# Patient Record
Sex: FEMALE | Race: WHITE | NOT HISPANIC OR LATINO | Employment: OTHER | URBAN - METROPOLITAN AREA
[De-identification: names, ages, dates, MRNs, and addresses within clinical notes are randomized per-mention and may not be internally consistent; named-entity substitution may affect disease eponyms.]

---

## 2020-01-01 ENCOUNTER — HOSPITAL ENCOUNTER (INPATIENT)
Facility: HOSPITAL | Age: 85
LOS: 7 days | Discharge: NURSING, REHAB OR LONG-TERM CARE | DRG: 956 | End: 2020-01-08
Attending: EMERGENCY MEDICINE | Admitting: HOSPITALIST
Payer: MEDICARE

## 2020-01-01 ENCOUNTER — ANESTHESIA EVENT (OUTPATIENT)
Dept: SURGERY | Facility: HOSPITAL | Age: 85
DRG: 956 | End: 2020-01-01
Payer: MEDICARE

## 2020-01-01 DIAGNOSIS — S42.212A CLOSED DISPLACED FRACTURE OF SURGICAL NECK OF LEFT HUMERUS, UNSPECIFIED FRACTURE MORPHOLOGY, INITIAL ENCOUNTER: ICD-10-CM

## 2020-01-01 DIAGNOSIS — T14.8XXA CONTUSION: ICD-10-CM

## 2020-01-01 DIAGNOSIS — T79.6XXA TRAUMATIC RHABDOMYOLYSIS, INITIAL ENCOUNTER: ICD-10-CM

## 2020-01-01 DIAGNOSIS — S72.142A CLOSED DISPLACED INTERTROCHANTERIC FRACTURE OF LEFT FEMUR, INITIAL ENCOUNTER: Primary | ICD-10-CM
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
## 2020-01-01 DIAGNOSIS — R07.9 CHEST PAIN: ICD-10-CM

## 2020-01-01 PROBLEM — S42.202A FRACTURE OF PROXIMAL END OF LEFT HUMERUS: Status: ACTIVE | Noted: 2020-01-01

## 2020-01-01 PROBLEM — D72.829 LEUKOCYTOSIS: Status: ACTIVE | Noted: 2020-01-01

## 2020-01-01 LAB
ABO + RH BLD: NORMAL
ALBUMIN SERPL BCP-MCNC: 4.1 G/DL (ref 3.5–5.2)
ALP SERPL-CCNC: 65 U/L (ref 55–135)
ALT SERPL W/O P-5'-P-CCNC: 26 U/L (ref 10–44)
ANION GAP SERPL CALC-SCNC: 11 MMOL/L (ref 8–16)
AST SERPL-CCNC: 48 U/L (ref 10–40)
BACTERIA #/AREA URNS AUTO: NORMAL /HPF
BASOPHILS # BLD AUTO: 0.03 K/UL (ref 0–0.2)
BASOPHILS NFR BLD: 0.1 % (ref 0–1.9)
BILIRUB SERPL-MCNC: 1.6 MG/DL (ref 0.1–1)
BILIRUB UR QL STRIP: NEGATIVE
BLD GP AB SCN CELLS X3 SERPL QL: NORMAL
BUN SERPL-MCNC: 26 MG/DL (ref 10–30)
CALCIUM SERPL-MCNC: 9.9 MG/DL (ref 8.7–10.5)
CHLORIDE SERPL-SCNC: 105 MMOL/L (ref 95–110)
CK SERPL-CCNC: 1721 U/L (ref 20–180)
CLARITY UR REFRACT.AUTO: CLEAR
CO2 SERPL-SCNC: 24 MMOL/L (ref 23–29)
COLOR UR AUTO: YELLOW
CREAT SERPL-MCNC: 0.9 MG/DL (ref 0.5–1.4)
DIFFERENTIAL METHOD: ABNORMAL
EOSINOPHIL # BLD AUTO: 0 K/UL (ref 0–0.5)
EOSINOPHIL NFR BLD: 0 % (ref 0–8)
ERYTHROCYTE [DISTWIDTH] IN BLOOD BY AUTOMATED COUNT: 15.1 % (ref 11.5–14.5)
EST. GFR  (AFRICAN AMERICAN): >60 ML/MIN/1.73 M^2
EST. GFR  (NON AFRICAN AMERICAN): 54.5 ML/MIN/1.73 M^2
GLUCOSE SERPL-MCNC: 138 MG/DL (ref 70–110)
GLUCOSE UR QL STRIP: NEGATIVE
HCT VFR BLD AUTO: 46 % (ref 37–48.5)
HGB BLD-MCNC: 14.5 G/DL (ref 12–16)
HGB UR QL STRIP: NEGATIVE
IMM GRANULOCYTES # BLD AUTO: 0.18 K/UL (ref 0–0.04)
IMM GRANULOCYTES NFR BLD AUTO: 0.7 % (ref 0–0.5)
INR PPP: 1.1 (ref 0.8–1.2)
KETONES UR QL STRIP: ABNORMAL
LACTATE SERPL-SCNC: 2 MMOL/L (ref 0.5–2.2)
LEUKOCYTE ESTERASE UR QL STRIP: NEGATIVE
LYMPHOCYTES # BLD AUTO: 0.9 K/UL (ref 1–4.8)
LYMPHOCYTES NFR BLD: 3.8 % (ref 18–48)
MCH RBC QN AUTO: 27.6 PG (ref 27–31)
MCHC RBC AUTO-ENTMCNC: 31.5 G/DL (ref 32–36)
MCV RBC AUTO: 88 FL (ref 82–98)
MICROSCOPIC COMMENT: NORMAL
MONOCYTES # BLD AUTO: 1.8 K/UL (ref 0.3–1)
MONOCYTES NFR BLD: 7.5 % (ref 4–15)
NEUTROPHILS # BLD AUTO: 21.6 K/UL (ref 1.8–7.7)
NEUTROPHILS NFR BLD: 87.9 % (ref 38–73)
NITRITE UR QL STRIP: NEGATIVE
NRBC BLD-RTO: 0 /100 WBC
PH UR STRIP: 5 [PH] (ref 5–8)
PLATELET # BLD AUTO: 258 K/UL (ref 150–350)
PMV BLD AUTO: 9.6 FL (ref 9.2–12.9)
POTASSIUM SERPL-SCNC: 4.5 MMOL/L (ref 3.5–5.1)
PROT SERPL-MCNC: 7.4 G/DL (ref 6–8.4)
PROT UR QL STRIP: NEGATIVE
PROTHROMBIN TIME: 10.9 SEC (ref 9–12.5)
RBC # BLD AUTO: 5.26 M/UL (ref 4–5.4)
RBC #/AREA URNS AUTO: 1 /HPF (ref 0–4)
SODIUM SERPL-SCNC: 140 MMOL/L (ref 136–145)
SP GR UR STRIP: 1.02 (ref 1–1.03)
TROPONIN I SERPL DL<=0.01 NG/ML-MCNC: 0.02 NG/ML (ref 0–0.03)
URN SPEC COLLECT METH UR: ABNORMAL
WBC # BLD AUTO: 24.52 K/UL (ref 3.9–12.7)
WBC #/AREA URNS AUTO: 1 /HPF (ref 0–5)

## 2020-01-01 PROCEDURE — 93010 EKG 12-LEAD: ICD-10-PCS | Mod: ,,, | Performed by: INTERNAL MEDICINE

## 2020-01-01 PROCEDURE — 84484 ASSAY OF TROPONIN QUANT: CPT

## 2020-01-01 PROCEDURE — 99285 PR EMERGENCY DEPT VISIT,LEVEL V: ICD-10-PCS | Mod: ,,, | Performed by: EMERGENCY MEDICINE

## 2020-01-01 PROCEDURE — 86901 BLOOD TYPING SEROLOGIC RH(D): CPT

## 2020-01-01 PROCEDURE — 25000003 PHARM REV CODE 250: Performed by: HOSPITALIST

## 2020-01-01 PROCEDURE — 96361 HYDRATE IV INFUSION ADD-ON: CPT

## 2020-01-01 PROCEDURE — 11000001 HC ACUTE MED/SURG PRIVATE ROOM

## 2020-01-01 PROCEDURE — 82962 GLUCOSE BLOOD TEST: CPT

## 2020-01-01 PROCEDURE — 63600175 PHARM REV CODE 636 W HCPCS: Performed by: HOSPITALIST

## 2020-01-01 PROCEDURE — 96360 HYDRATION IV INFUSION INIT: CPT

## 2020-01-01 PROCEDURE — 63600175 PHARM REV CODE 636 W HCPCS: Performed by: EMERGENCY MEDICINE

## 2020-01-01 PROCEDURE — 83605 ASSAY OF LACTIC ACID: CPT

## 2020-01-01 PROCEDURE — 99285 EMERGENCY DEPT VISIT HI MDM: CPT | Mod: ,,, | Performed by: EMERGENCY MEDICINE

## 2020-01-01 PROCEDURE — 80053 COMPREHEN METABOLIC PANEL: CPT

## 2020-01-01 PROCEDURE — 93010 ELECTROCARDIOGRAM REPORT: CPT | Mod: ,,, | Performed by: INTERNAL MEDICINE

## 2020-01-01 PROCEDURE — 81001 URINALYSIS AUTO W/SCOPE: CPT

## 2020-01-01 PROCEDURE — 99223 PR INITIAL HOSPITAL CARE,LEVL III: ICD-10-PCS | Mod: ,,, | Performed by: HOSPITALIST

## 2020-01-01 PROCEDURE — 82550 ASSAY OF CK (CPK): CPT

## 2020-01-01 PROCEDURE — 85610 PROTHROMBIN TIME: CPT

## 2020-01-01 PROCEDURE — 99223 1ST HOSP IP/OBS HIGH 75: CPT | Mod: ,,, | Performed by: HOSPITALIST

## 2020-01-01 PROCEDURE — 99285 EMERGENCY DEPT VISIT HI MDM: CPT | Mod: 25

## 2020-01-01 PROCEDURE — 85025 COMPLETE CBC W/AUTO DIFF WBC: CPT

## 2020-01-01 PROCEDURE — 93005 ELECTROCARDIOGRAM TRACING: CPT

## 2020-01-01 RX ORDER — ACETAMINOPHEN 500 MG
1000 TABLET ORAL EVERY 8 HOURS
Status: DISCONTINUED | OUTPATIENT
Start: 2020-01-01 | End: 2020-01-02

## 2020-01-01 RX ORDER — TALC
6 POWDER (GRAM) TOPICAL NIGHTLY PRN
Status: DISCONTINUED | OUTPATIENT
Start: 2020-01-01 | End: 2020-01-08 | Stop reason: HOSPADM

## 2020-01-01 RX ORDER — OXYCODONE HYDROCHLORIDE 5 MG/1
10 TABLET ORAL
Status: DISCONTINUED | OUTPATIENT
Start: 2020-01-01 | End: 2020-01-02

## 2020-01-01 RX ORDER — SODIUM CHLORIDE 0.9 % (FLUSH) 0.9 %
10 SYRINGE (ML) INJECTION
Status: DISCONTINUED | OUTPATIENT
Start: 2020-01-01 | End: 2020-01-08 | Stop reason: HOSPADM

## 2020-01-01 RX ORDER — MORPHINE SULFATE 2 MG/ML
2 INJECTION, SOLUTION INTRAMUSCULAR; INTRAVENOUS
Status: DISCONTINUED | OUTPATIENT
Start: 2020-01-01 | End: 2020-01-02

## 2020-01-01 RX ORDER — POLYETHYLENE GLYCOL 3350 17 G/17G
17 POWDER, FOR SOLUTION ORAL DAILY
Status: DISCONTINUED | OUTPATIENT
Start: 2020-01-02 | End: 2020-01-08 | Stop reason: HOSPADM

## 2020-01-01 RX ORDER — SODIUM CHLORIDE 9 MG/ML
INJECTION, SOLUTION INTRAVENOUS CONTINUOUS
Status: ACTIVE | OUTPATIENT
Start: 2020-01-01 | End: 2020-01-02

## 2020-01-01 RX ORDER — BISACODYL 10 MG
10 SUPPOSITORY, RECTAL RECTAL DAILY PRN
Status: DISCONTINUED | OUTPATIENT
Start: 2020-01-01 | End: 2020-01-08 | Stop reason: HOSPADM

## 2020-01-01 RX ORDER — ONDANSETRON 2 MG/ML
4 INJECTION INTRAMUSCULAR; INTRAVENOUS EVERY 12 HOURS PRN
Status: DISCONTINUED | OUTPATIENT
Start: 2020-01-01 | End: 2020-01-02

## 2020-01-01 RX ORDER — OXYCODONE HYDROCHLORIDE 5 MG/1
5 TABLET ORAL
Status: DISCONTINUED | OUTPATIENT
Start: 2020-01-01 | End: 2020-01-02

## 2020-01-01 RX ORDER — SODIUM CHLORIDE 9 MG/ML
1000 INJECTION, SOLUTION INTRAVENOUS
Status: COMPLETED | OUTPATIENT
Start: 2020-01-01 | End: 2020-01-01

## 2020-01-01 RX ORDER — METHOCARBAMOL 500 MG/1
1000 TABLET, FILM COATED ORAL EVERY 6 HOURS PRN
Status: DISCONTINUED | OUTPATIENT
Start: 2020-01-01 | End: 2020-01-02

## 2020-01-01 RX ADMIN — PREGABALIN 75 MG: 75 CAPSULE ORAL at 09:01

## 2020-01-01 RX ADMIN — MORPHINE SULFATE 2 MG: 2 INJECTION, SOLUTION INTRAMUSCULAR; INTRAVENOUS at 09:01

## 2020-01-01 RX ADMIN — SODIUM CHLORIDE 500 ML: 0.9 INJECTION, SOLUTION INTRAVENOUS at 04:01

## 2020-01-01 RX ADMIN — SODIUM CHLORIDE: 0.9 INJECTION, SOLUTION INTRAVENOUS at 09:01

## 2020-01-01 RX ADMIN — SODIUM CHLORIDE 1000 ML: 0.9 INJECTION, SOLUTION INTRAVENOUS at 06:01

## 2020-01-01 RX ADMIN — ACETAMINOPHEN 1000 MG: 500 TABLET ORAL at 09:01

## 2020-01-01 RX ADMIN — SODIUM CHLORIDE 500 ML: 0.9 INJECTION, SOLUTION INTRAVENOUS at 05:01

## 2020-01-01 NOTE — ED NOTES
Patient identifiers for Franny Arnold 89 y.o. female checked and correct.  Chief Complaint   Patient presents with    Fall     pt was last seen yesterday, lives at home alone, unwitnessed fall, left hip pain and deformity.      History reviewed. No pertinent past medical history.  Allergies reported: Review of patient's allergies indicates:  No Known Allergies      LOC: Patient is awake, alert, and aware of environment with an appropriate affect. Patient is oriented x 2,confused to date, and speaking appropriately.  APPEARANCE: Patient resting comfortably and in no acute distress. Patient is clean and well groomed, patient's clothing is properly fastened.  SKIN: The skin is warm and dry. Patient has normal skin turgor and dry mucus membranes. Bruising noted to left upper arm  MUSKULOSKELETAL: Patient has limited ROM to left arm, left upper arm swelling, left lower extremity with shortening and rotation noted, Pulses intact.   RESPIRATORY: Airway is open and patent. Respirations are spontaneous and non-labored with normal effort and rate, BBS=clear  CARDIAC: Patient has a normal rate and rhythm. SR on cardiac monitor,No peripheral edema noted.   ABDOMEN: No distention noted. Soft and non-tender upon palpation.  NEUROLOGICAL: Facial expression is symmetrical. Hand grasps are equal bilaterally. Normal sensation in all extremities when touched with finger.

## 2020-01-01 NOTE — ED PROVIDER NOTES
Encounter Date: 1/1/2020       History     Chief Complaint   Patient presents with    Fall     pt was last seen yesterday, lives at home alone, unwitnessed fall, left hip pain and deformity.      HPI     The patient is a 89-year-old female with a history of early dementia who currently lives at home alone presenting with an unwitnessed fall with left hip pain and left arm pain.  Patient arrives via EMS, with left hip pain and concern for deformity.  This was an unwitnessed fall however the patient's family member arrived stating that she was found on the floor with unknown period of down time.  Patient denies any loss of consciousness but does endorse left hip and left shoulder/arm pain. She denies any fevers, chills, nausea, vomiting, diarrhea, chest pain, back pain, neck pain, loss of consciousness, headaches or visual changes.  Patient does not have any significant medical history, and is not on current anticoagulation.  Current pain scale 3/10, increased to 7/10 with movement of her left hip and left shoulder.  No other aggravating or alleviating factors.  Patient is right-handed, and was previously ambulatory prior to fall.    Review of patient's allergies indicates:  No Known Allergies  Past Medical History:   Diagnosis Date    Dementia      Past Surgical History:   Procedure Laterality Date    APPENDECTOMY      INSERTION OF INTRAMEDULLARY ZAYNAB Left 1/2/2020    Procedure: INSERTION, INTRAMEDULLARY ZAYNAB;  Surgeon: Adithya Addison MD;  [facility] [facility] [address] [place] [address];  Service: Orthopedics;  Laterality: Left;    TONSILLECTOMY       History reviewed. No pertinent family history.  Social History     Tobacco Use    Smoking status: Never Smoker    Smokeless tobacco: Never Used   Substance Use Topics    Alcohol use: Not Currently    Drug use: Never     Review of Systems   Constitutional: Negative for chills, fatigue and fever.   HENT: Negative for congestion and sore throat.    Eyes: Negative for  photophobia and visual disturbance.   Respiratory: Negative for choking, chest tightness and shortness of breath.    Cardiovascular: Negative for chest pain and palpitations.   Gastrointestinal: Negative for abdominal distention, abdominal pain, nausea and vomiting.   Endocrine: Negative for polyphagia and polyuria.   Genitourinary: Negative for difficulty urinating, dysuria and pelvic pain.   Musculoskeletal: Positive for arthralgias, gait problem, joint swelling and myalgias. Negative for neck pain and neck stiffness.   Neurological: Negative for seizures, speech difficulty, weakness, numbness and headaches.   Psychiatric/Behavioral: Positive for confusion.       Physical Exam     Initial Vitals   BP Pulse Resp Temp SpO2   01/01/20 1430 01/01/20 1430 01/01/20 1526 01/01/20 1430 01/01/20 1430   134/82 102 20 98.3 °F (36.8 °C) 96 %      MAP       --                Physical Exam    Nursing note and vitals reviewed.    Gen/Constitutional: Interactive. No acute distress  Head: Normocephalic, Atraumatic  Neck: supple, no masses or LAD, no JVD  Eyes: PERRLA, conjunctiva clear  Ears, Nose and Throat: No rhinorrhea or stridor.  Cardiac: Reg Rhythm, No murmur  Pulmonary: CTA Bilat, no wheezes, rhonchi, rales.  GI: Abdomen soft, non-tender, non-distended; no rebound or guarding  : No CVA tenderness.  Musculoskeletal: Extremities warm, well perfused, no erythema, no edema  Spine:  No midline spinal tenderness along the lumbar, thoracic or cervical spine, no bony step-offs.  Left hip:  Shortened, externally rotated, 2+ distal pulses, sensory intact to light touch, pain with elevation, extension or flexion, neurovascular intact.  Left shoulder/upper arm:  Ecchymosis and bruising overlying the left upper arm in the region of the humerus, tender to palpation, tender at humeral head, 2+ distal pulses, sensory intact to light touch, pain with range of motion, extension and elevation.  Skin: No rashes, ecchymosis and bruising as  above.  Neuro: Alert and Oriented x 2 with evidence of dementia and confusion.; No focal motor or sensory deficits.  Gait not assessed, no dysmetria, no ataxia, cranial nerves 2-12 intact.  Psych: Normal affect, pleasantly confused      ED Course   Procedures  Labs Reviewed   CBC W/ AUTO DIFFERENTIAL - Abnormal; Notable for the following components:       Result Value    WBC 24.52 (*)     Mean Corpuscular Hemoglobin Conc 31.5 (*)     RDW 15.1 (*)     Immature Granulocytes 0.7 (*)     Gran # (ANC) 21.6 (*)     Immature Grans (Abs) 0.18 (*)     Lymph # 0.9 (*)     Mono # 1.8 (*)     Gran% 87.9 (*)     Lymph% 3.8 (*)     All other components within normal limits   COMPREHENSIVE METABOLIC PANEL - Abnormal; Notable for the following components:    Glucose 138 (*)     Total Bilirubin 1.6 (*)     AST 48 (*)     eGFR if non  54.5 (*)     All other components within normal limits   URINALYSIS, REFLEX TO URINE CULTURE - Abnormal; Notable for the following components:    Ketones, UA 1+ (*)     All other components within normal limits    Narrative:     Preferred Collection Type->Urine, Clean Catch   CK - Abnormal; Notable for the following components:    CPK 1721 (*)     All other components within normal limits   TROPONIN I   PROTIME-INR   LACTIC ACID, PLASMA   URINALYSIS MICROSCOPIC    Narrative:     Preferred Collection Type->Urine, Clean Catch   POCT GLUCOSE MONITORING CONTINUOUS     EKG Readings: (Independently Interpreted)   Initial Reading: No STEMI. Previous EKG: Compared with most recent EKG Rhythm: Normal Sinus Rhythm. Heart Rate: 98. Ectopy: No Ectopy. ST Segments: Non-Specific ST Segment Depression.     ECG Results          EKG 12-lead (Final result)  Result time 01/02/20 11:31:04    Final result by Interface, [facility] (01/02/20 11:31:04)                 Narrative:    Test Reason : W19.XXXA,    Vent. Rate : 098 BPM     Atrial Rate : 098 BPM     P-R Int : 144 ms          QRS Dur : 076 ms       QT Int : 380 ms       P-R-T Axes : 085 070 073 degrees     QTc Int : 485 ms    Normal sinus rhythm  Possible Left atrial enlargement  Otherwise normal ECG  No previous ECGs available  Confirmed by fellow Tamara CARTER (Fellow's Unofficial Report), Carlitos ([id number]) on  1/2/2020 9:56:20 AM  Confirmed by Maciej Powell MD ([id number]) on 1/2/2020 11:30:53 AM    Referred By: [email or web address]   SELF           Confirmed By:Maciej Powell MD                            Imaging Results          CT Head Without Contrast (Final result)  Result time 01/01/20 18:05:47    Final result by Eduardo Dailey MD (01/01/20 18:05:47)                 Impression:      1. No acute intracranial abnormality identified.  2. Generalized cerebral volume loss and sequela of chronic microvascular ischemic change.    Electronically signed by resident: Maciej Vora  Date:    01/01/2020  Time:    17:56    Electronically signed by: Eduardo Dailey MD  Date:    01/01/2020  Time:    18:05             Narrative:    EXAMINATION:  CT HEAD WITHOUT CONTRAST    CLINICAL HISTORY:  Confusion/delirium, altered LOC, unexplained;    TECHNIQUE:  Low dose axial CT images obtained throughout the head without intravenous contrast. Sagittal and coronal reconstructions were performed.    COMPARISON:  None.    FINDINGS:  Intracranial compartment:    There is generalized cerebral volume loss.  There is hypoattenuation in a periventricular fashion, likely sequela of chronic microvascular ischemic change.No hydrocephalus.  No extra-axial blood or fluid collections.    No parenchymal mass, hemorrhage, edema or major vascular distribution infarct.  No midline shift.    Skull/extracranial contents (limited evaluation): No fracture. Mastoid air cells and paranasal sinuses are essentially clear.                               X-Ray Humerus 2 View Left (Final result)  Result time 01/01/20 16:48:00    Final result by Eduardo Dailey MD (01/01/20 16:48:00)                 Impression:      1.  Acute proximal left humeral head/neck fracture as above.      Electronically signed by: Eduardo Dailey MD  Date:    01/01/2020  Time:    16:48             Narrative:    EXAMINATION:  XR HUMERUS 2 VIEW LEFT    CLINICAL HISTORY:  Other injury of unspecified body region, initial encounter    COMPARISON:  None    FINDINGS:  Two views left humerus.    There is an acute fracture of the surgical neck of the proximal humerus, fracture planes extend to involve the humeral head, better demonstrated on current exam than on concurrent radiograph of the shoulder.  The remaining aspects of the humerus appear intact.  The elbow appears grossly intact.                               X-Ray Shoulder Trauma Left (Final result)  Result time 01/01/20 16:47:00    Final result by Eduardo Dailey MD (01/01/20 16:47:00)                 Impression:      1. Findings concerning for impacted fracture of the left humeral surgical neck as above.      Electronically signed by: Eduardo Dailey MD  Date:    01/01/2020  Time:    16:47             Narrative:    EXAMINATION:  XR SHOULDER TRAUMA 3 VIEW LEFT    CLINICAL HISTORY:  Unspecified fall, initial encounter    TECHNIQUE:  Three views of the left shoulder were performed.    COMPARISON  None    FINDINGS:  Three views.    There is cortical irregularity involving the surgical neck of the humerus.  There may be impaction at the site.  No glenohumeral dislocation.  The left acromioclavicular joint is intact.  No acute displaced left rib fracture.                               X-Ray Pelvis Routine AP (Final result)  Result time 01/01/20 16:47:20   Procedure changed from X-Ray Hip 2 View Left     Final result by Jama Piper MD (01/01/20 16:47:20)                 Impression:      Acute intertrochanteric fracture of the proximal left femur.      Electronically signed by: Jama Piper MD  Date:    01/01/2020  Time:    16:47             Narrative:    EXAMINATION:  XR PELVIS ROUTINE  AP    CLINICAL HISTORY:  fall ;  Unspecified fall, initial encounter    TECHNIQUE:  AP view of the pelvis was performed.    COMPARISON:  None.    FINDINGS:  There is an acute left intertrochanteric fracture demonstrated.  There is resulting varus angulation.  The left femoral head remains appropriately seated within the acetabulum.  The remaining osseous structures appear intact.  Sacrum is obscured by overlying bowel gas.                               X-Ray Femur AP/LAT Left (Final result)  Result time 01/01/20 16:45:54    Final result by Eduardo Dailey MD (01/01/20 16:45:54)                 Impression:      1. Acute intertrochanteric fracture of the proximal left femur as above.      Electronically signed by: Eduardo Dailey MD  Date:    01/01/2020  Time:    16:45             Narrative:    EXAMINATION:  XR FEMUR 2 VIEW LEFT    TECHNIQUE:  AP and lateral views of the left femur were performed.    COMPARISON:  None}    FINDINGS:  Four views left femur.    There is an acute intertrochanteric fracture of the proximal left femur.  The femoroacetabular joint is intact.  There is osteopenia.  No additional fracture of the femur.  The knee appears intact.  No radiopaque foreign body.                               X-Ray Chest AP Portable (Final result)  Result time 01/01/20 16:45:37    Final result by Venancio Obrien MD (01/01/20 16:45:37)                 Impression:      No acute abnormality.      Electronically signed by: Venancio Obrien MD  Date:    01/01/2020  Time:    16:45             Narrative:    EXAMINATION:  XR CHEST AP PORTABLE    CLINICAL HISTORY:  fall;    TECHNIQUE:  Single frontal view of the chest was performed.    COMPARISON:  None    FINDINGS:  Lungs are clear.  No effusion or pneumothorax.    No acute bone abnormality.                              X-Rays:   Independently Interpreted Readings:   Chest X-Ray: Normal heart size.  No infiltrates.  No acute abnormalities.   Head CT: No hemorrhage.  No  skull fracture.  No acute stroke.   Other Readings:  X-ray humerus:  Acute proximal left humeral head/neck fracture    X-ray femur/hip:  Acute intratrochanteric fracture of the left femur    Medical Decision Making:   History:   I obtained history from: EMS provider.  Old Medical Records: I decided to obtain old medical records.  Old Records Summarized: records from clinic visits.  Initial Assessment:   89-year-old previously healthy female with a history of early dementia presenting with unwitnessed fall, left hip deformity, and left upper arm ecchymosis.  Differential Diagnosis:   Differential diagnosis includes but is not limited to:  Fracture, dislocation, contusion, sprain, ACS, PE, dehydration, electrolyte abnormality, UTI, pneumonia, uremia, rhabdomyolysis  Independently Interpreted Test(s):   I have ordered and independently interpreted X-rays - see prior notes.  I have ordered and independently interpreted EKG Reading(s) - see prior notes  Clinical Tests:   Lab Tests: Ordered and Reviewed  Radiological Study: Reviewed and Ordered  Medical Tests: Ordered and Reviewed  Other:   I have discussed this case with another health care provider.       <> Summary of the Discussion: Orthopedic surgery and hospital medicine    Emergent evaluation of patient presenting with unwitnessed mechanical fall while getting out of bed.  She is afebrile, vital signs are stable. Physical exam findings remarkable for left hip deformity with external rotation and shortening including ecchymosis along the left upper arm with pain with range of motion and direct palpation. Suspect likely hip fracture an arm fracture.  Imaging of the chest, left upper arm and shoulder, left hip and pelvis were obtained. Positive for fracture of the left femur and positive for fracture of the left humerus.  Discussed case with Orthopedic surgery, who will evaluate for operative intervention.  Regarding the left arm, plan is for sling and swath.   Remainder of workup included ECG, chest x-ray, labs, IV fluids.  Given unwitnessed fall broad workup conducted.  Doubt syncope as patient states this was a mechanical fall as she tripped and lost balance.  Although she was found down for possibly 1-2 hours, unlikely ACS, PE based on exam, history and findings.  Patient is alert and oriented however she has early dementia and appears to have some confusion at times.  UA obtained, with no evidence of UTI.  No infectious etiology at this time however patient does have significant leukocytosis to 24,000.  Suspect this is likely stress response from fractures and fall.  CPK elevated, likely rhabdomyolysis and will continue to monitor.  Patient was given fluid L bolus, and then placed on IV fluids at 100 mL/hour given new rhabdomyolysis.  No evidence of renal failure at this time.  ECG with no signs of ischemia or STEMI on my read.  Chest x-ray with no acute findings to suggest pneumonia, consolidation or pneumothorax.  Given orthopedic injuries, discussed case with hospital medicine team, will admit for hip fracture management, and left humerus fracture.  I had a long discussion with the family, they are agreeable to admission plan, patient will likely need outpatient rehab/skilled nursing care after discharge and operative fixation of her left hip.  CT head was obtained with no evidence of intracranial process, intracranial hemorrhage or acute findings.  Spine exam unremarkable with no tenderness along the midline spine or bony step-offs.    Complexity:  High-level 5                               Clinical Impression:       ICD-10-CM ICD-9-CM   1. Closed displaced intertrochanteric fracture of left femur, initial encounter S72.142A 820.21   2. Fall W19.XXXA E888.9   3. Contusion T14.8XXA 924.9   4. Closed displaced fracture of surgical neck of left humerus, unspecified fracture morphology, initial encounter S42.212A 812.01   5. Traumatic rhabdomyolysis, initial encounter  T79.6XXA 958.6         Disposition:   Disposition: Admitted  Condition: Stable      Alejandro Maloney DO  Dept of Emergency Medicine   [facility]  Spectralink: [id number]                 Alejandro Maloney DO  01/03/20 1130

## 2020-01-01 NOTE — ED TRIAGE NOTES
Fall today found on floor by family, pt denies LOC, obvious shortening and external rotation to left leg.    no

## 2020-01-02 ENCOUNTER — ANESTHESIA (OUTPATIENT)
Dept: SURGERY | Facility: HOSPITAL | Age: 85
DRG: 956 | End: 2020-01-02
Payer: MEDICARE

## 2020-01-02 LAB
ANION GAP SERPL CALC-SCNC: 8 MMOL/L (ref 8–16)
BASOPHILS # BLD AUTO: 0.02 K/UL (ref 0–0.2)
BASOPHILS # BLD AUTO: 0.05 K/UL (ref 0–0.2)
BASOPHILS NFR BLD: 0.1 % (ref 0–1.9)
BASOPHILS NFR BLD: 0.2 % (ref 0–1.9)
BUN SERPL-MCNC: 19 MG/DL (ref 10–30)
CALCIUM SERPL-MCNC: 8.5 MG/DL (ref 8.7–10.5)
CHLORIDE SERPL-SCNC: 112 MMOL/L (ref 95–110)
CK SERPL-CCNC: 853 U/L (ref 20–180)
CO2 SERPL-SCNC: 22 MMOL/L (ref 23–29)
CREAT SERPL-MCNC: 0.8 MG/DL (ref 0.5–1.4)
DIFFERENTIAL METHOD: ABNORMAL
DIFFERENTIAL METHOD: ABNORMAL
EOSINOPHIL # BLD AUTO: 0 K/UL (ref 0–0.5)
EOSINOPHIL # BLD AUTO: 0 K/UL (ref 0–0.5)
EOSINOPHIL NFR BLD: 0 % (ref 0–8)
EOSINOPHIL NFR BLD: 0.1 % (ref 0–8)
ERYTHROCYTE [DISTWIDTH] IN BLOOD BY AUTOMATED COUNT: 15.7 % (ref 11.5–14.5)
ERYTHROCYTE [DISTWIDTH] IN BLOOD BY AUTOMATED COUNT: 15.9 % (ref 11.5–14.5)
EST. GFR  (AFRICAN AMERICAN): >60 ML/MIN/1.73 M^2
EST. GFR  (NON AFRICAN AMERICAN): >60 ML/MIN/1.73 M^2
GLUCOSE SERPL-MCNC: 97 MG/DL (ref 70–110)
HCT VFR BLD AUTO: 37.7 % (ref 37–48.5)
HCT VFR BLD AUTO: 40.2 % (ref 37–48.5)
HGB BLD-MCNC: 12 G/DL (ref 12–16)
HGB BLD-MCNC: 12.1 G/DL (ref 12–16)
IMM GRANULOCYTES # BLD AUTO: 0.09 K/UL (ref 0–0.04)
IMM GRANULOCYTES # BLD AUTO: 0.25 K/UL (ref 0–0.04)
IMM GRANULOCYTES NFR BLD AUTO: 0.5 % (ref 0–0.5)
IMM GRANULOCYTES NFR BLD AUTO: 1.1 % (ref 0–0.5)
LYMPHOCYTES # BLD AUTO: 1.7 K/UL (ref 1–4.8)
LYMPHOCYTES # BLD AUTO: 2.3 K/UL (ref 1–4.8)
LYMPHOCYTES NFR BLD: 13.1 % (ref 18–48)
LYMPHOCYTES NFR BLD: 7.3 % (ref 18–48)
MAGNESIUM SERPL-MCNC: 2 MG/DL (ref 1.6–2.6)
MCH RBC QN AUTO: 27.7 PG (ref 27–31)
MCH RBC QN AUTO: 28.1 PG (ref 27–31)
MCHC RBC AUTO-ENTMCNC: 29.9 G/DL (ref 32–36)
MCHC RBC AUTO-ENTMCNC: 32.1 G/DL (ref 32–36)
MCV RBC AUTO: 88 FL (ref 82–98)
MCV RBC AUTO: 93 FL (ref 82–98)
MONOCYTES # BLD AUTO: 1.6 K/UL (ref 0.3–1)
MONOCYTES # BLD AUTO: 1.9 K/UL (ref 0.3–1)
MONOCYTES NFR BLD: 7.8 % (ref 4–15)
MONOCYTES NFR BLD: 9.2 % (ref 4–15)
NEUTROPHILS # BLD AUTO: 13.6 K/UL (ref 1.8–7.7)
NEUTROPHILS # BLD AUTO: 19.8 K/UL (ref 1.8–7.7)
NEUTROPHILS NFR BLD: 77 % (ref 38–73)
NEUTROPHILS NFR BLD: 83.6 % (ref 38–73)
NRBC BLD-RTO: 0 /100 WBC
NRBC BLD-RTO: 0 /100 WBC
PHOSPHATE SERPL-MCNC: 2.9 MG/DL (ref 2.7–4.5)
PLATELET # BLD AUTO: 217 K/UL (ref 150–350)
PLATELET # BLD AUTO: 218 K/UL (ref 150–350)
PMV BLD AUTO: 9.8 FL (ref 9.2–12.9)
PMV BLD AUTO: 9.8 FL (ref 9.2–12.9)
POCT GLUCOSE: 127 MG/DL (ref 70–110)
POTASSIUM SERPL-SCNC: 3.9 MMOL/L (ref 3.5–5.1)
RBC # BLD AUTO: 4.3 M/UL (ref 4–5.4)
RBC # BLD AUTO: 4.33 M/UL (ref 4–5.4)
SODIUM SERPL-SCNC: 142 MMOL/L (ref 136–145)
WBC # BLD AUTO: 17.59 K/UL (ref 3.9–12.7)
WBC # BLD AUTO: 23.75 K/UL (ref 3.9–12.7)

## 2020-01-02 PROCEDURE — 85025 COMPLETE CBC W/AUTO DIFF WBC: CPT

## 2020-01-02 PROCEDURE — 63600175 PHARM REV CODE 636 W HCPCS: Performed by: HOSPITALIST

## 2020-01-02 PROCEDURE — 63600175 PHARM REV CODE 636 W HCPCS: Performed by: ANESTHESIOLOGY

## 2020-01-02 PROCEDURE — 23600 PR CLOSED RX PROX HUMERUS FRACTURE: ICD-10-PCS | Mod: 51,LT,, | Performed by: ORTHOPAEDIC SURGERY

## 2020-01-02 PROCEDURE — 63600175 PHARM REV CODE 636 W HCPCS: Performed by: STUDENT IN AN ORGANIZED HEALTH CARE EDUCATION/TRAINING PROGRAM

## 2020-01-02 PROCEDURE — 36000711: Performed by: ORTHOPAEDIC SURGERY

## 2020-01-02 PROCEDURE — 25000003 PHARM REV CODE 250: Performed by: STUDENT IN AN ORGANIZED HEALTH CARE EDUCATION/TRAINING PROGRAM

## 2020-01-02 PROCEDURE — D9220A PRA ANESTHESIA: Mod: ANES,,, | Performed by: ANESTHESIOLOGY

## 2020-01-02 PROCEDURE — 37000008 HC ANESTHESIA 1ST 15 MINUTES: Performed by: ORTHOPAEDIC SURGERY

## 2020-01-02 PROCEDURE — D9220A PRA ANESTHESIA: ICD-10-PCS | Mod: ANES,,, | Performed by: ANESTHESIOLOGY

## 2020-01-02 PROCEDURE — D9220A PRA ANESTHESIA: Mod: CRNA,,, | Performed by: NURSE ANESTHETIST, CERTIFIED REGISTERED

## 2020-01-02 PROCEDURE — 76942 ECHO GUIDE FOR BIOPSY: CPT | Performed by: STUDENT IN AN ORGANIZED HEALTH CARE EDUCATION/TRAINING PROGRAM

## 2020-01-02 PROCEDURE — 99222 PR INITIAL HOSPITAL CARE,LEVL II: ICD-10-PCS | Mod: 57,,, | Performed by: ORTHOPAEDIC SURGERY

## 2020-01-02 PROCEDURE — 37000009 HC ANESTHESIA EA ADD 15 MINS: Performed by: ORTHOPAEDIC SURGERY

## 2020-01-02 PROCEDURE — 25000003 PHARM REV CODE 250: Performed by: NURSE ANESTHETIST, CERTIFIED REGISTERED

## 2020-01-02 PROCEDURE — 99900035 HC TECH TIME PER 15 MIN (STAT)

## 2020-01-02 PROCEDURE — 25000003 PHARM REV CODE 250: Performed by: HOSPITALIST

## 2020-01-02 PROCEDURE — 27245 PR OPEN FIX INTER/SUBTROCH FX,IMPLNT: ICD-10-PCS | Mod: LT,,, | Performed by: ORTHOPAEDIC SURGERY

## 2020-01-02 PROCEDURE — 80048 BASIC METABOLIC PNL TOTAL CA: CPT

## 2020-01-02 PROCEDURE — D9220A PRA ANESTHESIA: ICD-10-PCS | Mod: CRNA,,, | Performed by: NURSE ANESTHETIST, CERTIFIED REGISTERED

## 2020-01-02 PROCEDURE — 23600 CLTX PROX HUMRL FX W/O MNPJ: CPT | Mod: 51,LT,, | Performed by: ORTHOPAEDIC SURGERY

## 2020-01-02 PROCEDURE — 83735 ASSAY OF MAGNESIUM: CPT

## 2020-01-02 PROCEDURE — 82550 ASSAY OF CK (CPK): CPT

## 2020-01-02 PROCEDURE — 76942 ECHO GUIDE FOR BIOPSY: CPT | Mod: 26,,, | Performed by: ANESTHESIOLOGY

## 2020-01-02 PROCEDURE — 64999 LEFT SIFI CATHETER: ICD-10-PCS | Mod: ,,, | Performed by: ANESTHESIOLOGY

## 2020-01-02 PROCEDURE — 64999 UNLISTED PX NERVOUS SYSTEM: CPT | Mod: ,,, | Performed by: ANESTHESIOLOGY

## 2020-01-02 PROCEDURE — 64448 NJX AA&/STRD FEM NRV NFS IMG: CPT | Performed by: STUDENT IN AN ORGANIZED HEALTH CARE EDUCATION/TRAINING PROGRAM

## 2020-01-02 PROCEDURE — 76942 LEFT SIFI CATHETER: ICD-10-PCS | Mod: 26,,, | Performed by: ANESTHESIOLOGY

## 2020-01-02 PROCEDURE — 11000001 HC ACUTE MED/SURG PRIVATE ROOM

## 2020-01-02 PROCEDURE — 84100 ASSAY OF PHOSPHORUS: CPT

## 2020-01-02 PROCEDURE — 36000710: Performed by: ORTHOPAEDIC SURGERY

## 2020-01-02 PROCEDURE — 27000221 HC OXYGEN, UP TO 24 HOURS

## 2020-01-02 PROCEDURE — C1769 GUIDE WIRE: HCPCS | Performed by: ORTHOPAEDIC SURGERY

## 2020-01-02 PROCEDURE — 63600175 PHARM REV CODE 636 W HCPCS: Performed by: NURSE ANESTHETIST, CERTIFIED REGISTERED

## 2020-01-02 PROCEDURE — 71000033 HC RECOVERY, INTIAL HOUR: Performed by: ORTHOPAEDIC SURGERY

## 2020-01-02 PROCEDURE — 27201423 OPTIME MED/SURG SUP & DEVICES STERILE SUPPLY: Performed by: ORTHOPAEDIC SURGERY

## 2020-01-02 PROCEDURE — 36415 COLL VENOUS BLD VENIPUNCTURE: CPT

## 2020-01-02 PROCEDURE — C1713 ANCHOR/SCREW BN/BN,TIS/BN: HCPCS | Performed by: ORTHOPAEDIC SURGERY

## 2020-01-02 PROCEDURE — 94761 N-INVAS EAR/PLS OXIMETRY MLT: CPT

## 2020-01-02 PROCEDURE — 27245 TREAT THIGH FRACTURE: CPT | Mod: LT,,, | Performed by: ORTHOPAEDIC SURGERY

## 2020-01-02 PROCEDURE — 99222 1ST HOSP IP/OBS MODERATE 55: CPT | Mod: 57,,, | Performed by: ORTHOPAEDIC SURGERY

## 2020-01-02 DEVICE — NAIL IM CANN 130 DEG 11X400 L: Type: IMPLANTABLE DEVICE | Site: FEMUR | Status: FUNCTIONAL

## 2020-01-02 DEVICE — SCREW STRDRV REC T25 5X50 TTNM: Type: IMPLANTABLE DEVICE | Site: FEMUR | Status: FUNCTIONAL

## 2020-01-02 RX ORDER — ONDANSETRON 2 MG/ML
INJECTION INTRAMUSCULAR; INTRAVENOUS
Status: DISCONTINUED | OUTPATIENT
Start: 2020-01-02 | End: 2020-01-02

## 2020-01-02 RX ORDER — PHENYLEPHRINE HYDROCHLORIDE 10 MG/ML
INJECTION INTRAVENOUS
Status: DISCONTINUED | OUTPATIENT
Start: 2020-01-02 | End: 2020-01-02

## 2020-01-02 RX ORDER — ROPIVACAINE HYDROCHLORIDE 2 MG/ML
2 INJECTION, SOLUTION EPIDURAL; INFILTRATION; PERINEURAL CONTINUOUS
Status: DISCONTINUED | OUTPATIENT
Start: 2020-01-02 | End: 2020-01-06

## 2020-01-02 RX ORDER — SODIUM CHLORIDE 0.9 % (FLUSH) 0.9 %
10 SYRINGE (ML) INJECTION
Status: DISCONTINUED | OUTPATIENT
Start: 2020-01-02 | End: 2020-01-08 | Stop reason: HOSPADM

## 2020-01-02 RX ORDER — CEFAZOLIN SODIUM 1 G/3ML
2 INJECTION, POWDER, FOR SOLUTION INTRAMUSCULAR; INTRAVENOUS
Status: COMPLETED | OUTPATIENT
Start: 2020-01-02 | End: 2020-01-03

## 2020-01-02 RX ORDER — MUPIROCIN 20 MG/G
1 OINTMENT TOPICAL 2 TIMES DAILY
Status: COMPLETED | OUTPATIENT
Start: 2020-01-02 | End: 2020-01-06

## 2020-01-02 RX ORDER — LIDOCAINE HYDROCHLORIDE 10 MG/ML
1 INJECTION, SOLUTION EPIDURAL; INFILTRATION; INTRACAUDAL; PERINEURAL
Status: DISCONTINUED | OUTPATIENT
Start: 2020-01-02 | End: 2020-01-02

## 2020-01-02 RX ORDER — PROPOFOL 10 MG/ML
VIAL (ML) INTRAVENOUS
Status: DISCONTINUED | OUTPATIENT
Start: 2020-01-02 | End: 2020-01-02

## 2020-01-02 RX ORDER — LIDOCAINE HCL/PF 100 MG/5ML
SYRINGE (ML) INTRAVENOUS
Status: DISCONTINUED | OUTPATIENT
Start: 2020-01-02 | End: 2020-01-02

## 2020-01-02 RX ORDER — TRANEXAMIC ACID 100 MG/ML
1000 INJECTION, SOLUTION INTRAVENOUS
Status: DISCONTINUED | OUTPATIENT
Start: 2020-01-02 | End: 2020-01-02

## 2020-01-02 RX ORDER — ROPIVACAINE HYDROCHLORIDE 5 MG/ML
INJECTION, SOLUTION EPIDURAL; INFILTRATION; PERINEURAL
Status: COMPLETED | OUTPATIENT
Start: 2020-01-02 | End: 2020-01-02

## 2020-01-02 RX ORDER — SODIUM CHLORIDE 9 MG/ML
INJECTION, SOLUTION INTRAVENOUS CONTINUOUS PRN
Status: DISCONTINUED | OUTPATIENT
Start: 2020-01-02 | End: 2020-01-02

## 2020-01-02 RX ORDER — ROPIVACAINE HYDROCHLORIDE 5 MG/ML
INJECTION, SOLUTION EPIDURAL; INFILTRATION; PERINEURAL
Status: COMPLETED
Start: 2020-01-02 | End: 2020-01-02

## 2020-01-02 RX ORDER — METHOCARBAMOL 500 MG/1
500 TABLET, FILM COATED ORAL 4 TIMES DAILY
Status: DISCONTINUED | OUTPATIENT
Start: 2020-01-02 | End: 2020-01-07

## 2020-01-02 RX ORDER — FENTANYL CITRATE 50 UG/ML
INJECTION, SOLUTION INTRAMUSCULAR; INTRAVENOUS
Status: DISCONTINUED | OUTPATIENT
Start: 2020-01-02 | End: 2020-01-02

## 2020-01-02 RX ORDER — ONDANSETRON 2 MG/ML
4 INJECTION INTRAMUSCULAR; INTRAVENOUS EVERY 12 HOURS PRN
Status: DISCONTINUED | OUTPATIENT
Start: 2020-01-02 | End: 2020-01-08 | Stop reason: HOSPADM

## 2020-01-02 RX ORDER — ENOXAPARIN SODIUM 100 MG/ML
40 INJECTION SUBCUTANEOUS EVERY 24 HOURS
Status: DISCONTINUED | OUTPATIENT
Start: 2020-01-02 | End: 2020-01-08 | Stop reason: HOSPADM

## 2020-01-02 RX ORDER — MUPIROCIN 20 MG/G
1 OINTMENT TOPICAL
Status: DISCONTINUED | OUTPATIENT
Start: 2020-01-02 | End: 2020-01-02

## 2020-01-02 RX ORDER — FENTANYL CITRATE 50 UG/ML
25 INJECTION, SOLUTION INTRAMUSCULAR; INTRAVENOUS EVERY 5 MIN PRN
Status: DISCONTINUED | OUTPATIENT
Start: 2020-01-02 | End: 2020-01-02

## 2020-01-02 RX ORDER — ROCURONIUM BROMIDE 10 MG/ML
INJECTION, SOLUTION INTRAVENOUS
Status: DISCONTINUED | OUTPATIENT
Start: 2020-01-02 | End: 2020-01-02

## 2020-01-02 RX ORDER — ACETAMINOPHEN 325 MG/1
650 TABLET ORAL EVERY 6 HOURS
Status: DISPENSED | OUTPATIENT
Start: 2020-01-02 | End: 2020-01-04

## 2020-01-02 RX ORDER — CEFAZOLIN SODIUM 1 G/3ML
2 INJECTION, POWDER, FOR SOLUTION INTRAMUSCULAR; INTRAVENOUS
Status: COMPLETED | OUTPATIENT
Start: 2020-01-02 | End: 2020-01-02

## 2020-01-02 RX ORDER — MUPIROCIN 20 MG/G
OINTMENT TOPICAL
Status: DISCONTINUED | OUTPATIENT
Start: 2020-01-02 | End: 2020-01-02 | Stop reason: HOSPADM

## 2020-01-02 RX ORDER — CHOLECALCIFEROL (VITAMIN D3) 25 MCG
2000 TABLET ORAL DAILY
Status: DISCONTINUED | OUTPATIENT
Start: 2020-01-02 | End: 2020-01-08 | Stop reason: HOSPADM

## 2020-01-02 RX ORDER — MIDAZOLAM HYDROCHLORIDE 1 MG/ML
1 INJECTION INTRAMUSCULAR; INTRAVENOUS EVERY 5 MIN PRN
Status: DISCONTINUED | OUTPATIENT
Start: 2020-01-02 | End: 2020-01-02

## 2020-01-02 RX ADMIN — ACETAMINOPHEN 650 MG: 325 TABLET ORAL at 06:01

## 2020-01-02 RX ADMIN — METHOCARBAMOL TABLETS 500 MG: 500 TABLET, COATED ORAL at 02:01

## 2020-01-02 RX ADMIN — FENTANYL CITRATE 50 MCG: 50 INJECTION INTRAMUSCULAR; INTRAVENOUS at 09:01

## 2020-01-02 RX ADMIN — FENTANYL CITRATE 25 MCG: 50 INJECTION, SOLUTION INTRAMUSCULAR; INTRAVENOUS at 11:01

## 2020-01-02 RX ADMIN — MUPIROCIN 1 G: 20 OINTMENT TOPICAL at 09:01

## 2020-01-02 RX ADMIN — ROPIVACAINE HYDROCHLORIDE 2 ML/HR: 2 INJECTION, SOLUTION EPIDURAL; INFILTRATION at 01:01

## 2020-01-02 RX ADMIN — CEFAZOLIN 2 G: 330 INJECTION, POWDER, FOR SOLUTION INTRAMUSCULAR; INTRAVENOUS at 10:01

## 2020-01-02 RX ADMIN — SODIUM CHLORIDE: 0.9 INJECTION, SOLUTION INTRAVENOUS at 09:01

## 2020-01-02 RX ADMIN — ROCURONIUM BROMIDE 30 MG: 10 INJECTION, SOLUTION INTRAVENOUS at 10:01

## 2020-01-02 RX ADMIN — ROPIVACAINE HYDROCHLORIDE 20 ML: 5 INJECTION, SOLUTION EPIDURAL; INFILTRATION; PERINEURAL at 10:01

## 2020-01-02 RX ADMIN — LIDOCAINE HYDROCHLORIDE 60 MG: 20 INJECTION, SOLUTION INTRAVENOUS at 10:01

## 2020-01-02 RX ADMIN — POLYETHYLENE GLYCOL 3350 17 G: 17 POWDER, FOR SOLUTION ORAL at 03:01

## 2020-01-02 RX ADMIN — ONDANSETRON 4 MG: 2 INJECTION INTRAMUSCULAR; INTRAVENOUS at 12:01

## 2020-01-02 RX ADMIN — PROPOFOL 100 MG: 10 INJECTION, EMULSION INTRAVENOUS at 10:01

## 2020-01-02 RX ADMIN — PROPOFOL 30 MG: 10 INJECTION, EMULSION INTRAVENOUS at 10:01

## 2020-01-02 RX ADMIN — ROPIVACAINE HYDROCHLORIDE 2 ML/HR: 2 INJECTION, SOLUTION EPIDURAL; INFILTRATION at 09:01

## 2020-01-02 RX ADMIN — SUGAMMADEX 200 MG: 100 INJECTION, SOLUTION INTRAVENOUS at 12:01

## 2020-01-02 RX ADMIN — FENTANYL CITRATE 25 MCG: 50 INJECTION, SOLUTION INTRAMUSCULAR; INTRAVENOUS at 12:01

## 2020-01-02 RX ADMIN — MELATONIN 2000 UNITS: at 03:01

## 2020-01-02 RX ADMIN — ROCURONIUM BROMIDE 10 MG: 10 INJECTION, SOLUTION INTRAVENOUS at 12:01

## 2020-01-02 RX ADMIN — CEFAZOLIN 2 G: 1 INJECTION, POWDER, FOR SOLUTION INTRAMUSCULAR; INTRAVENOUS at 05:01

## 2020-01-02 RX ADMIN — ROCURONIUM BROMIDE 10 MG: 10 INJECTION, SOLUTION INTRAVENOUS at 10:01

## 2020-01-02 RX ADMIN — METHOCARBAMOL TABLETS 500 MG: 500 TABLET, COATED ORAL at 09:01

## 2020-01-02 RX ADMIN — SODIUM CHLORIDE, SODIUM GLUCONATE, SODIUM ACETATE, POTASSIUM CHLORIDE, MAGNESIUM CHLORIDE, SODIUM PHOSPHATE, DIBASIC, AND POTASSIUM PHOSPHATE: .53; .5; .37; .037; .03; .012; .00082 INJECTION, SOLUTION INTRAVENOUS at 11:01

## 2020-01-02 RX ADMIN — Medication 6 MG: at 09:01

## 2020-01-02 RX ADMIN — SODIUM CHLORIDE: 0.9 INJECTION, SOLUTION INTRAVENOUS at 08:01

## 2020-01-02 RX ADMIN — METHOCARBAMOL TABLETS 500 MG: 500 TABLET, COATED ORAL at 06:01

## 2020-01-02 RX ADMIN — ENOXAPARIN SODIUM 40 MG: 100 INJECTION SUBCUTANEOUS at 06:01

## 2020-01-02 RX ADMIN — ACETAMINOPHEN 1000 MG: 500 TABLET ORAL at 06:01

## 2020-01-02 RX ADMIN — PHENYLEPHRINE HYDROCHLORIDE 100 MCG: 10 INJECTION INTRAVENOUS at 12:01

## 2020-01-02 RX ADMIN — MUPIROCIN 1 G: 20 OINTMENT TOPICAL at 02:01

## 2020-01-02 NOTE — ED NOTES
Left dorsalis pedis pulse palpable, left toes moveable with good capillary refill noted upon blanching nailbeds

## 2020-01-02 NOTE — ASSESSMENT & PLAN NOTE
X-ray showed: Acute proximal left humeral head/neck fracture  Sustained after a fall   Management per ortho

## 2020-01-02 NOTE — CONSULTS
[facility]  Orthopedics  Consult Note    Patient Name: Franny Arnold  MRN: [id number]  Admission Date: 1/1/2020  Hospital Length of Stay: 0 days  Attending Provider: Kamini Collado MD  Primary Care Provider: Dinesh Wylie MD      Inpatient consult to Orthopedic Surgery  Consult performed by: Elias Rolon MD  Consult ordered by: Alejandro Maloney DO        Subjective:     Principal Problem:Closed displaced intertrochanteric fracture of left femur    Chief Complaint:   Chief Complaint   Patient presents with    Fall     pt was last seen yesterday, lives at home alone, unwitnessed fall, left hip pain and deformity.         HPI: Franny Arnold is a 89 y.o. female  with history of no signifanct medical comorbidites presents with  left intertrochanteric femur fx after fall from standing, found on ground at noon this morning. She was last talked with on the phone last night. She reports having fallen while getting out of bed. She reported immediate pain and inability to bear weight. the patient denies head trauma. Patient denies numbness or tingling in extremitiy.  She denies history of prior fractures and denies pain anywhere else. Patient lives at home alone, makes her own medical decisions with assitance from her neice , and ambulates without assitance. Does  not take anticoagulation medication.         History reviewed. No pertinent past medical history.    Past Surgical History:   Procedure Laterality Date    APPENDECTOMY      TONSILLECTOMY         Review of patient's allergies indicates:  No Known Allergies    Current Facility-Administered Medications   Medication    0.9%  NaCl infusion    acetaminophen tablet 1,000 mg    bisacodyl suppository 10 mg    melatonin tablet 6 mg    methocarbamol tablet 1,000 mg    morphine injection 2 mg    ondansetron injection 4 mg    oxyCODONE immediate release tablet 10 mg    oxyCODONE immediate release tablet 5 mg    [START ON 1/2/2020]  polyethylene glycol packet 17 g    pregabalin capsule 75 mg    promethazine (PHENERGAN) 6.25 mg in dextrose 5 % 50 mL IVPB    sodium chloride 0.9% flush 10 mL     No current outpatient medications on file.     Family History     None        Tobacco Use    Smoking status: Never Smoker    Smokeless tobacco: Never Used   Substance and Sexual Activity    Alcohol use: Not Currently    Drug use: Never    Sexual activity: Not Currently     ROS   Per ED ROS 1/1/20  Objective:     Vital Signs (Most Recent):  Temp: 98.5 °F (36.9 °C) (01/01/20 1809)  Pulse: 94 (01/01/20 1809)  Resp: 16 (01/01/20 1809)  BP: (!) 151/78 (01/01/20 1809)  SpO2: 99 % (01/01/20 1809) Vital Signs (24h Range):  Temp:  [98.3 °F (36.8 °C)-98.5 °F (36.9 °C)] 98.5 °F (36.9 °C)  Pulse:  [] 94  Resp:  [16-20] 16  SpO2:  [96 %-99 %] 99 %  BP: (133-151)/(78-83) 151/78           There is no height or weight on file to calculate BMI.      Intake/Output Summary (Last 24 hours) at 1/1/2020 1900  Last data filed at 1/1/2020 1746  Gross per 24 hour   Intake 1000 ml   Output --   Net 1000 ml       Ortho/SPM Exam  LLE  Shortened, skin intact with mod swelling over lateral hip  Pain with Log roll of leg  No bony TTP throughout  Compartments soft  Painless ROM ankle   SILT Sa/Kiser/DP/SP/T  Motor intact TA/SP/DP  2+ DP and PT     RLE:  Skin intact, no deformity  No TTP  Compartments soft  Full painless ROM throughout lower extremity  SILT Sa/Kiser/DP/SP/T  Motor intact TA/SP/DP  2+ DP/PT     LUE:  Skin intact, signifcant echymosis left shoulder  No open wounds/abrasions/crepitus  No bony TTP  FROM shoulder, elbow and wrist  SILT M/U/R  Motor intact AIN/PIN/M/U/R   Cap refill < 2s  2+ RP    RUE  Skin intact, no deformity noted  No open wounds/abrasions/crepitus  No bony TTP  FROM shoulder, elbow and wrist  SILT M/U/R  Motor intact AIN/PIN/M/U/R   Cap refill < 2s  2+ RP      Spine: No TTP along spine, no step offs palpated. no sacral decubitus  ulcers      Significant Labs:   CBC:   Recent Labs   Lab 01/01/20  1527   WBC 24.52*   HGB 14.5   HCT 46.0        CMP:   Recent Labs   Lab 01/01/20  1527      K 4.5      CO2 24   *   BUN 26   CREATININE 0.9   CALCIUM 9.9   PROT 7.4   ALBUMIN 4.1   BILITOT 1.6*   ALKPHOS 65   AST 48*   ALT 26   ANIONGAP 11   EGFRNONAA 54.5*     All pertinent labs within the past 24 hours have been reviewed.    Significant Imaging: I have reviewed all pertinent imaging results/findings.   Xray left hip: intertroch femur fx  Xray left shoulder: surgical neck fx  Xray humerus, left femur, chest WNL except as above    Assessment/Plan:     * Closed displaced intertrochanteric fracture of left femur  Franny Arnold is a 89 y.o. female with Left intertrochanteric femur fracture, closed, NVI.  Patient was explained in detail the severity of the injury that was suffered. Patient was explained the risks/benefits/and alternatives to operative management in detail including infection, bleeding, pain, nerve and vascular damage, heterotopic ossification, leg length discrepancies, rotational deformities and they express full understanding.  Also, the patient was explained the high mortality, over 30 percent,  associated with these fractures. She and niece expresses full understanding of the condition and expresses that they want to proceed with surgery. The patient is admitted to the medicine hip fracture service for optimization of medical comorbidities. Will plan for OR tomorrow. No Guarantees were made, informed consent was obtained. All questions were answered to patient's and family's satisfaction.     -Admitted to medicine hip fracture service  -NPO midnight  -Pain control per primary  -Marked, booked, and consented for surgery  -PT/OT: Bed rest  -DVT PPx: Hold anticoagulation  -Abx: Preop abx ordered  -Labs: ordered, WBC 24, CPK 1700, Creatinine .8, inr  1.1.   -Pérez: In place, pérez care  -Iv: ordered for  contralateral arm            Fracture of proximal end of left humerus  Sling and swath, NWB KATELYN Rolon MD  Orthopedics  [facility]

## 2020-01-02 NOTE — ANESTHESIA PROCEDURE NOTES
Intubation  Performed by: Yamini Marie CRNA  Authorized by: Mo Olmedo MD     Intubation:     Induction:  Intravenous    Intubated:  Postinduction    Mask Ventilation:  Easy mask    Attempts:  1    Attempted By:  CRNA    Method of Intubation:  Direct    Blade:  Jennings 2    Laryngeal View Grade: Grade I - full view of chords      Difficult Airway Encountered?: No      Complications:  None    Airway Device:  Oral endotracheal tube    Airway Device Size:  7.0    Style/Cuff Inflation:  Cuffed (inflated to minimal occlusive pressure)    Inflation Amount (mL):  6    Tube secured:  22    Secured at:  The lips    Placement Verified By:  Capnometry    Complicating Factors:  None    Findings Post-Intubation:  BS equal bilateral

## 2020-01-02 NOTE — PROGRESS NOTES
Anesthesia consent being completed again.  Pt disoriented to time and situation, but signed previous consent.  Pt's niece, and POA, at bedside to sign consent.

## 2020-01-02 NOTE — ASSESSMENT & PLAN NOTE
Franny Arnold is a 89 y.o. female with Left intertrochanteric femur fracture, closed, NVI.  Patient was explained in detail the severity of the injury that was suffered. Patient was explained the risks/benefits/and alternatives to operative management in detail including infection, bleeding, pain, nerve and vascular damage, heterotopic ossification, leg length discrepancies, rotational deformities and they express full understanding.  Also, the patient was explained the high mortality, over 30 percent,  associated with these fractures. She and niece expresses full understanding of the condition and expresses that they want to proceed with surgery. The patient is admitted to the medicine hip fracture service for optimization of medical comorbidities. Will plan for OR tomorrow. No Guarantees were made, informed consent was obtained. All questions were answered to patient's and family's satisfaction.     -Admitted to medicine hip fracture service  -NPO midnight  -Pain control per primary  -Marked, booked, and consented for surgery  -PT/OT: Bed rest  -DVT PPx: Hold anticoagulation  -Abx: Preop abx ordered  -Labs: ordered,   -Pérez: In place, pérez care  -Iv: ordered for contralateral arm

## 2020-01-02 NOTE — NURSING TRANSFER
Nursing Transfer Note      1/2/2020     Transfer To: 509A from PACU    Transfer via bed    Transfer with  to O2, cardiac monitoring    Transported by Patient escort    Medicines sent: yes    Chart send with patient: Yes    Notified: daughter

## 2020-01-02 NOTE — SUBJECTIVE & OBJECTIVE
History reviewed. No pertinent past medical history.    Past Surgical History:   Procedure Laterality Date    APPENDECTOMY      TONSILLECTOMY         Review of patient's allergies indicates:  No Known Allergies    Current Facility-Administered Medications   Medication    0.9%  NaCl infusion    acetaminophen tablet 1,000 mg    bisacodyl suppository 10 mg    melatonin tablet 6 mg    methocarbamol tablet 1,000 mg    morphine injection 2 mg    ondansetron injection 4 mg    oxyCODONE immediate release tablet 10 mg    oxyCODONE immediate release tablet 5 mg    [START ON 1/2/2020] polyethylene glycol packet 17 g    pregabalin capsule 75 mg    promethazine (PHENERGAN) 6.25 mg in dextrose 5 % 50 mL IVPB    sodium chloride 0.9% flush 10 mL     No current outpatient medications on file.     Family History     None        Tobacco Use    Smoking status: Never Smoker    Smokeless tobacco: Never Used   Substance and Sexual Activity    Alcohol use: Not Currently    Drug use: Never    Sexual activity: Not Currently     ROS   Per ED ROS 1/1/20  Objective:     Vital Signs (Most Recent):  Temp: 98.5 °F (36.9 °C) (01/01/20 1809)  Pulse: 94 (01/01/20 1809)  Resp: 16 (01/01/20 1809)  BP: (!) 151/78 (01/01/20 1809)  SpO2: 99 % (01/01/20 1809) Vital Signs (24h Range):  Temp:  [98.3 °F (36.8 °C)-98.5 °F (36.9 °C)] 98.5 °F (36.9 °C)  Pulse:  [] 94  Resp:  [16-20] 16  SpO2:  [96 %-99 %] 99 %  BP: (133-151)/(78-83) 151/78           There is no height or weight on file to calculate BMI.      Intake/Output Summary (Last 24 hours) at 1/1/2020 1900  Last data filed at 1/1/2020 1746  Gross per 24 hour   Intake 1000 ml   Output --   Net 1000 ml       Ortho/SPM Exam  LLE  Shortened, skin intact with mod swelling over lateral hip  Pain with Log roll of leg  No bony TTP throughout  Compartments soft  Painless ROM ankle   SILT Sa/Kiser/DP/SP/T  Motor intact TA/SP/DP  2+ DP and PT     RLE:  Skin intact, no deformity  No  TTP  Compartments soft  Full painless ROM throughout lower extremity  SILT Sa/Kiser/DP/SP/T  Motor intact TA/SP/DP  2+ DP/PT     LUE:  Skin intact, signifcant echymosis left shoulder  No open wounds/abrasions/crepitus  No bony TTP  FROM shoulder, elbow and wrist  SILT M/U/R  Motor intact AIN/PIN/M/U/R   Cap refill < 2s  2+ RP    RUE  Skin intact, no deformity noted  No open wounds/abrasions/crepitus  No bony TTP  FROM shoulder, elbow and wrist  SILT M/U/R  Motor intact AIN/PIN/M/U/R   Cap refill < 2s  2+ RP      Spine: No TTP along spine, no step offs palpated. no sacral decubitus ulcers      Significant Labs:   CBC:   Recent Labs   Lab 01/01/20  1527   WBC 24.52*   HGB 14.5   HCT 46.0        CMP:   Recent Labs   Lab 01/01/20  1527      K 4.5      CO2 24   *   BUN 26   CREATININE 0.9   CALCIUM 9.9   PROT 7.4   ALBUMIN 4.1   BILITOT 1.6*   ALKPHOS 65   AST 48*   ALT 26   ANIONGAP 11   EGFRNONAA 54.5*     All pertinent labs within the past 24 hours have been reviewed.    Significant Imaging: I have reviewed all pertinent imaging results/findings.   Xray left hip: intertroch femur fx  Xray left shoulder: surgical neck fx  Xray humerus, left femur, chest WNL except as above

## 2020-01-02 NOTE — SUBJECTIVE & OBJECTIVE
Principal Problem:Closed displaced intertrochanteric fracture of left femur    Principal Orthopedic Problem: Same     Interval History: Patient seen and examined at bedside.  No acute events overnight.  DVT negative.  Hgb 12.1, WBC trending down with 17.59.  .      Review of patient's allergies indicates:  No Known Allergies    Current Facility-Administered Medications   Medication    0.9%  NaCl infusion    acetaminophen tablet 1,000 mg    bisacodyl suppository 10 mg    ceFAZolin injection 2 g    fentaNYL injection 25 mcg    lidocaine (PF) 10 mg/ml (1%) injection 10 mg    melatonin tablet 6 mg    methocarbamol tablet 1,000 mg    midazolam (VERSED) 1 mg/mL injection 1 mg    morphine injection 2 mg    mupirocin 2 % ointment 1 g    mupirocin 2 % ointment    ondansetron injection 4 mg    oxyCODONE immediate release tablet 10 mg    oxyCODONE immediate release tablet 5 mg    polyethylene glycol packet 17 g    pregabalin capsule 75 mg    promethazine (PHENERGAN) 6.25 mg in dextrose 5 % 50 mL IVPB    sodium chloride 0.9% flush 10 mL    tranexamic acid (CYKLOKAPRON) 1,000 mg in sodium chloride 0.9 % 100 mL (ready to mix system)    tranexamic acid (CYKLOKAPRON) 3,000 mg in sodium chloride 0.9% 100 mL     Objective:     Vital Signs (Most Recent):  Temp: 97.7 °F (36.5 °C) (01/02/20 0425)  Pulse: 89 (01/02/20 0425)  Resp: 14 (01/02/20 0425)  BP: 126/68 (01/02/20 0425)  SpO2: (!) 94 % (01/02/20 0425) Vital Signs (24h Range):  Temp:  [97.7 °F (36.5 °C)-98.5 °F (36.9 °C)] 97.7 °F (36.5 °C)  Pulse:  [] 89  Resp:  [14-20] 14  SpO2:  [94 %-99 %] 94 %  BP: (126-154)/(68-83) 126/68           There is no height or weight on file to calculate BMI.      Intake/Output Summary (Last 24 hours) at 1/2/2020 0635  Last data filed at 1/2/2020 0400  Gross per 24 hour   Intake 1000 ml   Output 1050 ml   Net -50 ml       Ortho/SPM Exam     Ortho/SPM Exam  LLE  Shortened, skin intact with mod swelling over lateral  hip  Pain with Log roll of leg  No bony TTP throughout  Compartments soft  Painless ROM ankle   SILT Sa/Kiser/DP/SP/T  Motor intact TA/SP/DP  2+ DP and PT     RLE:  Skin intact, no deformity  No TTP  Compartments soft  Full painless ROM throughout lower extremity  SILT Sa/Kiser/DP/SP/T  Motor intact TA/SP/DP  2+ DP/PT     LUE:  Skin intact, signifcant echymosis left shoulder  No open wounds/abrasions/crepitus  No bony TTP  FROM shoulder, elbow and wrist  SILT M/U/R  Motor intact AIN/PIN/M/U/R   Cap refill < 2s  2+ RP     RUE  Skin intact, no deformity noted  No open wounds/abrasions/crepitus  No bony TTP  FROM shoulder, elbow and wrist  SILT M/U/R  Motor intact AIN/PIN/M/U/R   Cap refill < 2s  2+ RP        Spine: No TTP along spine, no step offs palpated. no sacral decubitus ulcers       Significant Labs:   CBC:   Recent Labs   Lab 01/01/20  1527 01/02/20  0355   WBC 24.52* 17.59*   HGB 14.5 12.1   HCT 46.0 37.7    218     All pertinent labs within the past 24 hours have been reviewed.    Significant Imaging: I have reviewed and interpreted all pertinent imaging results/findings.

## 2020-01-02 NOTE — ED NOTES
Hilda in WAR room notified tele box [id number] was placed on pt  NSR with heart rate 94 bpm confirmed by Hilda

## 2020-01-02 NOTE — ANESTHESIA PROCEDURE NOTES
Left SIFI catheter    Patient location during procedure: pre-op   Block not for primary anesthetic.  Reason for block: at surgeon's request and post-op pain management   Post-op Pain Location: left leg pain  Start time: 1/2/2020 9:22 AM  Timeout: 1/2/2020 9:20 AM   End time: 1/2/2020 9:35 AM    Staffing  Authorizing Provider: Eduardo Gardner MD  Performing Provider: Elizabet Ramirez MD    Preanesthetic Checklist  Completed: patient identified, site marked, surgical consent, pre-op evaluation, timeout performed, IV checked, risks and benefits discussed and monitors and equipment checked  Peripheral Block  Patient position: supine  Prep: ChloraPrep and site prepped and draped  Patient monitoring: heart rate, cardiac monitor, continuous pulse ox, continuous capnometry and frequent blood pressure checks  Block type: fascia iliaca (Suprainguinal fascia iliaca)  Laterality: left  Injection technique: continuous  Needle  Needle type: Tuohy   Needle gauge: 17 G  Needle length: 3.5 in  Needle localization: anatomical landmarks and ultrasound guidance  Catheter type: spring wound  Catheter size: 19 G  Test dose: lidocaine 1.5% with Epi 1-to-200,000 and negative   -ultrasound image captured on disc.  Assessment  Injection assessment: negative aspiration, negative parasthesia and local visualized surrounding nerve  Paresthesia pain: none  Heart rate change: no  Slow fractionated injection: yes  Additional Notes  VSS.  [facility] RN monitoring vitals throughout procedure.  Patient tolerated procedure well.

## 2020-01-02 NOTE — ASSESSMENT & PLAN NOTE
Unclear etiology  UA not suggestive of UTI  CXR WNL   Afebrile   Will continue to monitor with daily CBCs

## 2020-01-02 NOTE — ASSESSMENT & PLAN NOTE
Sustained while trying to get out of bed  X-ray showed: Acute intertrochanteric fracture of the proximal left femur  Appreciate ortho co-management  To OR today

## 2020-01-02 NOTE — ANESTHESIA PREPROCEDURE EVALUATION
[facility] [address]  Anesthesia Pre-Operative Evaluation         Patient Name: Franny Arnold  YOB: 1924  MRN: [id number]    SUBJECTIVE:     Pre-operative evaluation for Procedure(s) (LRB):  INSERTION, INTRAMEDULLARY ZAYNAB, left, hana table, synthes, large C arm clock side (Left)     01/01/2020    Franny Arnold is a 89 y.o. female w/ no significant PMHx or co-morbidities. She presented with left intertrochanteric femur fx after fall from standing. She was found on ground around noon (on 1/1/19).     Patient now presents for the above procedure(s).      LDA:        Peripheral IV - Single Lumen 01/01/20 1525 20 G Right Antecubital (Active)   Site Assessment Clean;Dry;Intact 1/1/2020  3:25 PM   Line Status Blood return noted;Flushed 1/1/2020  3:25 PM   Number of days: 0            Urethral Catheter 01/01/20 1647 Latex 16 Fr. (Active)   Number of days: 0       Prev airway: None documented.    Drips:    sodium chloride 0.9% 100 mL/hr at 01/01/20 2111       Patient Active Problem List   Diagnosis    Closed displaced intertrochanteric fracture of left femur    Fracture of proximal end of left humerus    Leukocytosis       Review of patient's allergies indicates:  No Known Allergies    Current Inpatient Medications:   acetaminophen  1,000 mg Oral Q8H    [START ON 1/2/2020] polyethylene glycol  17 g Oral Daily    pregabalin  75 mg Oral QHS       No current facility-administered medications on file prior to encounter.      No current outpatient medications on file prior to encounter.       Past Surgical History:   Procedure Laterality Date    APPENDECTOMY      TONSILLECTOMY         Social History     Socioeconomic History    Marital status:      Spouse name: Not on file    Number of children: Not on file    Years of education: Not on file    Highest education level: Not on file   Occupational History    Not on file   Social Needs    Financial resource strain: Not on  file    Food insecurity:     Worry: Not on file     Inability: Not on file    Transportation needs:     Medical: Not on file     Non-medical: Not on file   Tobacco Use    Smoking status: Never Smoker    Smokeless tobacco: Never Used   Substance and Sexual Activity    Alcohol use: Not Currently    Drug use: Never    Sexual activity: Not Currently   Lifestyle    Physical activity:     Days per week: Not on file     Minutes per session: Not on file    Stress: Not on file   Relationships    Social connections:     Talks on phone: Not on file     Gets together: Not on file     Attends Zoroastrian service: Not on file     Active member of club or organization: Not on file     Attends meetings of clubs or organizations: Not on file     Relationship status: Not on file   Other Topics Concern    Not on file   Social History Narrative    Not on file       OBJECTIVE:     Vital Signs Range (Last 24H):  Temp:  [36.6 °C (97.8 °F)-36.9 °C (98.5 °F)]   Pulse:  []   Resp:  [16-20]   BP: (133-154)/(74-83)   SpO2:  [96 %-99 %]       Significant Labs:  Lab Results   Component Value Date    WBC 24.52 (H) 01/01/2020    HGB 14.5 01/01/2020    HCT 46.0 01/01/2020     01/01/2020    ALT 26 01/01/2020    AST 48 (H) 01/01/2020     01/01/2020    K 4.5 01/01/2020     01/01/2020    CREATININE 0.9 01/01/2020    BUN 26 01/01/2020    CO2 24 01/01/2020    INR 1.1 01/01/2020       Diagnostic Studies: No relevant studies.    EKG:     No results found for this or any previous visit.      2D ECHO:  No results found for this or any previous visit.      ASSESSMENT/PLAN:       Anesthesia Evaluation    I have reviewed the Patient Summary Reports.     I have reviewed the Medications.     Review of Systems  Anesthesia Hx:  Denies Hx of Anesthetic complications  Denies Family Hx of Anesthesia complications.   Denies Personal Hx of Anesthesia complications.   Hematology/Oncology:        Denies Current/Recent Cancer -- Denies  Cancer in past history:    Pulmonary:   Denies Shortness of breath.  Denies Recent URI.    Hepatic/GI:  Hepatic/GI Normal    Endocrine:  Endocrine Normal        Physical Exam  General:  Well nourished    Airway/Jaw/Neck:  Airway Findings: Mouth Opening: Normal General Airway Assessment: Adult  Mallampati: III  Improves to II with phonation.  TM Distance: Normal, at least 6 cm  Jaw/Neck Findings:     Neck ROM: Normal ROM      Dental:  Dental Findings: (Recent Cuts/bites on lips ) In tact    Chest/Lungs:  Chest/Lungs Findings: Clear to auscultation, Normal Respiratory Rate     Heart/Vascular:  Heart Findings: Rate: Normal        Mental Status:  Mental Status Findings:  Cooperative, Alert and Oriented         Anesthesia Plan  Type of Anesthesia, risks & benefits discussed:  Anesthesia Type:  general, MAC  Patient's Preference:   Intra-op Monitoring Plan: standard ASA monitors  Intra-op Monitoring Plan Comments:   Post Op Pain Control Plan: multimodal analgesia  Post Op Pain Control Plan Comments:   Induction:   IV  Beta Blocker:  Patient is not currently on a Beta-Blocker (No further documentation required).       Informed Consent: Patient understands risks and agrees with Anesthesia plan.  Questions answered. Anesthesia consent signed with patient.  ASA Score: 3     Day of Surgery Review of History & Physical:    H&P update referred to the surgeon.         Ready For Surgery From Anesthesia Perspective.

## 2020-01-02 NOTE — PROGRESS NOTES
[facility] Hospital Medicine  Progress Note    Patient Name: Franny Arnold  MRN: [id number]  Patient Class: IP- Inpatient   Admission Date: 1/1/2020  Length of Stay: 1 days  Attending Physician: Kamini Collado MD  Primary Care Provider: Dinesh Wylie MD    Hospital Medicine Team: [facility] Kamini Collado MD    Subjective:     Principal Problem:Closed displaced intertrochanteric fracture of left femur        HPI:  No notes on file    Overview/Hospital Course:  To OR today    Review of Systems   Unable to perform ROS: Other   In OR    Physical Exam  Not performed; pt in surgery    Assessment/Plan:      * Closed displaced intertrochanteric fracture of left femur  Sustained while trying to get out of bed  X-ray showed: Acute intertrochanteric fracture of the proximal left femur  Appreciate ortho co-management  To OR today      Leukocytosis  Unclear etiology  UA not suggestive of UTI  CXR WNL   Afebrile   Will continue to monitor with daily CBCs    Fracture of proximal end of left humerus  X-ray showed: Acute proximal left humeral head/neck fracture  Sustained after a fall   Management per ortho         VTE Risk Mitigation (From admission, onward)         Ordered     IP VTE HIGH RISK PATIENT  Once      01/02/20 0749     Place sequential compression device  Until discontinued      01/02/20 0749     Place ADAIR hose  Until discontinued      01/02/20 0749     Place sequential compression device  Until discontinued      01/01/20 1815                      Kamini Collado MD  Department of Hospital Medicine   [facility]

## 2020-01-02 NOTE — PLAN OF CARE
Patient easily arousable to verbal  stimulation. Sleeping between care Oxygen sat while sleeping 92%. O2 Sat while awake 95%.  Denies pain.  O2 2 lnc place on pt for  Transport. Telemetry signal verified going through per tele tech.

## 2020-01-02 NOTE — ED NOTES
Pt transported to room 508 a via stretcher with escort on tele box  Pt has NS 1 L infusing per right AC at 100 ml per hr per pump with 886 ml left TBA  Pt condition stable on leaving the ED, pt belongings are with her family and pt's family notified of room number

## 2020-01-02 NOTE — PROGRESS NOTES
[facility]  Orthopedics  Progress Note    Patient Name: Franny Arnold  MRN: [id number]  Admission Date: 1/1/2020  Hospital Length of Stay: 1 days  Attending Provider: Kamini Collado MD  Primary Care Provider: Dinesh Wylie MD  Follow-up For: Procedure(s) (LRB):  INSERTION, INTRAMEDULLARY ZAYNAB, left, hana table, synthes, large C arm clock side (Left)    Post-Operative Day:    Subjective:     Principal Problem:Closed displaced intertrochanteric fracture of left femur    Principal Orthopedic Problem: Same     Interval History: Patient seen and examined at bedside.  No acute events overnight.  DVT negative.  Hgb 12.1, WBC trending down with 17.59.  .      Review of patient's allergies indicates:  No Known Allergies    Current Facility-Administered Medications   Medication    0.9%  NaCl infusion    acetaminophen tablet 1,000 mg    bisacodyl suppository 10 mg    ceFAZolin injection 2 g    fentaNYL injection 25 mcg    lidocaine (PF) 10 mg/ml (1%) injection 10 mg    melatonin tablet 6 mg    methocarbamol tablet 1,000 mg    midazolam (VERSED) 1 mg/mL injection 1 mg    morphine injection 2 mg    mupirocin 2 % ointment 1 g    mupirocin 2 % ointment    ondansetron injection 4 mg    oxyCODONE immediate release tablet 10 mg    oxyCODONE immediate release tablet 5 mg    polyethylene glycol packet 17 g    pregabalin capsule 75 mg    promethazine (PHENERGAN) 6.25 mg in dextrose 5 % 50 mL IVPB    sodium chloride 0.9% flush 10 mL    tranexamic acid (CYKLOKAPRON) 1,000 mg in sodium chloride 0.9 % 100 mL (ready to mix system)    tranexamic acid (CYKLOKAPRON) 3,000 mg in sodium chloride 0.9% 100 mL     Objective:     Vital Signs (Most Recent):  Temp: 97.7 °F (36.5 °C) (01/02/20 0425)  Pulse: 89 (01/02/20 0425)  Resp: 14 (01/02/20 0425)  BP: 126/68 (01/02/20 0425)  SpO2: (!) 94 % (01/02/20 0425) Vital Signs (24h Range):  Temp:  [97.7 °F (36.5 °C)-98.5 °F (36.9 °C)] 97.7 °F (36.5 °C)  Pulse:   [] 89  Resp:  [14-20] 14  SpO2:  [94 %-99 %] 94 %  BP: (126-154)/(68-83) 126/68           There is no height or weight on file to calculate BMI.      Intake/Output Summary (Last 24 hours) at 1/2/2020 0635  Last data filed at 1/2/2020 0400  Gross per 24 hour   Intake 1000 ml   Output 1050 ml   Net -50 ml       Ortho/SPM Exam     Ortho/SPM Exam  LLE  Shortened, skin intact with mod swelling over lateral hip  Pain with Log roll of leg  No bony TTP throughout  Compartments soft  Painless ROM ankle   SILT Sa/Kiser/DP/SP/T  Motor intact TA/SP/DP  2+ DP and PT     RLE:  Skin intact, no deformity  No TTP  Compartments soft  Full painless ROM throughout lower extremity  SILT Sa/Kiser/DP/SP/T  Motor intact TA/SP/DP  2+ DP/PT     LUE:  Skin intact, signifcant echymosis left shoulder  No open wounds/abrasions/crepitus  No bony TTP  FROM shoulder, elbow and wrist  SILT M/U/R  Motor intact AIN/PIN/M/U/R   Cap refill < 2s  2+ RP     RUE  Skin intact, no deformity noted  No open wounds/abrasions/crepitus  No bony TTP  FROM shoulder, elbow and wrist  SILT M/U/R  Motor intact AIN/PIN/M/U/R   Cap refill < 2s  2+ RP        Spine: No TTP along spine, no step offs palpated. no sacral decubitus ulcers       Significant Labs:   CBC:   Recent Labs   Lab 01/01/20  1527 01/02/20  0355   WBC 24.52* 17.59*   HGB 14.5 12.1   HCT 46.0 37.7    218     All pertinent labs within the past 24 hours have been reviewed.    Significant Imaging: I have reviewed and interpreted all pertinent imaging results/findings.    Assessment/Plan:     * Closed displaced intertrochanteric fracture of left femur  Franny Arnold is a 89 y.o. female with Left intertrochanteric femur fracture, closed, NVI.  Patient was explained in detail the severity of the injury that was suffered. Patient was explained the risks/benefits/and alternatives to operative management in detail including infection, bleeding, pain, nerve and vascular damage, heterotopic ossification, leg  length discrepancies, rotational deformities and they express full understanding.  Also, the patient was explained the high mortality, over 30 percent,  associated with these fractures. She and niece expresses full understanding of the condition and expresses that they want to proceed with surgery. The patient is admitted to the medicine hip fracture service for optimization of medical comorbidities. Will plan for OR today. No Guarantees were made, informed consent was obtained. All questions were answered to patient's and family's satisfaction.     -Admitted to medicine hip fracture service  -NPO   -Pain control per primary  -Marked, booked, and consented for surgery  -PT/OT: Bed rest  -DVT PPx: Hold anticoagulation  -Abx: Preop abx ordered  -Labs:  DVT negative.  Hgb 12.1, WBC trending down with 17.59.  .  -Pérez: In place, pérez care  -Iv: ordered for contralateral arm  - To OR this morning.    - Plan for non operative management of proximal humerus         Fracture of proximal end of left humerus  Sling and swath, ASHLEY Varghese MD  Orthopedics  [facility]

## 2020-01-02 NOTE — TRANSFER OF CARE
"Anesthesia Transfer of Care Note    Patient: Franny Arnold    Procedure(s) Performed: Procedure(s) (LRB):  INSERTION, INTRAMEDULLARY ZAYNAB (Left)    Patient location: PACU    Anesthesia Type: general    Transport from OR: Transported from OR on 6-10 L/min O2 by face mask with adequate spontaneous ventilation    Post pain: adequate analgesia    Post assessment: no apparent anesthetic complications and tolerated procedure well    Post vital signs: stable    Level of consciousness: sedated and responds to stimulation    Nausea/Vomiting: no nausea/vomiting    Complications: none    Transfer of care protocol was followed      Last vitals:   Visit Vitals  BP (!) 143/84 (BP Location: Right arm, Patient Position: Lying)   Pulse 79   Temp 36.6 °C (97.9 °F) (Temporal)   Resp 16   Ht 5' 4" (1.626 m)   Wt 54.4 kg (120 lb)   LMP  (LMP Unknown)   SpO2 100%   Breastfeeding? No   BMI 20.60 kg/m²     "

## 2020-01-02 NOTE — ASSESSMENT & PLAN NOTE
Franny Arnold is a 89 y.o. female with Left intertrochanteric femur fracture, closed, NVI.  Patient was explained in detail the severity of the injury that was suffered. Patient was explained the risks/benefits/and alternatives to operative management in detail including infection, bleeding, pain, nerve and vascular damage, heterotopic ossification, leg length discrepancies, rotational deformities and they express full understanding.  Also, the patient was explained the high mortality, over 30 percent,  associated with these fractures. She and niece expresses full understanding of the condition and expresses that they want to proceed with surgery. The patient is admitted to the medicine hip fracture service for optimization of medical comorbidities. Will plan for OR today. No Guarantees were made, informed consent was obtained. All questions were answered to patient's and family's satisfaction.     -Admitted to medicine hip fracture service  -NPO   -Pain control per primary  -Marked, booked, and consented for surgery  -PT/OT: Bed rest  -DVT PPx: Hold anticoagulation  -Abx: Preop abx ordered  -Labs:  DVT negative.  Hgb 12.1, WBC trending down with 17.59.  .  -Pérez: In place, pérez care  -Iv: ordered for contralateral arm  - To OR this morning.    - Plan for non operative management of proximal humerus

## 2020-01-02 NOTE — HPI
Franny Arnold is a 89 y.o. female  with history of no signifanct medical comorbidites presents with left intertrochanteric femur fx after fall from standing, found on ground at noon this morning. She was last talked with on the phone last night. She reports having fallen while getting out of bed. She reported immediate pain and inability to bear weight. the patient denies head trauma. Patient denies numbness or tingling in extremitiy. She denies history of prior fractures and denies pain anywhere else. Patient lives at home alone, makes her own medical decisions with assitance from her neice , and ambulates without assitance. Does not take anticoagulation medication.

## 2020-01-02 NOTE — H&P
Hospital Medicine  History and Physical Exam    Team: [facility] MED H Seng Vera MD  Admit Date: 1/1/2020  Principal Problem:  Closed displaced intertrochanteric fracture of left femur   Patient information was obtained from patient, past medical records and ER records.   Primary care Physician: Dinesh Wylie MD  Code status: Full Code    HPI:   89 yo F with no significant PMHx was in her usual state of health until early this morning around 8am when she reports that she fell while attempting to get out of bed and landed on her L side. The patient reports that the fall was mechanical and denies any lightheadedness, palpitations, chest pains, or SOB leading up to the fall. The patient says that she was on the ground for maybe an hour before her nice's  found her. The patient's nice states that she believes the patient may be underestimating the amount of time she was on the ground before being found, however.     The patient reports that she is on no medications and has no prior history of heart or lung disease. She has never smoked. She lives on her own and completes all of her ADLs. She is able to ambulate without any assistance. She states that she can walk up a flight of stairs without any chest pain or SOB. The patient's niece does report that she has been a little more unsteady on her feet in recent years, but she has not had any significant falls prior to today.    No results found for: HGBA1C    Past Medical History: Patient has no past medical history on file.    Past Surgical History: Patient has a past surgical history that includes Appendectomy and Tonsillectomy.    Social History: Patient reports that she has never smoked. She has never used smokeless tobacco. She reports that she drank alcohol. She reports that she does not use drugs.    Family History: family history is not on file.    Medications: reviewed     Allergies: Patient has No Known Allergies.    ROS  Pain Scale: 0 /10    Constitutional: no fever or chills  Respiratory: no cough or shortness of breath  Cardiovascular: no chest pain or palpitations  Gastrointestinal: no nausea or vomiting, no abdominal pain or change in bowel habits  Genitourinary: no hematuria or dysuria  Integument/Breast: no rash or pruritis  Hematologic/Lymphatic: no easy bruising or lymphadenopathy  Musculoskeletal: Positive L arm bruise and L hip swelling  Neurological: no seizures or tremors  Behavioral/Psych: no depression or anxiety    PEx  Temp:  [98.3 °F (36.8 °C)-98.5 °F (36.9 °C)]   Pulse:  []   Resp:  [16-20]   BP: (133-151)/(78-83)   SpO2:  [96 %-99 %]   There is no height or weight on file to calculate BMI.     Intake/Output Summary (Last 24 hours) at 1/1/2020 1854  Last data filed at 1/1/2020 1746  Gross per 24 hour   Intake 1000 ml   Output --   Net 1000 ml       General appearance: no distress, pt. Resting comfortably  Mental status: Alert and oriented x 3  HEENT:  conjunctivae/corneas clear, PERRL  Neck: supple, thyroid not enlarged  Pulm:   normal respiratory effort, CTA B, no c/w/r  Card: RRR, S1, S2 normal, no murmur, click, rub or gallop  Abd: soft, NT, ND, BS present; no masses, no organomegaly  Ext: Significant bruising/swelling noted to L proximal upper extremity with ROM limited by pain. Significant swelling and tenderness of L proximal lower extremity, pt. Immobilized.  Pulses: 2+, symmetric  Skin: color, texture, turgor normal. No rashes or lesions  Neuro: CN II-XII grossly intact, no focal numbness or weakness, normal strength and tone     Recent Results (from the past 24 hour(s))   CBC auto differential    Collection Time: 01/01/20  3:27 PM   Result Value Ref Range    WBC 24.52 (H) 3.90 - 12.70 K/uL    RBC 5.26 4.00 - 5.40 M/uL    Hemoglobin 14.5 12.0 - 16.0 g/dL    Hematocrit 46.0 37.0 - 48.5 %    Mean Corpuscular Volume 88 82 - 98 fL    Mean Corpuscular Hemoglobin 27.6 27.0 - 31.0 pg    Mean Corpuscular Hemoglobin Conc 31.5  (L) 32.0 - 36.0 g/dL    RDW 15.1 (H) 11.5 - 14.5 %    Platelets 258 150 - 350 K/uL    MPV 9.6 9.2 - 12.9 fL    Immature Granulocytes 0.7 (H) 0.0 - 0.5 %    Gran # (ANC) 21.6 (H) 1.8 - 7.7 K/uL    Immature Grans (Abs) 0.18 (H) 0.00 - 0.04 K/uL    Lymph # 0.9 (L) 1.0 - 4.8 K/uL    Mono # 1.8 (H) 0.3 - 1.0 K/uL    Eos # 0.0 0.0 - 0.5 K/uL    Baso # 0.03 0.00 - 0.20 K/uL    nRBC 0 0 /100 WBC    Gran% 87.9 (H) 38.0 - 73.0 %    Lymph% 3.8 (L) 18.0 - 48.0 %    Mono% 7.5 4.0 - 15.0 %    Eosinophil% 0.0 0.0 - 8.0 %    Basophil% 0.1 0.0 - 1.9 %    Differential Method Automated    Comprehensive metabolic panel    Collection Time: 01/01/20  3:27 PM   Result Value Ref Range    Sodium 140 136 - 145 mmol/L    Potassium 4.5 3.5 - 5.1 mmol/L    Chloride 105 95 - 110 mmol/L    CO2 24 23 - 29 mmol/L    Glucose 138 (H) 70 - 110 mg/dL    BUN, Bld 26 10 - 30 mg/dL    Creatinine 0.9 0.5 - 1.4 mg/dL    Calcium 9.9 8.7 - 10.5 mg/dL    Total Protein 7.4 6.0 - 8.4 g/dL    Albumin 4.1 3.5 - 5.2 g/dL    Total Bilirubin 1.6 (H) 0.1 - 1.0 mg/dL    Alkaline Phosphatase 65 55 - 135 U/L    AST 48 (H) 10 - 40 U/L    ALT 26 10 - 44 U/L    Anion Gap 11 8 - 16 mmol/L    eGFR if African American >60.0 >60 mL/min/1.73 m^2    eGFR if non African American 54.5 (A) >60 mL/min/1.73 m^2   Troponin I    Collection Time: 01/01/20  3:27 PM   Result Value Ref Range    Troponin I 0.017 0.000 - 0.026 ng/mL   Protime-INR    Collection Time: 01/01/20  3:27 PM   Result Value Ref Range    Prothrombin Time 10.9 9.0 - 12.5 sec    INR 1.1 0.8 - 1.2   CPK    Collection Time: 01/01/20  3:27 PM   Result Value Ref Range    CPK 1721 (H) 20 - 180 U/L   Lactic acid, plasma    Collection Time: 01/01/20  4:27 PM   Result Value Ref Range    Lactate (Lactic Acid) 2.0 0.5 - 2.2 mmol/L   Urinalysis, Reflex to Urine Culture Urine, Clean Catch    Collection Time: 01/01/20  4:47 PM   Result Value Ref Range    Specimen UA Urine, Catheterized     Color, UA Yellow Yellow, Straw, Lianet     Appearance, UA Clear Clear    pH, UA 5.0 5.0 - 8.0    Specific Gravity, UA 1.020 1.005 - 1.030    Protein, UA Negative Negative    Glucose, UA Negative Negative    Ketones, UA 1+ (A) Negative    Bilirubin (UA) Negative Negative    Occult Blood UA Negative Negative    Nitrite, UA Negative Negative    Leukocytes, UA Negative Negative   Urinalysis Microscopic    Collection Time: 01/01/20  4:47 PM   Result Value Ref Range    RBC, UA 1 0 - 4 /hpf    WBC, UA 1 0 - 5 /hpf    Bacteria Rare None-Occ /hpf    Microscopic Comment SEE COMMENT        No results for input(s): POCTGLUCOSE in the last 168 hours.    Active Hospital Problems    Diagnosis  POA    Closed displaced intertrochanteric fracture of left femur [S72.142A]  Yes      Resolved Hospital Problems   No resolved problems to display.         Assessment and Plan:  Closed Displaced Intertrochanteric Fx of L Femur  -Ortho consulted in ED, plan for OR tomorrow  -Hip fracture pathway initiated  -For high risk of post-op delirium, minimize CNS-active meds (opiatess, benzos, anticholinergics) and sleep hygiene with windowshades open during day, no daytime naps, frequent re-orientation, private room if feasible  -For probable senile osteoporosis, check Vitamin D level.  If/for Vit D deficiency and probable senile osteopenia/osteoporosis, start Vit D and Ca, follow up in Ortho clinic per new protocol  -DVT prophylaxis for 4 weeks post-op  -PT/OT to start on POD 1  -Rodriguez to be removed on POD 2  -Pain control:  Pregabalin 75mg PO qHS, Oxy prn, and scheduled Tylenol 1g TID    Preoperative Cardiac evaluation  Cardiovascular Risk Assessment:  Non-emergent surgery.  No active cardiac problems (such as unstable angina, decompensated heart failure, significant uncontrolled arrhythmias or severe valvular disease).  Intermediate risk surgery.  Functional Status: She IS able to climb a flight of stairs (> 4 METS) with no CP or SOB  Her revised cardiac risk index is 0.     1 pt Each:  Ischemic Heart Disease, Cerebrovascular Disease,                     CHF, DM, Creatinine > 2           Recommend proceed to surgery as planned.    Fracture of Proximal End of L Humerus  -No surgical intervention planned at this time per ortho  -Sling ordered, f/u further ortho recommendations    Leukocytosis  -WBC significantly elevated at 24.52 with L shift  -No other clinical signs of infection, vitals normal and t. Asymptomatic  -Suspect 2/2 to stress from fracture, will hold off on antibiotics  -Trend while inpatient    Elevated CPK  -CPK elevated to 1721  -IV NS started at 100 cc/hr x24 hrs. Continue to trend and monitor volume status    DVT PPx: SCDs (until after surgery)    Seng Vera MD  Hospital Medicine Staff  [phone] pager

## 2020-01-03 DIAGNOSIS — S72.142D CLOSED DISPLACED INTERTROCHANTERIC FRACTURE OF LEFT FEMUR WITH ROUTINE HEALING, SUBSEQUENT ENCOUNTER: Primary | ICD-10-CM

## 2020-01-03 LAB
ANION GAP SERPL CALC-SCNC: 5 MMOL/L (ref 8–16)
BASOPHILS # BLD AUTO: 0.02 K/UL (ref 0–0.2)
BASOPHILS NFR BLD: 0.1 % (ref 0–1.9)
BUN SERPL-MCNC: 22 MG/DL (ref 10–30)
CALCIUM SERPL-MCNC: 8.1 MG/DL (ref 8.7–10.5)
CHLORIDE SERPL-SCNC: 113 MMOL/L (ref 95–110)
CO2 SERPL-SCNC: 22 MMOL/L (ref 23–29)
CREAT SERPL-MCNC: 0.9 MG/DL (ref 0.5–1.4)
DIFFERENTIAL METHOD: ABNORMAL
EOSINOPHIL # BLD AUTO: 0 K/UL (ref 0–0.5)
EOSINOPHIL NFR BLD: 0.1 % (ref 0–8)
ERYTHROCYTE [DISTWIDTH] IN BLOOD BY AUTOMATED COUNT: 16 % (ref 11.5–14.5)
EST. GFR  (AFRICAN AMERICAN): >60 ML/MIN/1.73 M^2
EST. GFR  (NON AFRICAN AMERICAN): 54.5 ML/MIN/1.73 M^2
GLUCOSE SERPL-MCNC: 90 MG/DL (ref 70–110)
HCT VFR BLD AUTO: 30.9 % (ref 37–48.5)
HGB BLD-MCNC: 9.4 G/DL (ref 12–16)
IMM GRANULOCYTES # BLD AUTO: 0.09 K/UL (ref 0–0.04)
IMM GRANULOCYTES NFR BLD AUTO: 0.6 % (ref 0–0.5)
LYMPHOCYTES # BLD AUTO: 2.7 K/UL (ref 1–4.8)
LYMPHOCYTES NFR BLD: 17.4 % (ref 18–48)
MAGNESIUM SERPL-MCNC: 2 MG/DL (ref 1.6–2.6)
MCH RBC QN AUTO: 27.6 PG (ref 27–31)
MCHC RBC AUTO-ENTMCNC: 30.4 G/DL (ref 32–36)
MCV RBC AUTO: 91 FL (ref 82–98)
MONOCYTES # BLD AUTO: 1.6 K/UL (ref 0.3–1)
MONOCYTES NFR BLD: 10.7 % (ref 4–15)
NEUTROPHILS # BLD AUTO: 10.8 K/UL (ref 1.8–7.7)
NEUTROPHILS NFR BLD: 71.1 % (ref 38–73)
NRBC BLD-RTO: 0 /100 WBC
PHOSPHATE SERPL-MCNC: 2.4 MG/DL (ref 2.7–4.5)
PLATELET # BLD AUTO: 190 K/UL (ref 150–350)
PMV BLD AUTO: 10.1 FL (ref 9.2–12.9)
POTASSIUM SERPL-SCNC: 4 MMOL/L (ref 3.5–5.1)
RBC # BLD AUTO: 3.4 M/UL (ref 4–5.4)
SODIUM SERPL-SCNC: 140 MMOL/L (ref 136–145)
WBC # BLD AUTO: 15.19 K/UL (ref 3.9–12.7)

## 2020-01-03 PROCEDURE — 63600175 PHARM REV CODE 636 W HCPCS: Performed by: STUDENT IN AN ORGANIZED HEALTH CARE EDUCATION/TRAINING PROGRAM

## 2020-01-03 PROCEDURE — 97165 OT EVAL LOW COMPLEX 30 MIN: CPT

## 2020-01-03 PROCEDURE — 80048 BASIC METABOLIC PNL TOTAL CA: CPT

## 2020-01-03 PROCEDURE — 63600175 PHARM REV CODE 636 W HCPCS: Performed by: HOSPITALIST

## 2020-01-03 PROCEDURE — 25000003 PHARM REV CODE 250: Performed by: HOSPITALIST

## 2020-01-03 PROCEDURE — 25000003 PHARM REV CODE 250: Performed by: STUDENT IN AN ORGANIZED HEALTH CARE EDUCATION/TRAINING PROGRAM

## 2020-01-03 PROCEDURE — 97530 THERAPEUTIC ACTIVITIES: CPT

## 2020-01-03 PROCEDURE — 11000001 HC ACUTE MED/SURG PRIVATE ROOM

## 2020-01-03 PROCEDURE — 36415 COLL VENOUS BLD VENIPUNCTURE: CPT

## 2020-01-03 PROCEDURE — 83735 ASSAY OF MAGNESIUM: CPT

## 2020-01-03 PROCEDURE — 86580 TB INTRADERMAL TEST: CPT | Performed by: HOSPITALIST

## 2020-01-03 PROCEDURE — 99231 SBSQ HOSP IP/OBS SF/LOW 25: CPT | Mod: ,,, | Performed by: ANESTHESIOLOGY

## 2020-01-03 PROCEDURE — 97110 THERAPEUTIC EXERCISES: CPT

## 2020-01-03 PROCEDURE — 97162 PT EVAL MOD COMPLEX 30 MIN: CPT

## 2020-01-03 PROCEDURE — 85025 COMPLETE CBC W/AUTO DIFF WBC: CPT

## 2020-01-03 PROCEDURE — 84100 ASSAY OF PHOSPHORUS: CPT

## 2020-01-03 PROCEDURE — 30200315 PPD INTRADERMAL TEST REV CODE 302: Performed by: HOSPITALIST

## 2020-01-03 PROCEDURE — 99232 SBSQ HOSP IP/OBS MODERATE 35: CPT | Mod: ,,, | Performed by: HOSPITALIST

## 2020-01-03 PROCEDURE — 99232 PR SUBSEQUENT HOSPITAL CARE,LEVL II: ICD-10-PCS | Mod: ,,, | Performed by: HOSPITALIST

## 2020-01-03 PROCEDURE — 99231 PR SUBSEQUENT HOSPITAL CARE,LEVL I: ICD-10-PCS | Mod: ,,, | Performed by: ANESTHESIOLOGY

## 2020-01-03 RX ORDER — SODIUM,POTASSIUM PHOSPHATES 280-250MG
1 POWDER IN PACKET (EA) ORAL
Status: DISPENSED | OUTPATIENT
Start: 2020-01-03 | End: 2020-01-04

## 2020-01-03 RX ADMIN — ROPIVACAINE HYDROCHLORIDE 2 ML/HR: 2 INJECTION, SOLUTION EPIDURAL; INFILTRATION at 10:01

## 2020-01-03 RX ADMIN — METHOCARBAMOL TABLETS 500 MG: 500 TABLET, COATED ORAL at 09:01

## 2020-01-03 RX ADMIN — MUPIROCIN 1 G: 20 OINTMENT TOPICAL at 10:01

## 2020-01-03 RX ADMIN — POTASSIUM & SODIUM PHOSPHATES POWDER PACK 280-160-250 MG 1 PACKET: 280-160-250 PACK at 05:01

## 2020-01-03 RX ADMIN — TUBERCULIN PURIFIED PROTEIN DERIVATIVE 5 UNITS: 5 INJECTION, SOLUTION INTRADERMAL at 03:01

## 2020-01-03 RX ADMIN — ROPIVACAINE HYDROCHLORIDE 2 ML/HR: 2 INJECTION, SOLUTION EPIDURAL; INFILTRATION at 02:01

## 2020-01-03 RX ADMIN — METHOCARBAMOL TABLETS 500 MG: 500 TABLET, COATED ORAL at 01:01

## 2020-01-03 RX ADMIN — ENOXAPARIN SODIUM 40 MG: 100 INJECTION SUBCUTANEOUS at 05:01

## 2020-01-03 RX ADMIN — CEFAZOLIN 2 G: 1 INJECTION, POWDER, FOR SOLUTION INTRAMUSCULAR; INTRAVENOUS at 03:01

## 2020-01-03 RX ADMIN — ACETAMINOPHEN 650 MG: 325 TABLET ORAL at 12:01

## 2020-01-03 RX ADMIN — ACETAMINOPHEN 650 MG: 325 TABLET ORAL at 05:01

## 2020-01-03 RX ADMIN — POLYETHYLENE GLYCOL 3350 17 G: 17 POWDER, FOR SOLUTION ORAL at 09:01

## 2020-01-03 RX ADMIN — ACETAMINOPHEN 650 MG: 325 TABLET ORAL at 06:01

## 2020-01-03 RX ADMIN — POTASSIUM & SODIUM PHOSPHATES POWDER PACK 280-160-250 MG 1 PACKET: 280-160-250 PACK at 12:01

## 2020-01-03 RX ADMIN — MUPIROCIN 1 G: 20 OINTMENT TOPICAL at 09:01

## 2020-01-03 RX ADMIN — MELATONIN 2000 UNITS: at 09:01

## 2020-01-03 RX ADMIN — ROPIVACAINE HYDROCHLORIDE 2 ML/HR: 2 INJECTION, SOLUTION EPIDURAL; INFILTRATION at 06:01

## 2020-01-03 NOTE — ASSESSMENT & PLAN NOTE
Franny Arnold is a 89 y.o. female s/p L femur CMN on 1/2/20 with L prox humerus fx.      - Weight bearing status: WBAT LLE and NWB LUE  - Pain control: Per APS  - Antibiotics: mateo op  - Hgb 9.4  - DVT Prophylaxis: lovenox , SCD's at all times when not ambulating.  - PT/OT  - Dispo: continue medical care with early mobilization

## 2020-01-03 NOTE — SUBJECTIVE & OBJECTIVE
"Principal Problem:Closed displaced intertrochanteric fracture of left femur    Principal Orthopedic Problem: Same    Interval History: Patient seen and examined at bedside.  No acute events overnight.  Pain controlled.  Ready for PT today.    Review of patient's allergies indicates:  No Known Allergies    Current Facility-Administered Medications   Medication    acetaminophen tablet 650 mg    bisacodyl suppository 10 mg    enoxaparin injection 40 mg    melatonin tablet 6 mg    methocarbamol tablet 500 mg    mupirocin 2 % ointment 1 g    ondansetron injection 4 mg    polyethylene glycol packet 17 g    promethazine (PHENERGAN) 6.25 mg in dextrose 5 % 50 mL IVPB    ropivacaine (PF) 2 mg/ml (0.2%) infusion    sodium chloride 0.9% flush 10 mL    sodium chloride 0.9% flush 10 mL    sodium chloride 0.9% flush 10 mL    vitamin D 1000 units tablet 2,000 Units     Objective:     Vital Signs (Most Recent):  Temp: 98 °F (36.7 °C) (01/03/20 0501)  Pulse: 79 (01/03/20 0501)  Resp: 16 (01/03/20 0501)  BP: (!) 107/58 (01/03/20 0501)  SpO2: 95 % (01/03/20 0501) Vital Signs (24h Range):  Temp:  [97.9 °F (36.6 °C)-98.7 °F (37.1 °C)] 98 °F (36.7 °C)  Pulse:  [] 79  Resp:  [8-20] 16  SpO2:  [90 %-100 %] 95 %  BP: ()/(55-89) 107/58     Weight: 54.4 kg (120 lb)  Height: 5' 4" (162.6 cm)  Body mass index is 20.6 kg/m².      Intake/Output Summary (Last 24 hours) at 1/3/2020 0632  Last data filed at 1/2/2020 1800  Gross per 24 hour   Intake 1800 ml   Output 920 ml   Net 880 ml       Ortho/SPM Exam    Physical Exam:  NAD, A/O x 3.  Wound c/d/i with clean dressing.  No focal motor or sensory deficits noted.  LUE:   In sling and swath with ecchymosis  NVI      Significant Labs:   CBC:   Recent Labs   Lab 01/02/20  0355 01/02/20  1321 01/03/20  0137   WBC 17.59* 23.75* 15.19*   HGB 12.1 12.0 9.4*   HCT 37.7 40.2 30.9*    217 190     All pertinent labs within the past 24 hours have been reviewed.    Significant " Imaging: I have reviewed and interpreted all pertinent imaging results/findings.

## 2020-01-03 NOTE — SUBJECTIVE & OBJECTIVE
Interval History: POD #1. Feels well. Pain controlled. Niece at bedside.     Review of Systems   Constitutional: Negative for chills and fever.   HENT: Negative.    Eyes: Negative for visual disturbance.   Respiratory: Negative for cough and shortness of breath.    Cardiovascular: Negative for chest pain and palpitations.   Gastrointestinal: Negative for abdominal pain, diarrhea, nausea and vomiting.   Genitourinary: Negative for difficulty urinating, frequency and hematuria.   Musculoskeletal:        As above   Neurological: Negative for light-headedness and headaches.   Psychiatric/Behavioral: The patient is not nervous/anxious.      Objective:     Vital Signs (Most Recent):  Temp: 98.3 °F (36.8 °C) (01/03/20 0801)  Pulse: 85 (01/03/20 0801)  Resp: 18 (01/03/20 0801)  BP: 108/68 (01/03/20 0801)  SpO2: 96 % (01/03/20 0801) Vital Signs (24h Range):  Temp:  [97.9 °F (36.6 °C)-98.5 °F (36.9 °C)] 98.3 °F (36.8 °C)  Pulse:  [] 85  Resp:  [12-20] 18  SpO2:  [90 %-100 %] 96 %  BP: ()/(56-89) 108/68     Weight: 54.4 kg (120 lb)  Body mass index is 20.6 kg/m².    Intake/Output Summary (Last 24 hours) at 1/3/2020 1127  Last data filed at 1/3/2020 0700  Gross per 24 hour   Intake 800 ml   Output 750 ml   Net 50 ml      Physical Exam   Constitutional: She is oriented to person, place, and time.   Thin elderly female lying comfortably in nad   HENT:   Head: Normocephalic and atraumatic.   Cardiovascular: Normal rate and regular rhythm.   Pulmonary/Chest: Effort normal. No respiratory distress.   Abdominal: Soft. Bowel sounds are normal. She exhibits no distension.   Musculoskeletal: She exhibits edema (dressing and mild edema of LLE).   LUE in sling   Neurological: She is alert and oriented to person, place, and time.   Vitals reviewed.      Significant Labs: All pertinent labs within the past 24 hours have been reviewed.    Significant Imaging: I have reviewed all pertinent imaging results/findings within the past  24 hours.

## 2020-01-03 NOTE — PT/OT/SLP EVAL
Occupational Therapy   Evaluation/ Treatment     Name: Franny Arnold  MRN: [id number]  Admitting Diagnosis:  Closed displaced intertrochanteric fracture of left femur 1 Day Post-Op    Recommendations:     Discharge Recommendations: nursing facility, skilled  Discharge Equipment Recommendations:  bedside commode, wheelchair, cane, quad  Barriers to discharge:  Decreased caregiver support    Assessment:     Franny Arnold is a 89 y.o. female with a medical diagnosis of Closed displaced intertrochanteric fracture of left femur.  She presents with the following performance deficits affecting function: weakness, impaired endurance, gait instability, impaired functional mobilty, impaired self care skills, impaired balance, decreased lower extremity function, pain, orthopedic precautions, impaired skin, decreased safety awareness.      OT eval complete. Pt tolerated session fairly well. Pt c/o pain w/ functional movement but remains cooperative throughout session. Pt requires max cues for (R) UE placement to facilitate scooting for bed mobility and stabilization when sitting EOB. Pt demonstrates a R lateral lean to off load weight from L hip due to pain. Pt able to tolerate one standing trial w/ Max x2 at EOB w/ no AD. While seated EOB, pt educated on ROM exercises for her L hand, wrist, and elbow w/ HEP provided; pt requires cues to redirect to task and assist for proper technique. HEP also contains pendulum exercises but these were not educated to pt due to her inability to tolerate standing and / or sitting upright. Pt continues to benefit from skilled OT to address the above listed impairments.     Rehab Prognosis: Good; patient would benefit from acute skilled OT services to address these deficits and reach maximum level of function.       Plan:     Patient to be seen daily to address the above listed problems via self-care/home management, therapeutic activities, therapeutic exercises  · Plan of Care Expires:  02/02/20  · Plan of Care Reviewed with: patient    Subjective     Chief Complaint: pain w/ movement  Patient/Family Comments/goals: to get better    Occupational Profile: No family present at time of OT eval  Living Environment: pt lives alone in [facility] w/ 1 DE; pt has a walk-in shower and uses a shower chair  Previous level of function: PTA, per pt, she was independent w/ ADLs and functional mobility   Roles and Routines: loves to play bridge   Equipment Used at Home:  shower chair  Assistance upon Discharge: pt will not have assistance upon d/c    Pain/Comfort:  · Pain Rating 1: (did not rate)  · Location - Side 1: Left  · Location - Orientation 1: generalized  · Location 1: hip  · Pain Addressed 1: Reposition, Cessation of Activity, Pre-medicate for activity, Distraction, Nurse notified  · Pain Rating Post-Intervention 1: (did not rate)    Patients cultural, spiritual, Roman Catholic conflicts given the current situation: no    Objective:     Communicated with: RN prior to session.  Patient found HOB elevated with perineural catheter, FCD, telemetry upon OT entry to room.    General Precautions: Standard, fall   Orthopedic Precautions:LLE weight bearing as tolerated, LUE non weight bearing   Braces: UE Sling     Occupational Performance:    Bed Mobility:    · Patient completed Scooting/Bridging with maximal assistance  · Patient completed Supine to Sit with maximal assistance and 2 persons  · Patient completed Sit to Supine with maximal assistance and 2 persons    Functional Mobility/Transfers:  · Patient completed Sit <> Stand Transfer with maximal assistance and of 2 persons  with  no assistive device   · Functional Mobility: Pt unable to safely attempt functional ambulation at this time. Pt tolerated static standing ~20seconds w/ Max Ax2    Activities of Daily Living:  · Therapist repositioned and adjusted pt's (L) UE sling       Physical Exam:  Upper Extremity Range of Motion:     -       Right Upper Extremity:  WFL  -       Left Upper Extremity: hand, wrist, and elbow WFL  Upper Extremity Strength:    -       Right Upper Extremity: WFL  -       Left Upper Extremity: NT   Strength:    -       Right Upper Extremity: WFL  -       Left Upper Extremity: WFL      AMPAC 6 Click ADL:  AMPAC Total Score: 9    Treatment & Education:  - OT role/POC  - Importance of OOB activity to maximize recovery   - Educated on (L) LE WBAT and (L) UE NWB  - Educated on safe hand placement for bed mobility and sit<>stand transfer   - Educated and performed (L) hand, wrist, and elbow ROM exercises - HEP provided.   Education:    Patient left HOB elevated with all lines intact, call button in reach and RN notified    GOALS:   Multidisciplinary Problems     Occupational Therapy Goals        Problem: Occupational Therapy Goal    Goal Priority Disciplines Outcome Interventions   Occupational Therapy Goal     OT, PT/OT Ongoing, Progressing    Description:  Goals to be met by: 2/2/2020     Patient will increase functional independence with ADLs by performing:    UE Dressing with Moderate Assistance.  LE Dressing with Maximum Assistance and Assistive Devices as needed.  Grooming while EOB with Minimal Assistance.  Toileting from bedside commode with Moderate Assistance for hygiene and clothing management.   Sitting at edge of bed x10 minutes with Contact Guard Assistance.  Supine to sit with Moderate Assistance.  Toilet transfer to bedside commode with Maximum Assistance.                      History:     Past Medical History:   Diagnosis Date    Dementia        Past Surgical History:   Procedure Laterality Date    APPENDECTOMY      INSERTION OF INTRAMEDULLARY ZAYNAB Left 1/2/2020    Procedure: INSERTION, INTRAMEDULLARY ZAYNAB;  Surgeon: Adithya Addison MD;  Location: [facility] OR [address] [place] [address];  Service: Orthopedics;  Laterality: Left;    TONSILLECTOMY         Time Tracking:     OT Date of Treatment: 01/03/20  OT Start Time: 0948  OT Stop Time: 1011  OT  Total Time (min): 23 min    Billable Minutes:Evaluation 13  Therapeutic Exercise 10    Neha Pace, OT  1/3/2020

## 2020-01-03 NOTE — PLAN OF CARE
Patient tolerated PT session fairly today. She was limited by L UE and LE pain. She was max A for bed mobility and standing trial.       Problem: Physical Therapy Goal  Goal: Physical Therapy Goal  Description  Goals to be met by: 20    Patient will increase functional independence with mobility by performin. Supine to sit with Stand-by Assistance  2. Sit to stand transfer with Minimal Assistance  3. Bed to chair transfer with Minimal Assistance using LRAD  4. Gait x10 feet with Minimal Assistance using LRAD  5. Lower extremity exercise program x10 reps per handout, with assistance as needed     Outcome: Ongoing, Progressing

## 2020-01-03 NOTE — ASSESSMENT & PLAN NOTE
X-ray showed: Acute proximal left humeral head/neck fracture  Sustained after a fall   In sling and swath  NWB to KATELYN

## 2020-01-03 NOTE — OP NOTE
OPERATIVE NOTE     DATE OF PROCEDURE:  01/02/2020     PREOPERATIVE DIAGNOSIS:           Left intertrochanteric hip fracture, displaced, closed, initial encounter  Left proximal humerus fracture, 2 part, minimally displaced, closed, initial encounter  Fall from standing     POSTOPERATIVE DIAGNOSIS:         Left intertrochanteric hip fracture, displaced, closed, initial encounter  Left proximal humerus fracture, 2 part, minimally displaced, closed, initial encounter  Fall from standing        PROCEDURE:              Open reduction internal fixation left intertrochanteric hip fracture with intramedullary nail  Closed management of left proximal humerus fracture without manipulation     SURGEON:       Adithya Addison MD     ASSISTANT:                 Hang Varghese MD     ANESTHESIA:              General     EBL:                  250 mL     COMPLICATIONS:  None     IMPLANTS:       Synthes TFNa  11 x 400 mm IMN  100 mm helical blade  Distal interlocking screw, 5 mm, x1     SPECIMENS:    None     INDICATIONS FOR PROCEDURE:  89-year-old female with history of dementia with fall from standing yesterday.  Fell getting out of bed. Presented to the emergency department.  Evaluated by the orthopedic team.  X-rays indicated a displaced left intertrochanteric hip fracture. She also had a minimally displaced left proximal humerus fracture.  Left proximal humerus fractures placed into a sling. We discussed operative versus non operative management of her left hip fracture.  Felt that non operative management would result in pain, immobility, and medical comorbidities associated with these.  We felt that we could improve her pain and improve her care by performing intramedullary nail for her left hip. Currently the plan for the left proximal humerus is to treat non operatively in a sling.     The risks, benefits, and alternatives to surgery were discussed with the patient and/or family.    Specific risks discussed  included, but were not limited to:  Death, decreased mobility, pain, prolonged hospitalization, damage to nearby structures, including neurovascular structures leading to loss of function or loss of limb, malrotation of limb, limb length discrepancy, hip pain, limp, bleeding, pain, numbness, tingling, weakness, compartment syndrome, malunion/nonunion, hardware failure, hardware prominence, infection, need for multiple staged procedures, prolonged antibiotics, iatrogenic fracture, heterotopic ossification, arthritis, a variety of medical complications including but not limited to heart attack, stroke, deep venous thrombosis, pulmonary embolism, prolonged hospitalization, prolonged intubation, and death.   Patient and/or family expressed an understanding and desires to proceed with surgery.   All questions were answered.  No guarantees were implied or stated.  Informed consent was obtained.     OPERATIVE PROCEDURE:  Patient was met in the preop hold area and the correct site and side of surgery being the left hip were marked and verified. Patient was brought back to operative suite.  Fracture boots were applied.  Patient was transferred over to the fracture table.  Peroneal post was applied.  All bony prominences were appropriately padded.  The ipsilateral upper extremity was well padded and draped across his chest.  We obtained preoperative imaging ensure we could obtain full AP and lateral views of the affected extremity.  Traction and internal rotation produced reduction of the fracture. Operative lower extremity was prepped and draped in normal sterile fashion. We performed time-out verifying the correct site and side of surgery being the left hip.  Preoperative antibiotics of 2 g Ancef were given.     We used fluoroscopic imaging to identify anatomic landmarks.  Incision was made proximal to greater trochanter in line with femur.  Skin was incised with scalpel.  Fascia was incised.  Hemostasis was achieved as  needed with electrocautery.  Guide pin was inserted in the center center position based on fluoroscopic AP and lateral views.  It was advanced to the lesser trochanter.       The medial calcar of the femoral neck was somewhat anteriorly and medial translated.  A percutaneous stab incision was made using fluoroscopic guidance on the anterior proximal aspect of femur overlying medial calcar.  We bluntly dissected down to the calcar.  A tonsil was utilized to reduce the fracture.  Opening Reamer with the soft tissue protector was utilized to open the intramedullary canal.  Ball-tipped guidewire was inserted into the femoral canal.  We confirmed the intramedullary location using fluoroscopy.  It was advanced to the superior pole of patella. Wire was measured.  We chose the appropriate size nail.  Preop templating indicated capacious canal. We reamed 1 time with a 12.5 mm Reamer.  There was some chatter in the isthmus.  We chose an 11 mm nail.  Nail was inserted on the insertion handle.  It was placed in the intramedullary canal.  It was placed to the appropriate depth and anteversion using fluoroscopy.       We made a separate incision on the lateral aspect of the thigh for placement the triple sleeve.  Skin was incised with a knife, fascia was incised.  Hemostasis was achieved as needed with electrocautery.  Guidewire was advanced to appropriate tip to apex and center center position on AP and lateral fluoroscopic views.  It was measured.  Lateral opening Reamer was utilized.  Helical blade was then inserted to appropriate depth confirmed on AP and lateral fluoroscopic imaging.  The set screws was tightened, then loosened 1/2 turn.  Fracture table traction was released bilaterally.  Some compression was applied through the nail jig.  Proximal insertion handle was removed. AP and lateral fluoroscopic imaging indicated satisfactory hardware placement with appropriate tip apex distance.       Next we turned our attention  to placement of a distal interlocking screw utilizing perfect [place] technique. Stab incision was made in the distal lateral aspect of the femur. A distal interlocking screw was inserted bicortically from lateral to medial.      Final fluoroscopic imaging was obtained of the entire hip and femur, AP and lateral views.  We were satisfied fracture reduction and hardware placement.     Wounds were thoroughly irrigated with saline.  The fascia closed with 0 Vicryl.  Subcutaneous tissue closed with 2 O Vicryl.  Skin closed with 3 Monocryl.  Dermabond and Aquacel dressing applied.     Prior to final closure all counts were confirmed to be correct. Patient tolerated the procedure well. He was extubated and transferred to PACU further covered     POSTOPERATIVE PLAN:  89-year-old female, dementia, fall from standing  Left intertrochanteric hip fracture  Left proximal humerus fracture     Weightbearing as tolerated, range of motion as tolerated left lower extremity  Nonweightbearing left upper extremity, sling  Range of motion as tolerated left hand wrist elbow.  Pendulums okay for shoulder.  PT consult for mobility and disposition recommendations     Antibiotics times 24 hr  Lovenox x4 weeks for DVT prophylactics     Follow-up 2 weeks postop for wound check and x-rays.     X-ray AP pelvis, left femur at subsequent follow-up visits     Follow-up 2 weeks postop, 6 weeks, 3 months, 6 months, 1 year        =====================  Adithya Addison MD  Orthopaedic Surgery

## 2020-01-03 NOTE — ASSESSMENT & PLAN NOTE
Sustained while trying to get out of bed  X-ray showed: Acute intertrochanteric fracture of the proximal left femur  S/P L femur CMN on 1/2/20  Pain management per anesthesia  WBAT to LLE  PTOT  DVT prophylaxis with lovenox   Drop in Hb to 9.4; will continue to monitor with daily labs

## 2020-01-03 NOTE — ANESTHESIA POSTPROCEDURE EVALUATION
Anesthesia Post Evaluation    Patient: Franny Arnold    Procedure(s) Performed: Procedure(s) (LRB):  INSERTION, INTRAMEDULLARY ZAYNAB (Left)    Final Anesthesia Type: general    Patient location during evaluation: PACU  Patient participation: Yes- Able to Participate  Level of consciousness: awake  Post-procedure vital signs: reviewed and stable  Pain management: adequate  Airway patency: patent    PONV status at discharge: No PONV  Anesthetic complications: no      Cardiovascular status: blood pressure returned to baseline  Respiratory status: unassisted  Hydration status: euvolemic  Follow-up not needed.          Vitals Value Taken Time   /68 1/3/2020  8:01 AM   Temp 36.8 °C (98.3 °F) 1/3/2020  8:01 AM   Pulse 85 1/3/2020  8:01 AM   Resp 18 1/3/2020  8:01 AM   SpO2 96 % 1/3/2020  8:01 AM         Event Time     Out of Recovery 01/02/2020 13:30:00          Pain/Karli Score: Pain Rating Prior to Med Admin: 4 (1/3/2020  6:04 AM)  Pain Rating Post Med Admin: 4 (1/3/2020  2:15 AM)  Karli Score: 10 (1/2/2020  2:00 PM)

## 2020-01-03 NOTE — PLAN OF CARE
OT krish complete. Pt tolerated session well. Pt's functional outcomes have been established today based on her presenting functional level.       Problem: Occupational Therapy Goal  Goal: Occupational Therapy Goal  Description  Goals to be met by: 2/2/2020     Patient will increase functional independence with ADLs by performing:    UE Dressing with Moderate Assistance.  LE Dressing with Maximum Assistance and Assistive Devices as needed.  Grooming while EOB with Minimal Assistance.  Toileting from bedside commode with Moderate Assistance for hygiene and clothing management.   Sitting at edge of bed x10 minutes with Contact Guard Assistance.  Supine to sit with Moderate Assistance.  Toilet transfer to bedside commode with Maximum Assistance.     Outcome: Ongoing, Progressing

## 2020-01-03 NOTE — PLAN OF CARE
01/03/20 1603   Post-Acute Status   Post-Acute Authorization Placement   Post-Acute Placement Status Referrals Sent     SW met with patient and patient family at the bedside to discuss SNF placement post discharge. Patient requested a referral be sent to [facility] via right [facility]. SW sent the referral via [facility] and will continue to follow up .SW will follow up for more choices.    Elham Merino LMSW  [facility]   [id number]

## 2020-01-03 NOTE — PT/OT/SLP EVAL
Physical Therapy Evaluation and Treatment     Patient Name:  Franny Arnold   MRN:  [id number]    Recommendations:     Discharge Recommendations:  nursing facility, skilled   Discharge Equipment Recommendations: bedside commode, cane, quad, wheelchair   Barriers to discharge: Decreased caregiver support    Assessment:     Franny Arnold is a 89 y.o. female admitted with a medical diagnosis of Closed displaced intertrochanteric fracture of left femur.  She presents with the following impairments/functional limitations:  weakness, impaired endurance, impaired functional mobilty, gait instability, impaired balance, decreased lower extremity function, decreased upper extremity function, impaired skin, edema, orthopedic precautions, decreased ROM, pain, impaired joint extensibility.    Patient tolerated PT session fairly today. She was limited by L UE and LE pain. She was max A for bed mobility and standing trial.     Rehab Prognosis: Good; patient would benefit from acute skilled PT services to address these deficits and reach maximum level of function.    Recent Surgery: Procedure(s) (LRB):  INSERTION, INTRAMEDULLARY ZAYNAB (Left) 1 Day Post-Op    Plan:     During this hospitalization, patient to be seen daily to address the identified rehab impairments via gait training, therapeutic activities, therapeutic exercises and progress toward the following goals:    · Plan of Care Expires:  01/17/20    Subjective     Chief Complaint: Pain in left arm and leg.   Patient/Family Comments/goals: To get back to playing bridge.   Pain/Comfort:  · Pain Rating 1: (yes but did not rate with number)  · Location - Side 1: Left  · Location - Orientation 1: generalized  · Location 1: arm(leg)  · Pain Addressed 1: Pre-medicate for activity, Reposition, Distraction, Cessation of Activity, Nurse notified    Living Environment:  Patient lives [facility] in a [facility] with 1 step to enter. She was a walk in shower with seat. Prior to admission, patients level  of function was independent. Equipment used at home: shower chair. Upon discharge, patient will have assistance from no one.    Objective:     Communicated with RN prior to session.  Patient found supine with perineural catheter, FCD, telemetry  upon PT entry to room.    General Precautions: Standard, fall   Orthopedic Precautions:LUE non weight bearing, LLE weight bearing as tolerated   Braces: Sling and swathe     Exams:  · Cognitive Exam:  Patient is oriented to Person, Place and Situation  · RLE ROM: WFL  · RLE Strength: WFL  · LLE ROM: limited due to pain  · LLE Strength: limited due to pain    Functional Mobility:  · Bed Mobility:     · Scooting: maximal assistance  · Supine to Sit: maximal assistance of 2 persons  · Sit to Supine: maximal assistance of 2 persons  · Transfers:     · Sit to Stand:  maximal assistance of 2 persons with hand-held assist   · Patient only able to maintain upright posture ~20 seconds due to L LE pain.   · Gait: unable to attempt steps       Therapeutic Activities and Exercises:  Patient tolerated sitting on edge of bed ~15 minutes with mod A due to right lateral lean because of left hip pain in sitting.    Patient educated in:  -PT role and POC  -safety with transfers including hand placement  -EOB activity to maximize recovery       AM-PAC 6 CLICK MOBILITY  Total Score:9     Patient left supine with all lines intact, call button in reach and RN notified.    GOALS:   Multidisciplinary Problems     Physical Therapy Goals        Problem: Physical Therapy Goal    Goal Priority Disciplines Outcome Goal Variances Interventions   Physical Therapy Goal     PT, PT/OT Ongoing, Progressing     Description:  Goals to be met by: 20    Patient will increase functional independence with mobility by performin. Supine to sit with Stand-by Assistance  2. Sit to stand transfer with Minimal Assistance  3. Bed to chair transfer with Minimal Assistance using LRAD  4. Gait x10 feet with  Minimal Assistance using LRAD  5. Lower extremity exercise program x10 reps per handout, with assistance as needed                      History:     Past Medical History:   Diagnosis Date    Dementia        Past Surgical History:   Procedure Laterality Date    APPENDECTOMY      INSERTION OF INTRAMEDULLARY ZAYNAB Left 1/2/2020    Procedure: INSERTION, INTRAMEDULLARY ZAYNAB;  Surgeon: Adithya Addison MD;  Location: [facility] OR [address] [place] [address];  Service: Orthopedics;  Laterality: Left;    TONSILLECTOMY         Time Tracking:     PT Received On: 01/03/20  PT Start Time: 0949     PT Stop Time: 1013  PT Total Time (min): 24 min     Billable Minutes: Evaluation 14 and Therapeutic Activity 10       Paulina Oscar, PT  01/03/2020

## 2020-01-03 NOTE — ASSESSMENT & PLAN NOTE
Unclear etiology  UA not suggestive of UTI  CXR WNL   Afebrile   Will continue to monitor with daily CBCs  Trending down  Most likely stress response

## 2020-01-03 NOTE — ANESTHESIA POST-OP PAIN MANAGEMENT
Acute Pain Service Progress Note    Franny Arnold is a 89 y.o., female, [id number].    Surgery:  INSERTION, INTRAMEDULLARY ZAYNAB (Left Leg Upper)    Post Op Day #: 1    Catheter type: perineural  SIFI     Infusion type: Ropivacaine 0.2%  2 mL/hr basal w/ 10 mL/hr IB    Problem List:    Active Hospital Problems    Diagnosis  POA    *Closed displaced intertrochanteric fracture of left femur [S72.142A]  Yes    Closed displaced fracture of surgical neck of left humerus [S42.212A]  Yes    Fracture of proximal end of left humerus [S42.202A]  Yes    Leukocytosis [D72.829]  Yes      Resolved Hospital Problems   No resolved problems to display.       Subjective:     General appearance of alert, oriented, no complaints   Pain with rest: 1    Numbers   Pain with movement: 3    Numbers   Side Effects    1. Pruritis No    2. Nausea No    3. Motor Blockade No, 0=Ability to raise lower extremities off bed    4. Sedation No, 1=awake and alert    Objective:     Catheter site clean, dry, intact      Vitals   Vitals:    01/03/20 0801   BP: 108/68   Pulse: 85   Resp: 18   Temp: 36.8 °C (98.3 °F)        Labs    Admission on 01/01/2020   Component Date Value Ref Range Status    WBC 01/01/2020 24.52* 3.90 - 12.70 K/uL Final    RBC 01/01/2020 5.26  4.00 - 5.40 M/uL Final    Hemoglobin 01/01/2020 14.5  12.0 - 16.0 g/dL Final    Hematocrit 01/01/2020 46.0  37.0 - 48.5 % Final    Mean Corpuscular Volume 01/01/2020 88  82 - 98 fL Final    Mean Corpuscular Hemoglobin 01/01/2020 27.6  27.0 - 31.0 pg Final    Mean Corpuscular Hemoglobin Conc 01/01/2020 31.5* 32.0 - 36.0 g/dL Final    RDW 01/01/2020 15.1* 11.5 - 14.5 % Final    Platelets 01/01/2020 258  150 - 350 K/uL Final    MPV 01/01/2020 9.6  9.2 - 12.9 fL Final    Immature Granulocytes 01/01/2020 0.7* 0.0 - 0.5 % Final    Gran # (ANC) 01/01/2020 21.6* 1.8 - 7.7 K/uL Final    Immature Grans (Abs) 01/01/2020 0.18* 0.00 - 0.04 K/uL Final    Lymph # 01/01/2020 0.9* 1.0 - 4.8 K/uL  Final    Mono # 01/01/2020 1.8* 0.3 - 1.0 K/uL Final    Eos # 01/01/2020 0.0  0.0 - 0.5 K/uL Final    Baso # 01/01/2020 0.03  0.00 - 0.20 K/uL Final    nRBC 01/01/2020 0  0 /100 WBC Final    Gran% 01/01/2020 87.9* 38.0 - 73.0 % Final    Lymph% 01/01/2020 3.8* 18.0 - 48.0 % Final    Mono% 01/01/2020 7.5  4.0 - 15.0 % Final    Eosinophil% 01/01/2020 0.0  0.0 - 8.0 % Final    Basophil% 01/01/2020 0.1  0.0 - 1.9 % Final    Differential Method 01/01/2020 Automated   Final    Sodium 01/01/2020 140  136 - 145 mmol/L Final    Potassium 01/01/2020 4.5  3.5 - 5.1 mmol/L Final    Chloride 01/01/2020 105  95 - 110 mmol/L Final    CO2 01/01/2020 24  23 - 29 mmol/L Final    Glucose 01/01/2020 138* 70 - 110 mg/dL Final    BUN, Bld 01/01/2020 26  10 - 30 mg/dL Final    Creatinine 01/01/2020 0.9  0.5 - 1.4 mg/dL Final    Calcium 01/01/2020 9.9  8.7 - 10.5 mg/dL Final    Total Protein 01/01/2020 7.4  6.0 - 8.4 g/dL Final    Albumin 01/01/2020 4.1  3.5 - 5.2 g/dL Final    Total Bilirubin 01/01/2020 1.6* 0.1 - 1.0 mg/dL Final    Alkaline Phosphatase 01/01/2020 65  55 - 135 U/L Final    AST 01/01/2020 48* 10 - 40 U/L Final    ALT 01/01/2020 26  10 - 44 U/L Final    Anion Gap 01/01/2020 11  8 - 16 mmol/L Final    eGFR if African American 01/01/2020 >60.0  >60 mL/min/1.73 m^2 Final    eGFR if non African American 01/01/2020 54.5* >60 mL/min/1.73 m^2 Final    Troponin I 01/01/2020 0.017  0.000 - 0.026 ng/mL Final    Prothrombin Time 01/01/2020 10.9  9.0 - 12.5 sec Final    INR 01/01/2020 1.1  0.8 - 1.2 Final    Specimen UA 01/01/2020 Urine, Catheterized   Final    Color, UA 01/01/2020 Yellow  Yellow, Straw, Lianet Final    Appearance, UA 01/01/2020 Clear  Clear Final    pH, UA 01/01/2020 5.0  5.0 - 8.0 Final    Specific [place], UA 01/01/2020 1.020  1.005 - 1.030 Final    Protein, UA 01/01/2020 Negative  Negative Final    Glucose, UA 01/01/2020 Negative  Negative Final    Ketones, UA 01/01/2020 1+*  Negative Final    Bilirubin (UA) 01/01/2020 Negative  Negative Final    Occult Blood UA 01/01/2020 Negative  Negative Final    Nitrite, UA 01/01/2020 Negative  Negative Final    Leukocytes, UA 01/01/2020 Negative  Negative Final    CPK 01/01/2020 1721* 20 - 180 U/L Final    Lactate (Lactic Acid) 01/01/2020 2.0  0.5 - 2.2 mmol/L Final    RBC, UA 01/01/2020 1  0 - 4 /hpf Final    WBC, UA 01/01/2020 1  0 - 5 /hpf Final    Bacteria 01/01/2020 Rare  None-Occ /hpf Final    Microscopic Comment 01/01/2020 SEE COMMENT   Final    Group & Rh 01/01/2020 A NEG   Final    Indirect Dori 01/01/2020 NEG   Final    WBC 01/02/2020 17.59* 3.90 - 12.70 K/uL Final    RBC 01/02/2020 4.30  4.00 - 5.40 M/uL Final    Hemoglobin 01/02/2020 12.1  12.0 - 16.0 g/dL Final    Hematocrit 01/02/2020 37.7  37.0 - 48.5 % Final    Mean Corpuscular Volume 01/02/2020 88  82 - 98 fL Final    Mean Corpuscular Hemoglobin 01/02/2020 28.1  27.0 - 31.0 pg Final    Mean Corpuscular Hemoglobin Conc 01/02/2020 32.1  32.0 - 36.0 g/dL Final    RDW 01/02/2020 15.7* 11.5 - 14.5 % Final    Platelets 01/02/2020 218  150 - 350 K/uL Final    MPV 01/02/2020 9.8  9.2 - 12.9 fL Final    Immature Granulocytes 01/02/2020 0.5  0.0 - 0.5 % Final    Gran # (ANC) 01/02/2020 13.6* 1.8 - 7.7 K/uL Final    Immature Grans (Abs) 01/02/2020 0.09* 0.00 - 0.04 K/uL Final    Lymph # 01/02/2020 2.3  1.0 - 4.8 K/uL Final    Mono # 01/02/2020 1.6* 0.3 - 1.0 K/uL Final    Eos # 01/02/2020 0.0  0.0 - 0.5 K/uL Final    Baso # 01/02/2020 0.02  0.00 - 0.20 K/uL Final    nRBC 01/02/2020 0  0 /100 WBC Final    Gran% 01/02/2020 77.0* 38.0 - 73.0 % Final    Lymph% 01/02/2020 13.1* 18.0 - 48.0 % Final    Mono% 01/02/2020 9.2  4.0 - 15.0 % Final    Eosinophil% 01/02/2020 0.1  0.0 - 8.0 % Final    Basophil% 01/02/2020 0.1  0.0 - 1.9 % Final    Differential Method 01/02/2020 Automated   Final    Sodium 01/02/2020 142  136 - 145 mmol/L Final    Potassium 01/02/2020  3.9  3.5 - 5.1 mmol/L Final    Chloride 01/02/2020 112* 95 - 110 mmol/L Final    CO2 01/02/2020 22* 23 - 29 mmol/L Final    Glucose 01/02/2020 97  70 - 110 mg/dL Final    BUN, Bld 01/02/2020 19  10 - 30 mg/dL Final    Creatinine 01/02/2020 0.8  0.5 - 1.4 mg/dL Final    Calcium 01/02/2020 8.5* 8.7 - 10.5 mg/dL Final    Anion Gap 01/02/2020 8  8 - 16 mmol/L Final    eGFR if African American 01/02/2020 >60.0  >60 mL/min/1.73 m^2 Final    eGFR if non African American 01/02/2020 >60.0  >60 mL/min/1.73 m^2 Final    Magnesium 01/02/2020 2.0  1.6 - 2.6 mg/dL Final    Phosphorus 01/02/2020 2.9  2.7 - 4.5 mg/dL Final    CPK 01/02/2020 853* 20 - 180 U/L Final    POCT Glucose 01/01/2020 127* 70 - 110 mg/dL Final    WBC 01/02/2020 23.75* 3.90 - 12.70 K/uL Final    RBC 01/02/2020 4.33  4.00 - 5.40 M/uL Final    Hemoglobin 01/02/2020 12.0  12.0 - 16.0 g/dL Final    Hematocrit 01/02/2020 40.2  37.0 - 48.5 % Final    Mean Corpuscular Volume 01/02/2020 93  82 - 98 fL Final    Mean Corpuscular Hemoglobin 01/02/2020 27.7  27.0 - 31.0 pg Final    Mean Corpuscular Hemoglobin Conc 01/02/2020 29.9* 32.0 - 36.0 g/dL Final    RDW 01/02/2020 15.9* 11.5 - 14.5 % Final    Platelets 01/02/2020 217  150 - 350 K/uL Final    MPV 01/02/2020 9.8  9.2 - 12.9 fL Final    Immature Granulocytes 01/02/2020 1.1* 0.0 - 0.5 % Final    Gran # (ANC) 01/02/2020 19.8* 1.8 - 7.7 K/uL Final    Immature Grans (Abs) 01/02/2020 0.25* 0.00 - 0.04 K/uL Final    Lymph # 01/02/2020 1.7  1.0 - 4.8 K/uL Final    Mono # 01/02/2020 1.9* 0.3 - 1.0 K/uL Final    Eos # 01/02/2020 0.0  0.0 - 0.5 K/uL Final    Baso # 01/02/2020 0.05  0.00 - 0.20 K/uL Final    nRBC 01/02/2020 0  0 /100 WBC Final    Gran% 01/02/2020 83.6* 38.0 - 73.0 % Final    Lymph% 01/02/2020 7.3* 18.0 - 48.0 % Final    Mono% 01/02/2020 7.8  4.0 - 15.0 % Final    Eosinophil% 01/02/2020 0.0  0.0 - 8.0 % Final    Basophil% 01/02/2020 0.2  0.0 - 1.9 % Final    Differential  Method 01/02/2020 Automated   Final    WBC 01/03/2020 15.19* 3.90 - 12.70 K/uL Final    RBC 01/03/2020 3.40* 4.00 - 5.40 M/uL Final    Hemoglobin 01/03/2020 9.4* 12.0 - 16.0 g/dL Final    Hematocrit 01/03/2020 30.9* 37.0 - 48.5 % Final    Mean Corpuscular Volume 01/03/2020 91  82 - 98 fL Final    Mean Corpuscular Hemoglobin 01/03/2020 27.6  27.0 - 31.0 pg Final    Mean Corpuscular Hemoglobin Conc 01/03/2020 30.4* 32.0 - 36.0 g/dL Final    RDW 01/03/2020 16.0* 11.5 - 14.5 % Final    Platelets 01/03/2020 190  150 - 350 K/uL Final    MPV 01/03/2020 10.1  9.2 - 12.9 fL Final    Immature Granulocytes 01/03/2020 0.6* 0.0 - 0.5 % Final    Gran # (ANC) 01/03/2020 10.8* 1.8 - 7.7 K/uL Final    Immature Grans (Abs) 01/03/2020 0.09* 0.00 - 0.04 K/uL Final    Lymph # 01/03/2020 2.7  1.0 - 4.8 K/uL Final    Mono # 01/03/2020 1.6* 0.3 - 1.0 K/uL Final    Eos # 01/03/2020 0.0  0.0 - 0.5 K/uL Final    Baso # 01/03/2020 0.02  0.00 - 0.20 K/uL Final    nRBC 01/03/2020 0  0 /100 WBC Final    Gran% 01/03/2020 71.1  38.0 - 73.0 % Final    Lymph% 01/03/2020 17.4* 18.0 - 48.0 % Final    Mono% 01/03/2020 10.7  4.0 - 15.0 % Final    Eosinophil% 01/03/2020 0.1  0.0 - 8.0 % Final    Basophil% 01/03/2020 0.1  0.0 - 1.9 % Final    Differential Method 01/03/2020 Automated   Final    Sodium 01/03/2020 140  136 - 145 mmol/L Final    Potassium 01/03/2020 4.0  3.5 - 5.1 mmol/L Final    Chloride 01/03/2020 113* 95 - 110 mmol/L Final    CO2 01/03/2020 22* 23 - 29 mmol/L Final    Glucose 01/03/2020 90  70 - 110 mg/dL Final    BUN, Bld 01/03/2020 22  10 - 30 mg/dL Final    Creatinine 01/03/2020 0.9  0.5 - 1.4 mg/dL Final    Calcium 01/03/2020 8.1* 8.7 - 10.5 mg/dL Final    Anion Gap 01/03/2020 5* 8 - 16 mmol/L Final    eGFR if African American 01/03/2020 >60.0  >60 mL/min/1.73 m^2 Final    eGFR if non African American 01/03/2020 54.5* >60 mL/min/1.73 m^2 Final    Magnesium 01/03/2020 2.0  1.6 - 2.6 mg/dL Final     Phosphorus 01/03/2020 2.4* 2.7 - 4.5 mg/dL Final        Meds   Current Facility-Administered Medications   Medication Dose Route Frequency Provider Last Rate Last Dose    acetaminophen tablet 650 mg  650 mg Oral Q6H Kip Burnett MD   650 mg at 01/03/20 0605    bisacodyl suppository 10 mg  10 mg Rectal Daily PRN Seng Vera MD        enoxaparin injection 40 mg  40 mg Subcutaneous Daily Hang Varghese MD   40 mg at 01/02/20 1814    melatonin tablet 6 mg  6 mg Oral Nightly PRN Seng Vera MD   6 mg at 01/02/20 2143    methocarbamol tablet 500 mg  500 mg Oral QID Kip Burnett MD   500 mg at 01/02/20 2143    mupirocin 2 % ointment 1 g  1 g Nasal BID Seng Vera MD   1 g at 01/02/20 2143    ondansetron injection 4 mg  4 mg Intravenous Q12H PRN Kip Burnett MD        polyethylene glycol packet 17 g  17 g Oral Daily Segn Vera MD   17 g at 01/02/20 1521    promethazine (PHENERGAN) 6.25 mg in dextrose 5 % 50 mL IVPB  6.25 mg Intravenous Q6H PRN Kip Burnett MD        ropivacaine (PF) 2 mg/ml (0.2%) infusion  2 mL/hr Perineural Continuous Kip Burnett MD 2 mL/hr at 01/03/20 0604 2 mL/hr at 01/03/20 0604    sodium chloride 0.9% flush 10 mL  10 mL Intravenous PRN Alejandro Maloney DO        sodium chloride 0.9% flush 10 mL  10 mL Intravenous PRN Seng Vera MD        sodium chloride 0.9% flush 10 mL  10 mL Intravenous PRN Elias Rolon MD        vitamin D 1000 units tablet 2,000 Units  2,000 Units Oral Daily Hang Varghese MD   2,000 Units at 01/02/20 1521        Anticoagulant dose Lovenox 40mg SubQ daily.    Assessment:                 Pain control adequate.     Plan:     - Patient doing well, continue present treatment.   - Continue PNC at current rate.  - Continue multimodal analgesia with Tylenol and Robaxin.  - Encouraged PT/OT and mobility OOB.  - Dispo: per primary team     Kip Burnett MD  PGY-2 / CA-1      I have seen the patient,  reviewed the Resident's assessment and plan. I have personally interviewed and examined the patient at bedside and: agree with the findings.

## 2020-01-03 NOTE — PLAN OF CARE
Patient lives at Home alone. Therapy recommended Skilled nursing care. SW to provide choices to patient and family. Will continue to follow for discharge needs     01/03/20 6870   Discharge Assessment   Assessment Type Discharge Planning Assessment   Confirmed/corrected address and phone number on facesheet? Yes   Assessment information obtained from? Patient   Expected Length of Stay (days)   (3)   Communicated expected length of stay with patient/caregiver yes   Prior to hospitilization cognitive status: Alert/Oriented   Prior to hospitalization functional status: Needs Assistance   Current cognitive status: Alert/Oriented   Current Functional Status: Partially Dependent   Facility Arrived From: Home   Lives With alone   Able to Return to Prior Arrangements no   Is patient able to care for self after discharge? No   Who are your caregiver(s) and their phone number(s)?   (Chela Michaud ([facility]) [phone])   Patient's perception of discharge disposition home or selfcare   Readmission Within the Last 30 Days no previous admission in last 30 days   Patient currently being followed by outpatient case management? No   Patient currently receives any other outside agency services? No   Equipment Currently Used at Home shower chair   Do you have any problems affording any of your prescribed medications? No   Is the patient taking medications as prescribed? yes   Does the patient have transportation home? Yes   Transportation Anticipated family or friend will provide   Does the patient receive services at the [facility]? No   Discharge Plan A Skilled Nursing Facility   Discharge Plan B Skilled Nursing Facility   DME Needed Upon Discharge  none   Patient/Family in Agreement with Plan yes

## 2020-01-03 NOTE — PROGRESS NOTES
[facility] Hospital Medicine  Progress Note    Patient Name: Franny Arnold  MRN: [id number]  Patient Class: IP- Inpatient   Admission Date: 1/1/2020  Length of Stay: 2 days  Attending Physician: Kamini Collado MD  Primary Care Provider: Dinesh Wylie MD    [facility] Medicine Team: [facility]  Kamini Collado MD    Subjective:     Principal Problem:Closed displaced intertrochanteric fracture of left femur        HPI:  No notes on file    Overview/Hospital Course:  No notes on file    Interval History: POD #1. Feels well. Pain controlled. Niece at bedside.     Review of Systems   Constitutional: Negative for chills and fever.   HENT: Negative.    Eyes: Negative for visual disturbance.   Respiratory: Negative for cough and shortness of breath.    Cardiovascular: Negative for chest pain and palpitations.   Gastrointestinal: Negative for abdominal pain, diarrhea, nausea and vomiting.   Genitourinary: Negative for difficulty urinating, frequency and hematuria.   Musculoskeletal:        As above   Neurological: Negative for light-headedness and headaches.   Psychiatric/Behavioral: The patient is not nervous/anxious.      Objective:     Vital Signs (Most Recent):  Temp: 98.3 °F (36.8 °C) (01/03/20 0801)  Pulse: 85 (01/03/20 0801)  Resp: 18 (01/03/20 0801)  BP: 108/68 (01/03/20 0801)  SpO2: 96 % (01/03/20 0801) Vital Signs (24h Range):  Temp:  [97.9 °F (36.6 °C)-98.5 °F (36.9 °C)] 98.3 °F (36.8 °C)  Pulse:  [] 85  Resp:  [12-20] 18  SpO2:  [90 %-100 %] 96 %  BP: ()/(56-89) 108/68     Weight: 54.4 kg (120 lb)  Body mass index is 20.6 kg/m².    Intake/Output Summary (Last 24 hours) at 1/3/2020 1127  Last data filed at 1/3/2020 0700  Gross per 24 hour   Intake 800 ml   Output 750 ml   Net 50 ml      Physical Exam   Constitutional: She is oriented to person, place, and time.   Thin elderly female lying comfortably in nad   HENT:   Head: Normocephalic and atraumatic.   Cardiovascular: Normal  rate and regular rhythm.   Pulmonary/Chest: Effort normal. No respiratory distress.   Abdominal: Soft. Bowel sounds are normal. She exhibits no distension.   Musculoskeletal: She exhibits edema (dressing and mild edema of LLE).   LUE in sling   Neurological: She is alert and oriented to person, place, and time.   Vitals reviewed.      Significant Labs: All pertinent labs within the past 24 hours have been reviewed.    Significant Imaging: I have reviewed all pertinent imaging results/findings within the past 24 hours.      Assessment/Plan:      * Closed displaced intertrochanteric fracture of left femur  Sustained while trying to get out of bed  X-ray showed: Acute intertrochanteric fracture of the proximal left femur  S/P L femur CMN on 1/2/20  Pain management per anesthesia  WBAT to LLE  PTOT  DVT prophylaxis with lovenox   Drop in Hb to 9.4; will continue to monitor with daily labs      Leukocytosis  Unclear etiology  UA not suggestive of UTI  CXR WNL   Afebrile   Will continue to monitor with daily CBCs  Trending down  Most likely stress response     Fracture of proximal end of left humerus  X-ray showed: Acute proximal left humeral head/neck fracture  Sustained after a fall   In sling and swath  NWB to LUE         VTE Risk Mitigation (From admission, onward)         Ordered     enoxaparin injection 40 mg  Daily      01/02/20 1259     IP VTE HIGH RISK PATIENT  Once      01/02/20 1253     Place sequential compression device  Until discontinued      01/02/20 0749     Place ADAIR hose  Until discontinued      01/02/20 0749     Place sequential compression device  Until discontinued      01/01/20 1815                      Kamini Collado MD  Department of Hospital Medicine   [facility]

## 2020-01-04 PROBLEM — R41.0 CONFUSION: Status: ACTIVE | Noted: 2020-01-04

## 2020-01-04 LAB
ANION GAP SERPL CALC-SCNC: 7 MMOL/L (ref 8–16)
BASOPHILS # BLD AUTO: 0.03 K/UL (ref 0–0.2)
BASOPHILS NFR BLD: 0.3 % (ref 0–1.9)
BUN SERPL-MCNC: 15 MG/DL (ref 10–30)
CALCIUM SERPL-MCNC: 8.2 MG/DL (ref 8.7–10.5)
CHLORIDE SERPL-SCNC: 111 MMOL/L (ref 95–110)
CO2 SERPL-SCNC: 25 MMOL/L (ref 23–29)
CREAT SERPL-MCNC: 0.8 MG/DL (ref 0.5–1.4)
DIFFERENTIAL METHOD: ABNORMAL
EOSINOPHIL # BLD AUTO: 0.1 K/UL (ref 0–0.5)
EOSINOPHIL NFR BLD: 0.7 % (ref 0–8)
ERYTHROCYTE [DISTWIDTH] IN BLOOD BY AUTOMATED COUNT: 15.9 % (ref 11.5–14.5)
EST. GFR  (AFRICAN AMERICAN): >60 ML/MIN/1.73 M^2
EST. GFR  (NON AFRICAN AMERICAN): >60 ML/MIN/1.73 M^2
GLUCOSE SERPL-MCNC: 82 MG/DL (ref 70–110)
HCT VFR BLD AUTO: 28.8 % (ref 37–48.5)
HGB BLD-MCNC: 8.5 G/DL (ref 12–16)
IMM GRANULOCYTES # BLD AUTO: 0.09 K/UL (ref 0–0.04)
IMM GRANULOCYTES NFR BLD AUTO: 0.8 % (ref 0–0.5)
LYMPHOCYTES # BLD AUTO: 2.2 K/UL (ref 1–4.8)
LYMPHOCYTES NFR BLD: 18.7 % (ref 18–48)
MAGNESIUM SERPL-MCNC: 2 MG/DL (ref 1.6–2.6)
MCH RBC QN AUTO: 27.1 PG (ref 27–31)
MCHC RBC AUTO-ENTMCNC: 29.5 G/DL (ref 32–36)
MCV RBC AUTO: 92 FL (ref 82–98)
MONOCYTES # BLD AUTO: 1 K/UL (ref 0.3–1)
MONOCYTES NFR BLD: 8.5 % (ref 4–15)
NEUTROPHILS # BLD AUTO: 8.2 K/UL (ref 1.8–7.7)
NEUTROPHILS NFR BLD: 71 % (ref 38–73)
NRBC BLD-RTO: 0 /100 WBC
PHOSPHATE SERPL-MCNC: 2.3 MG/DL (ref 2.7–4.5)
PLATELET # BLD AUTO: 191 K/UL (ref 150–350)
PMV BLD AUTO: 10.5 FL (ref 9.2–12.9)
POCT GLUCOSE: 97 MG/DL (ref 70–110)
POTASSIUM SERPL-SCNC: 3.9 MMOL/L (ref 3.5–5.1)
RBC # BLD AUTO: 3.14 M/UL (ref 4–5.4)
SODIUM SERPL-SCNC: 143 MMOL/L (ref 136–145)
WBC # BLD AUTO: 11.48 K/UL (ref 3.9–12.7)

## 2020-01-04 PROCEDURE — 97530 THERAPEUTIC ACTIVITIES: CPT

## 2020-01-04 PROCEDURE — 25000003 PHARM REV CODE 250: Performed by: STUDENT IN AN ORGANIZED HEALTH CARE EDUCATION/TRAINING PROGRAM

## 2020-01-04 PROCEDURE — 25000003 PHARM REV CODE 250: Performed by: PHYSICIAN ASSISTANT

## 2020-01-04 PROCEDURE — 94761 N-INVAS EAR/PLS OXIMETRY MLT: CPT

## 2020-01-04 PROCEDURE — 25000003 PHARM REV CODE 250: Performed by: HOSPITALIST

## 2020-01-04 PROCEDURE — 84100 ASSAY OF PHOSPHORUS: CPT

## 2020-01-04 PROCEDURE — 11000001 HC ACUTE MED/SURG PRIVATE ROOM

## 2020-01-04 PROCEDURE — 97112 NEUROMUSCULAR REEDUCATION: CPT

## 2020-01-04 PROCEDURE — 80048 BASIC METABOLIC PNL TOTAL CA: CPT

## 2020-01-04 PROCEDURE — 99232 PR SUBSEQUENT HOSPITAL CARE,LEVL II: ICD-10-PCS | Mod: ,,, | Performed by: HOSPITALIST

## 2020-01-04 PROCEDURE — 99232 SBSQ HOSP IP/OBS MODERATE 35: CPT | Mod: ,,, | Performed by: HOSPITALIST

## 2020-01-04 PROCEDURE — 83735 ASSAY OF MAGNESIUM: CPT

## 2020-01-04 PROCEDURE — 63600175 PHARM REV CODE 636 W HCPCS: Performed by: STUDENT IN AN ORGANIZED HEALTH CARE EDUCATION/TRAINING PROGRAM

## 2020-01-04 PROCEDURE — 36415 COLL VENOUS BLD VENIPUNCTURE: CPT

## 2020-01-04 PROCEDURE — 85025 COMPLETE CBC W/AUTO DIFF WBC: CPT

## 2020-01-04 PROCEDURE — 97535 SELF CARE MNGMENT TRAINING: CPT

## 2020-01-04 RX ORDER — OLANZAPINE 2.5 MG/1
2.5 TABLET ORAL ONCE AS NEEDED
Status: COMPLETED | OUTPATIENT
Start: 2020-01-04 | End: 2020-01-04

## 2020-01-04 RX ORDER — SODIUM,POTASSIUM PHOSPHATES 280-250MG
1 POWDER IN PACKET (EA) ORAL
Status: COMPLETED | OUTPATIENT
Start: 2020-01-04 | End: 2020-01-05

## 2020-01-04 RX ADMIN — METHOCARBAMOL TABLETS 500 MG: 500 TABLET, COATED ORAL at 09:01

## 2020-01-04 RX ADMIN — METHOCARBAMOL TABLETS 500 MG: 500 TABLET, COATED ORAL at 05:01

## 2020-01-04 RX ADMIN — POLYETHYLENE GLYCOL 3350 17 G: 17 POWDER, FOR SOLUTION ORAL at 09:01

## 2020-01-04 RX ADMIN — METHOCARBAMOL TABLETS 500 MG: 500 TABLET, COATED ORAL at 10:01

## 2020-01-04 RX ADMIN — ACETAMINOPHEN 650 MG: 325 TABLET ORAL at 01:01

## 2020-01-04 RX ADMIN — ENOXAPARIN SODIUM 40 MG: 100 INJECTION SUBCUTANEOUS at 05:01

## 2020-01-04 RX ADMIN — MUPIROCIN 1 G: 20 OINTMENT TOPICAL at 09:01

## 2020-01-04 RX ADMIN — ROPIVACAINE HYDROCHLORIDE 2 ML/HR: 2 INJECTION, SOLUTION EPIDURAL; INFILTRATION at 07:01

## 2020-01-04 RX ADMIN — OLANZAPINE 2.5 MG: 2.5 TABLET, FILM COATED ORAL at 01:01

## 2020-01-04 RX ADMIN — ACETAMINOPHEN 650 MG: 325 TABLET ORAL at 12:01

## 2020-01-04 RX ADMIN — MUPIROCIN 1 G: 20 OINTMENT TOPICAL at 10:01

## 2020-01-04 RX ADMIN — ROPIVACAINE HYDROCHLORIDE 2 ML/HR: 2 INJECTION, SOLUTION EPIDURAL; INFILTRATION at 03:01

## 2020-01-04 RX ADMIN — MELATONIN 2000 UNITS: at 09:01

## 2020-01-04 RX ADMIN — POTASSIUM & SODIUM PHOSPHATES POWDER PACK 280-160-250 MG 1 PACKET: 280-160-250 PACK at 01:01

## 2020-01-04 RX ADMIN — POTASSIUM & SODIUM PHOSPHATES POWDER PACK 280-160-250 MG 1 PACKET: 280-160-250 PACK at 05:01

## 2020-01-04 RX ADMIN — Medication 6 MG: at 12:01

## 2020-01-04 RX ADMIN — METHOCARBAMOL TABLETS 500 MG: 500 TABLET, COATED ORAL at 01:01

## 2020-01-04 RX ADMIN — POTASSIUM & SODIUM PHOSPHATES POWDER PACK 280-160-250 MG 1 PACKET: 280-160-250 PACK at 10:01

## 2020-01-04 RX ADMIN — POTASSIUM & SODIUM PHOSPHATES POWDER PACK 280-160-250 MG 1 PACKET: 280-160-250 PACK at 09:01

## 2020-01-04 NOTE — ANESTHESIA POST-OP PAIN MANAGEMENT
Acute Pain Service Progress Note    Franny Arnlod is a 89 y.o., female, [id number].    Surgery:  INSERTION, INTRAMEDULLARY ZAYNAB (Left Leg Upper)     Post Op Day #: 2     Catheter type: perineural  SIFI     Infusion type: Ropivacaine 0.2%  2 mL/hr basal w/ 10 mL/hr IB    Problem List:    Active Hospital Problems    Diagnosis  POA    *Closed displaced intertrochanteric fracture of left femur [S72.142A]  Yes    Closed displaced fracture of surgical neck of left humerus [S42.212A]  Yes    Fracture of proximal end of left humerus [S42.202A]  Yes    Leukocytosis [D72.829]  Yes      Resolved Hospital Problems   No resolved problems to display.            General appearance of alert, oriented, no complaints              Pain with rest: 1    Numbers              Pain with movement: 3    Numbers              Side Effects                          1. Pruritis No                          2. Nausea No                          3. Motor Blockade No, 0=Ability to raise lower extremities off bed                          4. Sedation No, 1=awake and alert     Objective:                 Catheter site clean, dry, intact      Vitals   Vitals:    01/04/20 0721   BP:    Pulse: 77   Resp:    Temp:         Labs    No results displayed because visit has over 200 results.           Meds   Current Facility-Administered Medications   Medication Dose Route Frequency Provider Last Rate Last Dose    acetaminophen tablet 650 mg  650 mg Oral Q6H Kip Burnett MD   650 mg at 01/04/20 0048    bisacodyl suppository 10 mg  10 mg Rectal Daily PRN Seng Vera MD        enoxaparin injection 40 mg  40 mg Subcutaneous Daily Hang Varghese MD   40 mg at 01/03/20 1741    melatonin tablet 6 mg  6 mg Oral Nightly PRN Seng Vera MD   6 mg at 01/04/20 0048    methocarbamol tablet 500 mg  500 mg Oral QID Kip Burnett MD   500 mg at 01/03/20 1339    mupirocin 2 % ointment 1 g  1 g Nasal BID Seng Vera MD   1 g at 01/03/20 2321     ondansetron injection 4 mg  4 mg Intravenous Q12H PRN Kip Burnett MD        polyethylene glycol packet 17 g  17 g Oral Daily Seng Vera MD   17 g at 01/03/20 0938    potassium, sodium phosphates 280-160-250 mg packet 1 packet  1 packet Oral QID (WM & HS) Kamini Collado MD   1 packet at 01/03/20 1741    promethazine (PHENERGAN) 6.25 mg in dextrose 5 % 50 mL IVPB  6.25 mg Intravenous Q6H PRN Kip Burnett MD        ropivacaine (PF) 2 mg/ml (0.2%) infusion  2 mL/hr Perineural Continuous Kip Burnett MD 2 mL/hr at 01/04/20 0700 2 mL/hr at 01/04/20 0700    sodium chloride 0.9% flush 10 mL  10 mL Intravenous PRN Alejandro Maloney DO        sodium chloride 0.9% flush 10 mL  10 mL Intravenous PRN Seng Vera MD        sodium chloride 0.9% flush 10 mL  10 mL Intravenous PRN Elias Rolon MD        vitamin D 1000 units tablet 2,000 Units  2,000 Units Oral Daily aHng Varghese MD   2,000 Units at 01/03/20 0938        Anticoagulant dose: lovenox    Assessment:     Pain control adequate    Plan:     Patient doing well, continue present treatment.   - Pt slightly disoriented this AM; recommend delirium precautions   -Continue PNC at current rate.  - Continue multimodal analgesia with Tylenol and Robaxin.  - Encouraged PT/OT and mobility OOB.  - Dispo: per primary team, PT recommending skilled. Will continue PNC until placement     Evaluator Laurie Hammonds

## 2020-01-04 NOTE — ASSESSMENT & PLAN NOTE
Franny Arnold is a 89 y.o. female s/p L femur CMN on 1/2/20 with L prox humerus fx.      - Weight bearing status: WBAT LLE and NWB LUE  - Pain control: Per APS  - Antibiotics: none  - Hgb slight dip to 8.5 this am  - DVT Prophylaxis: lovenox , SCD's at all times when not ambulating.  - PT/OT  - Dispo: continue medical care with early mobilization, unable to ambulate yesterday, stood for 20 seconds. Will discuss altered mental status with Dr Collado this am

## 2020-01-04 NOTE — SUBJECTIVE & OBJECTIVE
"Principal Problem:Closed displaced intertrochanteric fracture of left femur    Principal Orthopedic Problem: Same    Interval History: Patient seen and examined at bedside.  No acute events overnight, per the nurse, her mental status has been off since day team yesterday. She's not oriented to place or time, not aware of her surroundings. Confirmed that medicine team is aware of this, will speak with them today.  Pain controlled.      Review of patient's allergies indicates:  No Known Allergies    Current Facility-Administered Medications   Medication    acetaminophen tablet 650 mg    bisacodyl suppository 10 mg    enoxaparin injection 40 mg    melatonin tablet 6 mg    methocarbamol tablet 500 mg    mupirocin 2 % ointment 1 g    ondansetron injection 4 mg    polyethylene glycol packet 17 g    potassium, sodium phosphates 280-160-250 mg packet 1 packet    promethazine (PHENERGAN) 6.25 mg in dextrose 5 % 50 mL IVPB    ropivacaine (PF) 2 mg/ml (0.2%) infusion    sodium chloride 0.9% flush 10 mL    sodium chloride 0.9% flush 10 mL    sodium chloride 0.9% flush 10 mL    vitamin D 1000 units tablet 2,000 Units     Objective:     Vital Signs (Most Recent):  Temp: 98 °F (36.7 °C) (01/03/20 2157)  Pulse: 73 (01/04/20 0339)  Resp: 18 (01/03/20 2157)  BP: 118/61 (01/03/20 2157)  SpO2: (!) 94 % (01/03/20 2157) Vital Signs (24h Range):  Temp:  [95.7 °F (35.4 °C)-98.3 °F (36.8 °C)] 98 °F (36.7 °C)  Pulse:  [73-95] 73  Resp:  [18] 18  SpO2:  [90 %-96 %] 94 %  BP: (105-118)/(61-68) 118/61     Weight: 54.4 kg (120 lb)  Height: 5' 4" (162.6 cm)  Body mass index is 20.6 kg/m².      Intake/Output Summary (Last 24 hours) at 1/4/2020 0625  Last data filed at 1/3/2020 1800  Gross per 24 hour   Intake 600 ml   Output 600 ml   Net 0 ml       Ortho/SPM Exam      Physical Exam:  NAD, A/O x 3.  Wound c/d/i with clean dressing.  No focal motor or sensory deficits noted.  LUE:   In sling and swath with " ecchymosis  NVI      Significant Labs:   CBC:   Recent Labs   Lab 01/02/20  1321 01/03/20  0137   WBC 23.75* 15.19*   HGB 12.0 9.4*   HCT 40.2 30.9*    190     All pertinent labs within the past 24 hours have been reviewed.    Significant Imaging: I have reviewed and interpreted all pertinent imaging results/findings.

## 2020-01-04 NOTE — PT/OT/SLP PROGRESS
Physical Therapy Treatment    Patient Name:  Franny Arnold   MRN:  [id number]    Recommendations:     Discharge Recommendations:  nursing facility, skilled   Discharge Equipment Recommendations: bedside commode, cane, quad, wheelchair   Barriers to discharge: Inaccessible home and Decreased caregiver support    Assessment:     Franny Arnold is a 89 y.o. female admitted with a medical diagnosis of Closed displaced intertrochanteric fracture of left femur.  She presents with the following impairments/functional limitations:  weakness, impaired endurance, impaired self care skills, impaired functional mobilty, gait instability, impaired balance, decreased lower extremity function, decreased safety awareness, decreased upper extremity function, impaired cognition, decreased coordination, edema, orthopedic precautions, pain, impaired joint extensibility . Patient was very anxious and mostly disoriented, constantly requesting for the presence of her Doctor, despite continuous re-orientation. Patient showed limited postural control in sitting, with a tendency to lean back.    Rehab Prognosis: Fair; patient would benefit from acute skilled PT services to address these deficits and reach maximum level of function.    Recent Surgery: Procedure(s) (LRB):  INSERTION, INTRAMEDULLARY ZAYNAB (Left) 2 Days Post-Op    Plan:     During this hospitalization, patient to be seen daily to address the identified rehab impairments via gait training, therapeutic activities, therapeutic exercises, neuromuscular re-education and progress toward the following goals:    · Plan of Care Expires:  01/17/20    Subjective     Chief Complaint: unable to express due to confusion.  Patient/Family Comments/goals: to go home now.  Pain/Comfort:  · Pain Rating 1: (Unable to rate due to confusion, but min discomfort noted only with bed mobility.)  · Location - Side 1: Left  · Location - Orientation 1: generalized  · Location 1: arm  · Pain Addressed 1:  Pre-medicate for activity, Reposition, Distraction, Cessation of Activity  · Pain Rating Post-Intervention 1: (Did not rate)      Objective:     Communicated with nsg prior to session.  Patient found HOB elevated with perineural catheter, FCD, telemetry upon PT entry to room.     General Precautions: Standard, fall   Orthopedic Precautions:LUE non weight bearing, LLE weight bearing as tolerated   Braces: Sling and swathe     Functional Mobility:  · Bed Mobility:     · Rolling Right: total assistance and of 2 persons  · Scooting: total assistance and of 2 persons  · Supine to Sit: total assistance and of 2 persons  · Sit to Supine: total assistance and of 2 persons  · Balance: poor in sitting.      AM-PAC 6 CLICK MOBILITY  Turning over in bed (including adjusting bedclothes, sheets and blankets)?: 2  Sitting down on and standing up from a chair with arms (e.g., wheelchair, bedside commode, etc.): 2  Moving from lying on back to sitting on the side of the bed?: 2  Moving to and from a bed to a chair (including a wheelchair)?: 1  Need to walk in hospital room?: 1  Climbing 3-5 steps with a railing?: 1  Basic Mobility Total Score: 9       Therapeutic Activities and Exercises:   Co-treatment performed with O.T. Static sitting balance at EOB x 10 minutes with mod assistance.  B LE PROM x 30 reps on all available planes of motion. Repositioned in bed after treatment.    Patient left HOB elevated with all lines intact, call button in reach, bed alarm on and NSG notified..    GOALS:   Multidisciplinary Problems     Physical Therapy Goals        Problem: Physical Therapy Goal    Goal Priority Disciplines Outcome Goal Variances Interventions   Physical Therapy Goal     PT, PT/OT Ongoing, Progressing     Description:  Goals to be met by: 20    Patient will increase functional independence with mobility by performin. Supine to sit with Stand-by Assistance  2. Sit to stand transfer with Minimal Assistance  3. Bed to  chair transfer with Minimal Assistance using LRAD  4. Gait x10 feet with Minimal Assistance using LRAD  5. Lower extremity exercise program x10 reps per handout, with assistance as needed                      Time Tracking:     PT Received On: 01/04/20  PT Start Time: 1020     PT Stop Time: 1045  PT Total Time (min): 25 min     Billable Minutes: Therapeutic Exercise 13 and Neuromuscular Re-education 12    Treatment Type: Treatment, Other (see comments)(Co-treatment with O.T.)  PT/PTA: PTA     PTA Visit Number: Anjelica Mcdonough, PTA  01/04/2020

## 2020-01-04 NOTE — PT/OT/SLP PROGRESS
"Occupational Therapy   Treatment    Name: Franny Arnold  MRN: [id number]  Admitting Diagnosis:  Closed displaced intertrochanteric fracture of left femur  2 Days Post-Op    Recommendations:     Discharge Recommendations: nursing facility, skilled  Discharge Equipment Recommendations:  bedside commode, cane, quad, wheelchair  Barriers to discharge:  Decreased caregiver support    Assessment:     Franny Arnold is a 89 y.o. female with a medical diagnosis of Closed displaced intertrochanteric fracture of left femur.  She presents with confusion throughout session. Pt perseverating on MD being unaware she was in hospital and stated "I am going to put clothes on and leave." Pt re-oriented throughout session and RN notified. Bed alarm activated upon therapy exit.  Performance deficits affecting function are weakness, impaired endurance, impaired self care skills, impaired functional mobilty, gait instability, impaired balance, impaired cognition, decreased safety awareness, decreased lower extremity function, decreased upper extremity function, orthopedic precautions, pain. Pt would benefit from skilled OT services in order to maximize independence with ADLs and facilitate safe discharge.      Rehab Prognosis:  Fair; patient would benefit from acute skilled OT services to address these deficits and reach maximum level of function.       Plan:     Patient to be seen daily to address the above listed problems via self-care/home management, therapeutic activities, therapeutic exercises  · Plan of Care Expires: 02/02/20  · Plan of Care Reviewed with: patient    Subjective     Pain/Comfort:  · Pain Rating 1: (Unable to rate due to confusion, but min discomfort noted only with bed mobility)  · Location - Side 1: Left  · Location - Orientation 1: generalized  · Location 1: arm  · Pain Addressed 1: Pre-medicate for activity, Reposition, Cessation of Activity, Distraction  · Pain Rating Post-Intervention 1: (did not " rate)    Objective:     Communicated with: RN prior to session.  Patient found HOB elevated with FCD, bed alarm, perineural catheter, telemetry, bed alarm upon OT entry to room.    General Precautions: Standard, fall   Orthopedic Precautions:LUE non weight bearing, LLE weight bearing as tolerated   Braces: Sling and swathe     Occupational Performance:     Bed Mobility:    · Patient completed Rolling/Turning to Right with maximal assistance x2 persons  · Patient completed Supine to Sit with maximal assistance x2 persons  · Patient completed Sit to Supine with maximal assistance x2 persons    Functional Mobility/Transfers:  · Not assessed 2/2 confusion    Activities of Daily Living:  · Grooming: total assistance to comb hair seated EOB   · UE dressing: therapist repositioned LUE sling    Therapeutic Exercise:  L hand, wrist, and elbow PROM exercises performed- pt unable to follow commands to complete A/AAROM exercises 2/2 confusion    [facility] 6 Click ADL: 8    Treatment & Education:  Pt sat EOB ~10 minutes with posterior lean and mod A to maintain balance to perform ADLs  Pt demonstrated confusion throughout session limiting overall active participation. RN notified of pt's mental status  -Therapist provided facilitation and instruction of proper body mechanics, energy conservation, and fall prevention strategies during tasks listed above.  -Pt educated on role of OT, POC and goals for therapy  -Pt  Educated on L LE WBAT and LUE NWB  -Whiteboard updated    Patient left HOB elevated with all lines intact, call button in reach, bed alarm on and RN notifiedEducation:      GOALS:   Multidisciplinary Problems     Occupational Therapy Goals        Problem: Occupational Therapy Goal    Goal Priority Disciplines Outcome Interventions   Occupational Therapy Goal     OT, PT/OT Ongoing, Progressing    Description:  Goals to be met by: 2/2/2020     Patient will increase functional independence with ADLs by performing:    UE  Dressing with Moderate Assistance.  LE Dressing with Maximum Assistance and Assistive Devices as needed.  Grooming while EOB with Minimal Assistance.  Toileting from bedside commode with Moderate Assistance for hygiene and clothing management.   Sitting at edge of bed x10 minutes with Contact Guard Assistance.  Supine to sit with Moderate Assistance.  Toilet transfer to bedside commode with Maximum Assistance.                      Time Tracking:     OT Date of Treatment: 01/04/20  OT Start Time: 1019  OT Stop Time: 1045  OT Total Time (min): 26 min    Billable Minutes:Self Care/Home Management 10  Therapeutic Activity 16    Helena Ayala OT  1/4/2020

## 2020-01-04 NOTE — PROGRESS NOTES
"[facility]  Orthopedics  Progress Note    Patient Name: Franny Arnold  MRN: [id number]  Admission Date: 1/1/2020  Hospital Length of Stay: 3 days  Attending Provider: Kamini Collado MD  Primary Care Provider: Dinesh Wylie MD  Follow-up For: Procedure(s) (LRB):  INSERTION, INTRAMEDULLARY ZAYNAB (Left)    Post-Operative Day: 2 Days Post-Op  Subjective:     Principal Problem:Closed displaced intertrochanteric fracture of left femur    Principal Orthopedic Problem: Same    Interval History: Patient seen and examined at bedside.  No acute events overnight, per the nurse, her mental status has been off since day team yesterday. She's not oriented to place or time, not aware of her surroundings. Confirmed that medicine team is aware of this, will speak with them today.  Pain controlled.      Review of patient's allergies indicates:  No Known Allergies    Current Facility-Administered Medications   Medication    acetaminophen tablet 650 mg    bisacodyl suppository 10 mg    enoxaparin injection 40 mg    melatonin tablet 6 mg    methocarbamol tablet 500 mg    mupirocin 2 % ointment 1 g    ondansetron injection 4 mg    polyethylene glycol packet 17 g    potassium, sodium phosphates 280-160-250 mg packet 1 packet    promethazine (PHENERGAN) 6.25 mg in dextrose 5 % 50 mL IVPB    ropivacaine (PF) 2 mg/ml (0.2%) infusion    sodium chloride 0.9% flush 10 mL    sodium chloride 0.9% flush 10 mL    sodium chloride 0.9% flush 10 mL    vitamin D 1000 units tablet 2,000 Units     Objective:     Vital Signs (Most Recent):  Temp: 98 °F (36.7 °C) (01/03/20 2157)  Pulse: 73 (01/04/20 0339)  Resp: 18 (01/03/20 2157)  BP: 118/61 (01/03/20 2157)  SpO2: (!) 94 % (01/03/20 2157) Vital Signs (24h Range):  Temp:  [95.7 °F (35.4 °C)-98.3 °F (36.8 °C)] 98 °F (36.7 °C)  Pulse:  [73-95] 73  Resp:  [18] 18  SpO2:  [90 %-96 %] 94 %  BP: (105-118)/(61-68) 118/61     Weight: 54.4 kg (120 lb)  Height: 5' 4" (162.6 cm)  Body " mass index is 20.6 kg/m².      Intake/Output Summary (Last 24 hours) at 1/4/2020 0625  Last data filed at 1/3/2020 1800  Gross per 24 hour   Intake 600 ml   Output 600 ml   Net 0 ml       Ortho/SPM Exam      Physical Exam:  NAD, A/O x 3.  Wound c/d/i with clean dressing.  No focal motor or sensory deficits noted.  LUE:   In sling and swath with ecchymosis  NVI      Significant Labs:   CBC:   Recent Labs   Lab 01/02/20  1321 01/03/20  0137   WBC 23.75* 15.19*   HGB 12.0 9.4*   HCT 40.2 30.9*    190     All pertinent labs within the past 24 hours have been reviewed.    Significant Imaging: I have reviewed and interpreted all pertinent imaging results/findings.    Assessment/Plan:     * Closed displaced intertrochanteric fracture of left femur  Franny Arnold is a 89 y.o. female s/p L femur CMN on 1/2/20 with L prox humerus fx.      - Weight bearing status: WBAT LLE and NWB LUE  - Pain control: Per APS  - Antibiotics: none  - Hgb slight dip to 8.5 this am  - DVT Prophylaxis: lovenox , SCD's at all times when not ambulating.  - PT/OT  - Dispo: continue medical care with early mobilization, unable to ambulate yesterday, stood for 20 seconds. Will discuss altered mental status with Dr Collado this am            Fracture of proximal end of left humerus  Sling and swath, NWB LUE          Elias Rolon MD  Orthopedics  [facility]

## 2020-01-04 NOTE — PLAN OF CARE
Problem: Occupational Therapy Goal  Goal: Occupational Therapy Goal  Description  Goals to be met by: 2/2/2020     Patient will increase functional independence with ADLs by performing:    UE Dressing with Moderate Assistance.  LE Dressing with Maximum Assistance and Assistive Devices as needed.  Grooming while EOB with Minimal Assistance.  Toileting from bedside commode with Moderate Assistance for hygiene and clothing management.   Sitting at edge of bed x10 minutes with Contact Guard Assistance.  Supine to sit with Moderate Assistance.  Toilet transfer to bedside commode with Maximum Assistance.     Outcome: Ongoing, Progressing     Goals reviewed and remain appropriate, continue POC.     ARTURO Longo/L  1/4/2020   Pager #: [phone]

## 2020-01-04 NOTE — PLAN OF CARE
Problem: Physical Therapy Goal  Goal: Physical Therapy Goal  Description  Goals to be met by: 20    Patient will increase functional independence with mobility by performin. Supine to sit with Stand-by Assistance  2. Sit to stand transfer with Minimal Assistance  3. Bed to chair transfer with Minimal Assistance using LRAD  4. Gait x10 feet with Minimal Assistance using LRAD  5. Lower extremity exercise program x10 reps per handout, with assistance as needed     Outcome: Ongoing, Progressing.  Functional mobility continues to be very limited due to pain, confusion and orthopedic restrictions.

## 2020-01-04 NOTE — ADDENDUM NOTE
Addendum  created 01/04/20 0916 by Jama Gant MD    Charge Capture section accepted, Cosign clinical note with attestation

## 2020-01-04 NOTE — PLAN OF CARE
Pt is AAOX1, VSS. Pt reoriented throughout shift. No report of falls, Fall precautions in place. SCDs in place, frequent checks for skin breakdown. No complaints of pain. Will continue to monitor.

## 2020-01-04 NOTE — NURSING
On call MD called to make aware of pt mental status, Ortho MD, was making rounds and noted that Pt mental status had changed from what it was pre surgery. Nurse was told in report of altered mental status from day shift nurse from previous day. Night on call MD was aware pt was confused. After talking with ortho wanted medicine team to be aware that this is not baseline. Made them aware, and for now continue to monitor, they will look further into it when they make rounds.

## 2020-01-05 LAB
ANION GAP SERPL CALC-SCNC: 7 MMOL/L (ref 8–16)
BASOPHILS # BLD AUTO: 0.02 K/UL (ref 0–0.2)
BASOPHILS NFR BLD: 0.2 % (ref 0–1.9)
BUN SERPL-MCNC: 14 MG/DL (ref 10–30)
CALCIUM SERPL-MCNC: 8.4 MG/DL (ref 8.7–10.5)
CHLORIDE SERPL-SCNC: 108 MMOL/L (ref 95–110)
CO2 SERPL-SCNC: 26 MMOL/L (ref 23–29)
CREAT SERPL-MCNC: 0.8 MG/DL (ref 0.5–1.4)
DIFFERENTIAL METHOD: ABNORMAL
EOSINOPHIL # BLD AUTO: 0.1 K/UL (ref 0–0.5)
EOSINOPHIL NFR BLD: 0.7 % (ref 0–8)
ERYTHROCYTE [DISTWIDTH] IN BLOOD BY AUTOMATED COUNT: 15.7 % (ref 11.5–14.5)
EST. GFR  (AFRICAN AMERICAN): >60 ML/MIN/1.73 M^2
EST. GFR  (NON AFRICAN AMERICAN): >60 ML/MIN/1.73 M^2
GLUCOSE SERPL-MCNC: 96 MG/DL (ref 70–110)
HCT VFR BLD AUTO: 29.9 % (ref 37–48.5)
HGB BLD-MCNC: 9.3 G/DL (ref 12–16)
IMM GRANULOCYTES # BLD AUTO: 0.14 K/UL (ref 0–0.04)
IMM GRANULOCYTES NFR BLD AUTO: 1.4 % (ref 0–0.5)
LYMPHOCYTES # BLD AUTO: 1.6 K/UL (ref 1–4.8)
LYMPHOCYTES NFR BLD: 15.8 % (ref 18–48)
MAGNESIUM SERPL-MCNC: 1.9 MG/DL (ref 1.6–2.6)
MCH RBC QN AUTO: 27.8 PG (ref 27–31)
MCHC RBC AUTO-ENTMCNC: 31.1 G/DL (ref 32–36)
MCV RBC AUTO: 90 FL (ref 82–98)
MONOCYTES # BLD AUTO: 0.8 K/UL (ref 0.3–1)
MONOCYTES NFR BLD: 8 % (ref 4–15)
NEUTROPHILS # BLD AUTO: 7.6 K/UL (ref 1.8–7.7)
NEUTROPHILS NFR BLD: 73.9 % (ref 38–73)
NRBC BLD-RTO: 0 /100 WBC
PHOSPHATE SERPL-MCNC: 2.8 MG/DL (ref 2.7–4.5)
PLATELET # BLD AUTO: 236 K/UL (ref 150–350)
PMV BLD AUTO: 9.7 FL (ref 9.2–12.9)
POTASSIUM SERPL-SCNC: 3.8 MMOL/L (ref 3.5–5.1)
RBC # BLD AUTO: 3.34 M/UL (ref 4–5.4)
SODIUM SERPL-SCNC: 141 MMOL/L (ref 136–145)
WBC # BLD AUTO: 10.23 K/UL (ref 3.9–12.7)

## 2020-01-05 PROCEDURE — 25000003 PHARM REV CODE 250: Performed by: STUDENT IN AN ORGANIZED HEALTH CARE EDUCATION/TRAINING PROGRAM

## 2020-01-05 PROCEDURE — 80048 BASIC METABOLIC PNL TOTAL CA: CPT

## 2020-01-05 PROCEDURE — 99232 PR SUBSEQUENT HOSPITAL CARE,LEVL II: ICD-10-PCS | Mod: ,,, | Performed by: HOSPITALIST

## 2020-01-05 PROCEDURE — 97112 NEUROMUSCULAR REEDUCATION: CPT

## 2020-01-05 PROCEDURE — 97110 THERAPEUTIC EXERCISES: CPT

## 2020-01-05 PROCEDURE — 97110 THERAPEUTIC EXERCISES: CPT | Mod: CQ

## 2020-01-05 PROCEDURE — 85025 COMPLETE CBC W/AUTO DIFF WBC: CPT

## 2020-01-05 PROCEDURE — 25000003 PHARM REV CODE 250: Performed by: HOSPITALIST

## 2020-01-05 PROCEDURE — 94761 N-INVAS EAR/PLS OXIMETRY MLT: CPT

## 2020-01-05 PROCEDURE — 11000001 HC ACUTE MED/SURG PRIVATE ROOM

## 2020-01-05 PROCEDURE — 63600175 PHARM REV CODE 636 W HCPCS: Performed by: STUDENT IN AN ORGANIZED HEALTH CARE EDUCATION/TRAINING PROGRAM

## 2020-01-05 PROCEDURE — 83735 ASSAY OF MAGNESIUM: CPT

## 2020-01-05 PROCEDURE — 99232 SBSQ HOSP IP/OBS MODERATE 35: CPT | Mod: ,,, | Performed by: HOSPITALIST

## 2020-01-05 PROCEDURE — 36415 COLL VENOUS BLD VENIPUNCTURE: CPT

## 2020-01-05 PROCEDURE — 84100 ASSAY OF PHOSPHORUS: CPT

## 2020-01-05 RX ORDER — ACETAMINOPHEN 325 MG/1
650 TABLET ORAL EVERY 6 HOURS
Status: DISCONTINUED | OUTPATIENT
Start: 2020-01-05 | End: 2020-01-08 | Stop reason: HOSPADM

## 2020-01-05 RX ADMIN — POTASSIUM & SODIUM PHOSPHATES POWDER PACK 280-160-250 MG 1 PACKET: 280-160-250 PACK at 08:01

## 2020-01-05 RX ADMIN — ROPIVACAINE HYDROCHLORIDE 2 ML/HR: 2 INJECTION, SOLUTION EPIDURAL; INFILTRATION at 03:01

## 2020-01-05 RX ADMIN — ACETAMINOPHEN 650 MG: 325 TABLET ORAL at 12:01

## 2020-01-05 RX ADMIN — MUPIROCIN 1 G: 20 OINTMENT TOPICAL at 08:01

## 2020-01-05 RX ADMIN — ROPIVACAINE HYDROCHLORIDE 2 ML/HR: 2 INJECTION, SOLUTION EPIDURAL; INFILTRATION at 12:01

## 2020-01-05 RX ADMIN — ROPIVACAINE HYDROCHLORIDE 2 ML/HR: 2 INJECTION, SOLUTION EPIDURAL; INFILTRATION at 07:01

## 2020-01-05 RX ADMIN — ROPIVACAINE HYDROCHLORIDE 2 ML/HR: 2 INJECTION, SOLUTION EPIDURAL; INFILTRATION at 11:01

## 2020-01-05 RX ADMIN — METHOCARBAMOL TABLETS 500 MG: 500 TABLET, COATED ORAL at 05:01

## 2020-01-05 RX ADMIN — MUPIROCIN 1 G: 20 OINTMENT TOPICAL at 09:01

## 2020-01-05 RX ADMIN — METHOCARBAMOL TABLETS 500 MG: 500 TABLET, COATED ORAL at 09:01

## 2020-01-05 RX ADMIN — METHOCARBAMOL TABLETS 500 MG: 500 TABLET, COATED ORAL at 12:01

## 2020-01-05 RX ADMIN — Medication 6 MG: at 10:01

## 2020-01-05 RX ADMIN — POLYETHYLENE GLYCOL 3350 17 G: 17 POWDER, FOR SOLUTION ORAL at 08:01

## 2020-01-05 RX ADMIN — ENOXAPARIN SODIUM 40 MG: 100 INJECTION SUBCUTANEOUS at 05:01

## 2020-01-05 RX ADMIN — METHOCARBAMOL TABLETS 500 MG: 500 TABLET, COATED ORAL at 08:01

## 2020-01-05 RX ADMIN — MELATONIN 2000 UNITS: at 08:01

## 2020-01-05 RX ADMIN — ACETAMINOPHEN 650 MG: 325 TABLET ORAL at 05:01

## 2020-01-05 NOTE — PT/OT/SLP PROGRESS
Occupational Therapy   Treatment    Name: Franny Arnold  MRN: [id number]  Admitting Diagnosis:  Closed displaced intertrochanteric fracture of left femur  3 Days Post-Op   INSERTION, INTRAMEDULLARY ZAYNAB (Left)     Recommendations:     Discharge Recommendations: nursing facility, skilled  Discharge Equipment Recommendations:  bedside commode, cane, quad, wheelchair  Barriers to discharge:  Decreased caregiver support    Assessment:     Franny Arnold is a 89 y.o. female with a medical diagnosis of Closed displaced intertrochanteric fracture of left femur.  She presents with improvement in orientation/mentation this day in comparison to yesterday's notes. Patient agreeable to participate with therapy in ROM exercises and is pleasant throughout session. Performance deficits affecting function are weakness, impaired endurance, impaired self care skills, impaired functional mobilty, impaired cognition, decreased safety awareness, decreased lower extremity function, decreased upper extremity function, pain, decreased ROM, impaired joint extensibility, orthopedic precautions, gait instability, impaired balance.     Rehab Prognosis:  Fair; patient would benefit from acute skilled OT services to address these deficits and reach maximum level of function.       Plan:     Patient to be seen daily to address the above listed problems via self-care/home management, therapeutic activities, therapeutic exercises  · Plan of Care Expires: 02/02/20  · Plan of Care Reviewed with: patient    Subjective     Pain/Comfort:  · Pain Rating 1: other (see comments)(Did not rate)    Objective:     Communicated with: RN prior to session.  Patient found HOB elevated with bed alarm, FCD, perineural catheter, telemetry upon OT entry to room.    General Precautions: Standard, fall   Orthopedic Precautions:LUE non weight bearing, LLE weight bearing as tolerated   Braces: Sling and swathe     Occupational Performance:     Bed Mobility:     · Did not  observe    Functional Mobility/Transfers:  · Did not observe    Activities of Daily Living:  · Denied need for ADLs at this time - recently was cleaned up, brushed her teeth, and used the bed pan    Therapeutic Exercise:     · Patient participated in A/AROM exercises with L hand, wrist, and elbow with patient completing 1x10 for digit flexion/extension, digit abduction/adduction, wrist flexion/extension, elbow flexion/extension      [facility] 6 Click ADL: 8    Treatment & Education:  Role of OT/POC  Call button for assistance    Patient left HOB elevated with all lines intact, call button in reach, RN notified and family presentEducation:      GOALS:   Multidisciplinary Problems     Occupational Therapy Goals        Problem: Occupational Therapy Goal    Goal Priority Disciplines Outcome Interventions   Occupational Therapy Goal     OT, PT/OT Ongoing, Progressing    Description:  Goals to be met by: 2/2/2020     Patient will increase functional independence with ADLs by performing:    UE Dressing with Moderate Assistance.  LE Dressing with Maximum Assistance and Assistive Devices as needed.  Grooming while EOB with Minimal Assistance.  Toileting from bedside commode with Moderate Assistance for hygiene and clothing management.   Sitting at edge of bed x10 minutes with Contact Guard Assistance.  Supine to sit with Moderate Assistance.  Toilet transfer to bedside commode with Maximum Assistance.                      Time Tracking:     OT Date of Treatment: 01/05/20  OT Start Time: 1405  OT Stop Time: 1417  OT Total Time (min): 12 min    Billable Minutes:Therapeutic Exercise 12 minutes    Clarice Bruce OT  1/5/2020

## 2020-01-05 NOTE — NURSING
Bedside report received from DOTTIE Stevens. Pt confused and anxious. VSS. PIV x 1 saline locked. PNC infusing. Aquacel x 3 c/d/i to left leg. No s/s of distress or pain. All needs addressed. All safety precautions in place. Bed alarm on.

## 2020-01-05 NOTE — PLAN OF CARE
Problem: Physical Therapy Goal  Goal: Physical Therapy Goal  Description  Goals to be met by: 20    Patient will increase functional independence with mobility by performin. Supine to sit with Stand-by Assistance  2. Sit to stand transfer with Minimal Assistance  3. Bed to chair transfer with Minimal Assistance using LRAD  4. Gait x10 feet with Minimal Assistance using LRAD  5. Lower extremity exercise program x10 reps per handout, with assistance as needed     Outcome: Ongoing, Progressing.  Cognitive level has improved, but mobility continues to be very limited due to orthopedic restrictions and generalized weakness.

## 2020-01-05 NOTE — ASSESSMENT & PLAN NOTE
Unclear etiology  UA not suggestive of UTI  CXR WNL   Afebrile   Will continue to monitor with daily CBCs  Trending down  Most likely stress response   Now resolved

## 2020-01-05 NOTE — PLAN OF CARE
Problem: Occupational Therapy Goal  Goal: Occupational Therapy Goal  Description  Goals to be met by: 2/2/2020     Patient will increase functional independence with ADLs by performing:    UE Dressing with Moderate Assistance.  LE Dressing with Maximum Assistance and Assistive Devices as needed.  Grooming while EOB with Minimal Assistance.  Toileting from bedside commode with Moderate Assistance for hygiene and clothing management.   Sitting at edge of bed x10 minutes with Contact Guard Assistance.  Supine to sit with Moderate Assistance.  Toilet transfer to bedside commode with Maximum Assistance.     Outcome: Ongoing, Progressing    Continue with POC.  Clarice Bruce OT  1/5/2020

## 2020-01-05 NOTE — PLAN OF CARE
Pt is AAOX1 confused,VSS. Pt is progressing towards plan of care goals. Pain management has been assessed. Pt does not report any pain.pain is reassessed throughout shift. Foot pumps  in place with frequent checks for skin breakdown. Fall precautions in place, no report of falls. Safety measures are in place bed in lowest position, wheels locked, call light within reach. Niece at bedside. WILL continue to monitor.

## 2020-01-05 NOTE — SUBJECTIVE & OBJECTIVE
Interval History: POD #2. Some confusion noted this am. Frazzled by not being able to dial out on her phone.     Review of Systems   Constitutional: Negative for chills and fever.   HENT: Negative.    Eyes: Negative for visual disturbance.   Respiratory: Negative for cough and shortness of breath.    Cardiovascular: Negative for chest pain and palpitations.   Gastrointestinal: Negative for abdominal pain, diarrhea, nausea and vomiting.   Genitourinary: Negative for difficulty urinating, frequency and hematuria.   Neurological: Negative for light-headedness and headaches.   Psychiatric/Behavioral: The patient is not nervous/anxious.      Objective:     Vital Signs (Most Recent):  Temp: 98.5 °F (36.9 °C) (01/04/20 1610)  Pulse: 88 (01/04/20 1610)  Resp: 18 (01/04/20 1610)  BP: (!) 99/59 (01/04/20 1610)  SpO2: (!) 92 % (01/04/20 1610) Vital Signs (24h Range):  Temp:  [96.4 °F (35.8 °C)-98.5 °F (36.9 °C)] 98.5 °F (36.9 °C)  Pulse:  [73-95] 88  Resp:  [18] 18  SpO2:  [92 %-95 %] 92 %  BP: ()/(59-71) 99/59     Weight: 54.4 kg (120 lb)  Body mass index is 20.6 kg/m².    Intake/Output Summary (Last 24 hours) at 1/4/2020 1827  Last data filed at 1/4/2020 1800  Gross per 24 hour   Intake 720 ml   Output --   Net 720 ml      Physical Exam   Constitutional:   Thin elderly female lying comfortably in nad   HENT:   Head: Normocephalic and atraumatic.   Cardiovascular: Normal rate and regular rhythm.   Pulmonary/Chest: Effort normal. No respiratory distress.   Abdominal: Soft. Bowel sounds are normal. She exhibits no distension.   Musculoskeletal: She exhibits edema (dressing and mild edema of LLE).   LUE in sling   Neurological: She is alert.   Oriented to person    Vitals reviewed.      Significant Labs: All pertinent labs within the past 24 hours have been reviewed.    Significant Imaging: I have reviewed all pertinent imaging results/findings within the past 24 hours.

## 2020-01-05 NOTE — ASSESSMENT & PLAN NOTE
Intermittent confusion but stable  Niece noted to me that she does have some dementia / memory impairments   Will continue to monitor closely

## 2020-01-05 NOTE — ADDENDUM NOTE
Addendum  created 01/05/20 0901 by Jama Gant MD    Charge Capture section accepted, Cosign clinical note with attestation

## 2020-01-05 NOTE — SUBJECTIVE & OBJECTIVE
"Principal Problem:Closed displaced intertrochanteric fracture of left femur    Principal Orthopedic Problem: Same    Interval History: Patient seen and examined at bedside.  No acute events overnight,patient resting this am. Still with some confusion. Hgb stable at 9.5. Transfers with PT yesterday.   Review of patient's allergies indicates:  No Known Allergies    Current Facility-Administered Medications   Medication    acetaminophen tablet 650 mg    bisacodyl suppository 10 mg    enoxaparin injection 40 mg    melatonin tablet 6 mg    methocarbamol tablet 500 mg    mupirocin 2 % ointment 1 g    ondansetron injection 4 mg    polyethylene glycol packet 17 g    potassium, sodium phosphates 280-160-250 mg packet 1 packet    promethazine (PHENERGAN) 6.25 mg in dextrose 5 % 50 mL IVPB    ropivacaine (PF) 2 mg/ml (0.2%) infusion    sodium chloride 0.9% flush 10 mL    sodium chloride 0.9% flush 10 mL    sodium chloride 0.9% flush 10 mL    vitamin D 1000 units tablet 2,000 Units     Objective:     Vital Signs (Most Recent):  Temp: 99.5 °F (37.5 °C) (01/04/20 2019)  Pulse: 91 (01/05/20 0313)  Resp: 18 (01/04/20 2019)  BP: 132/83 (01/04/20 2019)  SpO2: (!) 94 % (01/04/20 2019) Vital Signs (24h Range):  Temp:  [96.4 °F (35.8 °C)-99.5 °F (37.5 °C)] 99.5 °F (37.5 °C)  Pulse:  [77-92] 91  Resp:  [18] 18  SpO2:  [92 %-95 %] 94 %  BP: ()/(59-83) 132/83     Weight: 54.4 kg (120 lb)  Height: 5' 4" (162.6 cm)  Body mass index is 20.6 kg/m².      Intake/Output Summary (Last 24 hours) at 1/5/2020 0641  Last data filed at 1/4/2020 1800  Gross per 24 hour   Intake 720 ml   Output --   Net 720 ml       Ortho/SPM Exam      Physical Exam:  NAD, A/O x 3.  Wound c/d/i with clean dressing.  No focal motor or sensory deficits noted.  LUE:   In sling and swath with ecchymosis  NVI      Significant Labs:   CBC:   Recent Labs   Lab 01/04/20  0355 01/05/20  0446   WBC 11.48 10.23   HGB 8.5* 9.3*   HCT 28.8* 29.9*    236 "     All pertinent labs within the past 24 hours have been reviewed.    Significant Imaging: I have reviewed and interpreted all pertinent imaging results/findings.

## 2020-01-05 NOTE — ASSESSMENT & PLAN NOTE
Sustained while trying to get out of bed  X-ray showed: Acute intertrochanteric fracture of the proximal left femur  S/P L femur CMN on 1/2/20  Pain management per anesthesia  WBAT to LLE  PTOT  DVT prophylaxis with lovenox   H&H slowly trending down; will continue to monitor

## 2020-01-05 NOTE — PROGRESS NOTES
[facility] Hospital Medicine  Progress Note    Patient Name: Franny Arnold  MRN: [id number]  Patient Class: IP- Inpatient   Admission Date: 1/1/2020  Length of Stay: 3 days  Attending Physician: Kamini Collado MD  Primary Care Provider: Dinesh Wylie MD    [facility] Medicine Team: [facility] HOSP MED  Kamini Collado MD    Subjective:     Principal Problem:Closed displaced intertrochanteric fracture of left femur        HPI:  No notes on file    Overview/Hospital Course:  No notes on file    Interval History: POD #2. Some confusion noted this am. Frazzled by not being able to dial out on her phone.     Review of Systems   Constitutional: Negative for chills and fever.   HENT: Negative.    Eyes: Negative for visual disturbance.   Respiratory: Negative for cough and shortness of breath.    Cardiovascular: Negative for chest pain and palpitations.   Gastrointestinal: Negative for abdominal pain, diarrhea, nausea and vomiting.   Genitourinary: Negative for difficulty urinating, frequency and hematuria.   Neurological: Negative for light-headedness and headaches.   Psychiatric/Behavioral: The patient is not nervous/anxious.      Objective:     Vital Signs (Most Recent):  Temp: 98.5 °F (36.9 °C) (01/04/20 1610)  Pulse: 88 (01/04/20 1610)  Resp: 18 (01/04/20 1610)  BP: (!) 99/59 (01/04/20 1610)  SpO2: (!) 92 % (01/04/20 1610) Vital Signs (24h Range):  Temp:  [96.4 °F (35.8 °C)-98.5 °F (36.9 °C)] 98.5 °F (36.9 °C)  Pulse:  [73-95] 88  Resp:  [18] 18  SpO2:  [92 %-95 %] 92 %  BP: ()/(59-71) 99/59     Weight: 54.4 kg (120 lb)  Body mass index is 20.6 kg/m².    Intake/Output Summary (Last 24 hours) at 1/4/2020 1827  Last data filed at 1/4/2020 1800  Gross per 24 hour   Intake 720 ml   Output --   Net 720 ml      Physical Exam   Constitutional:   Thin elderly female lying comfortably in nad   HENT:   Head: Normocephalic and atraumatic.   Cardiovascular: Normal rate and regular rhythm.   Pulmonary/Chest:  Effort normal. No respiratory distress.   Abdominal: Soft. Bowel sounds are normal. She exhibits no distension.   Musculoskeletal: She exhibits edema (dressing and mild edema of LLE).   LUE in sling   Neurological: She is alert.   Oriented to person    Vitals reviewed.      Significant Labs: All pertinent labs within the past 24 hours have been reviewed.    Significant Imaging: I have reviewed all pertinent imaging results/findings within the past 24 hours.      Assessment/Plan:      * Closed displaced intertrochanteric fracture of left femur  Sustained while trying to get out of bed  X-ray showed: Acute intertrochanteric fracture of the proximal left femur  S/P L femur CMN on 1/2/20  Pain management per anesthesia  WBAT to LLE  PTOT  DVT prophylaxis with lovenox   H&H slowly trending down; will continue to monitor       Confusion  Noted some confusion today  Patient constantly stating that she came in this morning  Niece noted to me that she does have some dementia / memory impairments   Will continue to monitor closely       Leukocytosis  Unclear etiology  UA not suggestive of UTI  CXR WNL   Afebrile   Will continue to monitor with daily CBCs  Trending down  Most likely stress response   Now resolved    Fracture of proximal end of left humerus  X-ray showed: Acute proximal left humeral head/neck fracture  Sustained after a fall   In sling and swath  NWB to LUE         VTE Risk Mitigation (From admission, onward)         Ordered     enoxaparin injection 40 mg  Daily      01/02/20 1259     IP VTE HIGH RISK PATIENT  Once      01/02/20 1253     Place sequential compression device  Until discontinued      01/02/20 0749     Place ADAIR hose  Until discontinued      01/02/20 0749     Place sequential compression device  Until discontinued      01/01/20 1815                      Kamini Collado MD  Department of Hospital Medicine   [facility]

## 2020-01-05 NOTE — PLAN OF CARE
Care plan reviewed with patient and niece. Pt and niece verbalize understanding. Pt oriented to person and place. PIV x 1 saline locked. PNC infusing. Aquacel x 3 to left leg c/d/I. Purposeful rounding completed. All safety precautions in place. Call light in reach. Bed alarm on. No falls this shift.

## 2020-01-05 NOTE — SUBJECTIVE & OBJECTIVE
Interval History: No acute issues. Intermittent confusion.     Review of Systems   Constitutional: Negative for chills and fever.   HENT: Negative.    Eyes: Negative for visual disturbance.   Respiratory: Negative for cough and shortness of breath.    Cardiovascular: Negative for chest pain and palpitations.   Gastrointestinal: Negative for abdominal pain, diarrhea, nausea and vomiting.   Genitourinary: Negative for difficulty urinating, frequency and hematuria.   Neurological: Negative for light-headedness and headaches.   Psychiatric/Behavioral: The patient is not nervous/anxious.      Objective:     Vital Signs (Most Recent):  Temp: 96.8 °F (36 °C) (01/05/20 1104)  Pulse: 80 (01/05/20 1104)  Resp: 18 (01/05/20 1104)  BP: 124/85 (01/05/20 1104)  SpO2: (!) 93 % (01/05/20 1104) Vital Signs (24h Range):  Temp:  [96.8 °F (36 °C)-99.5 °F (37.5 °C)] 96.8 °F (36 °C)  Pulse:  [80-91] 80  Resp:  [16-18] 18  SpO2:  [92 %-95 %] 93 %  BP: ()/(59-85) 124/85     Weight: 54.4 kg (120 lb)  Body mass index is 20.6 kg/m².    Intake/Output Summary (Last 24 hours) at 1/5/2020 1353  Last data filed at 1/4/2020 1800  Gross per 24 hour   Intake 480 ml   Output --   Net 480 ml      Physical Exam   Constitutional:   Thin elderly female lying comfortably in nad   HENT:   Head: Normocephalic and atraumatic.   Cardiovascular: Normal rate and regular rhythm.   Pulmonary/Chest: Effort normal. No respiratory distress.   Abdominal: Soft. Bowel sounds are normal. She exhibits no distension.   Musculoskeletal: She exhibits edema (dressing and mild edema of LLE).   LUE in sling   Neurological: She is alert.   Oriented to person   calm   Vitals reviewed.      Significant Labs: All pertinent labs within the past 24 hours have been reviewed.    Significant Imaging: I have reviewed all pertinent imaging results/findings within the past 24 hours.

## 2020-01-05 NOTE — ANESTHESIA POST-OP PAIN MANAGEMENT
Acute Pain Service Progress Note    Franny Arnold is a 89 y.o., female, [id number].    Surgery:  INSERTION, INTRAMEDULLARY ZAYNAB (Left Leg Upper)     Post Op Day #: 3     Catheter type: perineural  SIFI     Infusion type: Ropivacaine 0.2%  2 mL/hr basal w/ 10 mL/hr IB    Problem List:    Active Hospital Problems    Diagnosis  POA    *Closed displaced intertrochanteric fracture of left femur [S72.142A]  Yes    Confusion [R41.0]  Yes    Closed displaced fracture of surgical neck of left humerus [S42.212A]  Yes    Fracture of proximal end of left humerus [S42.202A]  Yes    Leukocytosis [D72.829]  Yes      Resolved Hospital Problems   No resolved problems to display.            General appearance of alert, oriented, no complaints              Pain with rest: 1    Numbers              Pain with movement: 3    Numbers              Side Effects                          1. Pruritis No                          2. Nausea No                          3. Motor Blockade No, 0=Ability to raise lower extremities off bed                          4. Sedation No, 1=awake and alert     Objective:                 Catheter site clean, dry, intact      Vitals   Vitals:    01/05/20 0313   BP:    Pulse: 91   Resp:    Temp:         Labs    CBC:   Recent Labs   Lab 01/02/20  1321 01/03/20  0137 01/04/20  0355   WBC 23.75* 15.19* 11.48   RBC 4.33 3.40* 3.14*   HGB 12.0 9.4* 8.5*   HCT 40.2 30.9* 28.8*    190 191   MCV 93 91 92   MCH 27.7 27.6 27.1   MCHC 29.9* 30.4* 29.5*     BMP:   Recent Labs   Lab 01/02/20  0355 01/03/20  0137 01/04/20  0354   GLU 97 90 82    140 143   K 3.9 4.0 3.9   * 113* 111*   CO2 22* 22* 25   BUN 19 22 15   CREATININE 0.8 0.9 0.8   CALCIUM 8.5* 8.1* 8.2*   MG 2.0 2.0 2.0     Coagulation:   Recent Labs   Lab 01/01/20  1527   INR 1.1        Meds   Current Facility-Administered Medications   Medication Dose Route Frequency Provider Last Rate Last Dose    bisacodyl suppository 10 mg  10 mg Rectal Daily  PRN Seng Vera MD        enoxaparin injection 40 mg  40 mg Subcutaneous Daily Hang Varghese MD   40 mg at 01/04/20 1731    melatonin tablet 6 mg  6 mg Oral Nightly PRN Seng Vera MD   6 mg at 01/04/20 0048    methocarbamol tablet 500 mg  500 mg Oral QID Kip Burnett MD   500 mg at 01/04/20 2202    mupirocin 2 % ointment 1 g  1 g Nasal BID Seng Vera MD   1 g at 01/04/20 2203    ondansetron injection 4 mg  4 mg Intravenous Q12H PRN Kip Burnett MD        polyethylene glycol packet 17 g  17 g Oral Daily Seng Vera MD   17 g at 01/04/20 0909    potassium, sodium phosphates 280-160-250 mg packet 1 packet  1 packet Oral QID (WM & HS) Kamini Collado MD   1 packet at 01/04/20 2203    promethazine (PHENERGAN) 6.25 mg in dextrose 5 % 50 mL IVPB  6.25 mg Intravenous Q6H PRN Kip Burnett MD        ropivacaine (PF) 2 mg/ml (0.2%) infusion  2 mL/hr Perineural Continuous Kip Burnett MD 2 mL/hr at 01/05/20 0016 2 mL/hr at 01/05/20 0016    sodium chloride 0.9% flush 10 mL  10 mL Intravenous PRN Alejandro Maloney DO        sodium chloride 0.9% flush 10 mL  10 mL Intravenous PRN Seng Vera MD        sodium chloride 0.9% flush 10 mL  10 mL Intravenous PRN Elias Rolon MD        vitamin D 1000 units tablet 2,000 Units  2,000 Units Oral Daily Hang Varghese MD   2,000 Units at 01/04/20 0909        Anticoagulant dose: lovenox    Assessment:     Pain control adequate    Plan:     Patient doing well, continue present treatment.   - Pt slightly disoriented; recommend continuing delirium precautions   - Continue PNC at current rate.  - Continue multimodal analgesia with Tylenol and Robaxin.  - Encouraged PT/OT and mobility OOB.  - Dispo: per primary team, PT recommending skilled. Will continue PNC until placement     Evaluator Vincent Hicks

## 2020-01-05 NOTE — PROGRESS NOTES
[facility] Hospital Medicine  Progress Note    Patient Name: Franny Arnold  MRN: [id number]  Patient Class: IP- Inpatient   Admission Date: 1/1/2020  Length of Stay: 4 days  Attending Physician: Kamini Collado MD  Primary Care Provider: Dinesh Wylie MD    [facility] Medicine Team: [facility] HOSP MED  Kamini Collado MD    Subjective:     Principal Problem:Closed displaced intertrochanteric fracture of left femur        HPI:  No notes on file    Overview/Hospital Course:  No notes on file    Interval History: No acute issues. Intermittent confusion.     Review of Systems   Constitutional: Negative for chills and fever.   HENT: Negative.    Eyes: Negative for visual disturbance.   Respiratory: Negative for cough and shortness of breath.    Cardiovascular: Negative for chest pain and palpitations.   Gastrointestinal: Negative for abdominal pain, diarrhea, nausea and vomiting.   Genitourinary: Negative for difficulty urinating, frequency and hematuria.   Neurological: Negative for light-headedness and headaches.   Psychiatric/Behavioral: The patient is not nervous/anxious.      Objective:     Vital Signs (Most Recent):  Temp: 96.8 °F (36 °C) (01/05/20 1104)  Pulse: 80 (01/05/20 1104)  Resp: 18 (01/05/20 1104)  BP: 124/85 (01/05/20 1104)  SpO2: (!) 93 % (01/05/20 1104) Vital Signs (24h Range):  Temp:  [96.8 °F (36 °C)-99.5 °F (37.5 °C)] 96.8 °F (36 °C)  Pulse:  [80-91] 80  Resp:  [16-18] 18  SpO2:  [92 %-95 %] 93 %  BP: ()/(59-85) 124/85     Weight: 54.4 kg (120 lb)  Body mass index is 20.6 kg/m².    Intake/Output Summary (Last 24 hours) at 1/5/2020 1353  Last data filed at 1/4/2020 1800  Gross per 24 hour   Intake 480 ml   Output --   Net 480 ml      Physical Exam   Constitutional:   Thin elderly female lying comfortably in nad   HENT:   Head: Normocephalic and atraumatic.   Cardiovascular: Normal rate and regular rhythm.   Pulmonary/Chest: Effort normal. No respiratory distress.   Abdominal: Soft.  Bowel sounds are normal. She exhibits no distension.   Musculoskeletal: She exhibits edema (dressing and mild edema of LLE).   LUE in sling   Neurological: She is alert.   Oriented to person   calm   Vitals reviewed.      Significant Labs: All pertinent labs within the past 24 hours have been reviewed.    Significant Imaging: I have reviewed all pertinent imaging results/findings within the past 24 hours.      Assessment/Plan:      * Closed displaced intertrochanteric fracture of left femur  Sustained while trying to get out of bed  X-ray showed: Acute intertrochanteric fracture of the proximal left femur  S/P L femur CMN on 1/2/20  Pain management per anesthesia  WBAT to LLE  PTOT  DVT prophylaxis with lovenox   H&H slowly trended down but stable        Confusion  Intermittent confusion but stable  Niece noted to me that she does have some dementia / memory impairments   Will continue to monitor closely       Leukocytosis  Unclear etiology  UA not suggestive of UTI  CXR WNL   Afebrile   Will continue to monitor with daily CBCs  Trending down  Most likely stress response   Now resolved    Fracture of proximal end of left humerus  X-ray showed: Acute proximal left humeral head/neck fracture  Sustained after a fall   In sling and swath  NWB to LUE         VTE Risk Mitigation (From admission, onward)         Ordered     enoxaparin injection 40 mg  Daily      01/02/20 1259     IP VTE HIGH RISK PATIENT  Once      01/02/20 1253     Place sequential compression device  Until discontinued      01/02/20 0749     Place ADAIR hose  Until discontinued      01/02/20 0749     Place sequential compression device  Until discontinued      01/01/20 1815                      Kamini Collado MD  Department of Hospital Medicine   [facility]

## 2020-01-05 NOTE — PT/OT/SLP PROGRESS
Physical Therapy Treatment    Patient Name:  Franny Arnold   MRN:  [id number]    Recommendations:     Discharge Recommendations:  nursing facility, skilled   Discharge Equipment Recommendations: bedside commode, cane, quad, wheelchair   Barriers to discharge: Inaccessible home and Decreased caregiver support    Assessment:     Franny Arnold is a 89 y.o. female admitted with a medical diagnosis of Closed displaced intertrochanteric fracture of left femur.  She presents with the following impairments/functional limitations:  weakness, impaired endurance, impaired self care skills, impaired functional mobilty, gait instability, impaired balance, decreased safety awareness, decreased lower extremity function, decreased upper extremity function, decreased ROM, impaired joint extensibility, orthopedic precautions, pain . Patient was more oriented than yesterday and showed decreased retropulsion, but overall strength continues to be very limited, which limits functional mobility.    Rehab Prognosis: Fair; patient would benefit from acute skilled PT services to address these deficits and reach maximum level of function.    Recent Surgery: Procedure(s) (LRB):  INSERTION, INTRAMEDULLARY ZAYNAB (Left) 3 Days Post-Op    Plan:     During this hospitalization, patient to be seen daily to address the identified rehab impairments via gait training, therapeutic activities, therapeutic exercises, neuromuscular re-education and progress toward the following goals:    · Plan of Care Expires:  01/17/20    Subjective     Chief Complaint: pain with mobility.  Patient/Family Comments/goals: to go to SNF soon.  Pain/Comfort:  · Pain Rating 1: (Did not rate, but pain only noted with bed mobility.)  · Location - Side 1: Left  · Location - Orientation 1: generalized  · Location 1: leg  · Pain Addressed 1: Pre-medicate for activity, Reposition, Distraction, Cessation of Activity  · Pain Rating Post-Intervention 1: 0/10      Objective:      Communicated with nsg prior to session.  Patient found HOB elevated with bed alarm, FCD, perineural catheter, telemetry upon PT entry to room.     General Precautions: Standard, fall   Orthopedic Precautions:LUE non weight bearing, LLE weight bearing as tolerated   Braces: Sling and swathe     Functional Mobility:  · Bed Mobility:     · Scooting: total assistance  · Supine to Sit: total assistance  · Sit to Supine: total assistance  · Balance: fair in sitting.      AM-PAC 6 CLICK MOBILITY  Turning over in bed (including adjusting bedclothes, sheets and blankets)?: 2  Sitting down on and standing up from a chair with arms (e.g., wheelchair, bedside commode, etc.): 1  Moving from lying on back to sitting on the side of the bed?: 2  Moving to and from a bed to a chair (including a wheelchair)?: 1  Need to walk in hospital room?: 1  Climbing 3-5 steps with a railing?: 1  Basic Mobility Total Score: 8       Therapeutic Activities and Exercises:   Static sitting balance at EOB x 12 minutes with min to CGA. B LE gentle ROM x 30 reps on all available planes of motion.   Repositioned in bed, donned FCD's and elevated B heels on a pillow to prevent Decubitus.    Patient left HOB elevated with all lines intact, call button in reach and bed alarm on..    GOALS:   Multidisciplinary Problems     Physical Therapy Goals        Problem: Physical Therapy Goal    Goal Priority Disciplines Outcome Goal Variances Interventions   Physical Therapy Goal     PT, PT/OT Ongoing, Progressing     Description:  Goals to be met by: 20    Patient will increase functional independence with mobility by performin. Supine to sit with Stand-by Assistance  2. Sit to stand transfer with Minimal Assistance  3. Bed to chair transfer with Minimal Assistance using LRAD  4. Gait x10 feet with Minimal Assistance using LRAD  5. Lower extremity exercise program x10 reps per handout, with assistance as needed                      Time Tracking:      PT Received On: 01/05/20  PT Start Time: 1034     PT Stop Time: 1100  PT Total Time (min): 26 min     Billable Minutes: Therapeutic Exercise 15 and Neuromuscular Re-education 11    Treatment Type: Treatment  PT/PTA: PTA     PTA Visit Number: 2     Lul Mcdonough, PTA  01/05/2020

## 2020-01-05 NOTE — ASSESSMENT & PLAN NOTE
Noted some confusion today  Patient constantly stating that she came in this morning  Jose noted to me that she does have some dementia / memory impairments   Will continue to monitor closely

## 2020-01-05 NOTE — ASSESSMENT & PLAN NOTE
Sustained while trying to get out of bed  X-ray showed: Acute intertrochanteric fracture of the proximal left femur  S/P L femur CMN on 1/2/20  Pain management per anesthesia  WBAT to LLE  PTOT  DVT prophylaxis with lovenox   H&H slowly trended down but stable

## 2020-01-05 NOTE — ASSESSMENT & PLAN NOTE
Franny Arnold is a 89 y.o. female s/p L femur CMN on 1/2/20 with L prox humerus fx.      - Weight bearing status: WBAT LLE and NWB LUE  - Pain control: multimodal  - Antibiotics: none  - Hgb slight dip to 9.3 this am  - DVT Prophylaxis: lovenox , SCD's at all times when not ambulating.  - PT/OT  - Dispo: continue medical care with early mobilization, transfers with PT. Will continue to monitor mental confusion.

## 2020-01-05 NOTE — PLAN OF CARE
PT resting in bed. IV clean, dry, intact. fall precautions maintained no falls noted, call light in reach, bed locked, non skid socks on. while out of bed pt instructed to call for assistance, skin integrity maintained pt independent with positioning, c/o pain managed with prn meds/ PNC pump. Family at bedside. Tele-sitter in room and active. no other complaints or concerns, will cont to follow careplan

## 2020-01-06 LAB
ANION GAP SERPL CALC-SCNC: 10 MMOL/L (ref 8–16)
BACTERIA #/AREA URNS AUTO: ABNORMAL /HPF
BASOPHILS # BLD AUTO: 0.04 K/UL (ref 0–0.2)
BASOPHILS # BLD AUTO: 0.06 K/UL (ref 0–0.2)
BASOPHILS NFR BLD: 0.3 % (ref 0–1.9)
BASOPHILS NFR BLD: 0.4 % (ref 0–1.9)
BILIRUB UR QL STRIP: NEGATIVE
BUN SERPL-MCNC: 17 MG/DL (ref 10–30)
CALCIUM SERPL-MCNC: 9.1 MG/DL (ref 8.7–10.5)
CHLORIDE SERPL-SCNC: 108 MMOL/L (ref 95–110)
CLARITY UR REFRACT.AUTO: ABNORMAL
CO2 SERPL-SCNC: 22 MMOL/L (ref 23–29)
COLOR UR AUTO: YELLOW
CREAT SERPL-MCNC: 0.8 MG/DL (ref 0.5–1.4)
DIFFERENTIAL METHOD: ABNORMAL
DIFFERENTIAL METHOD: ABNORMAL
EOSINOPHIL # BLD AUTO: 0.1 K/UL (ref 0–0.5)
EOSINOPHIL # BLD AUTO: 0.1 K/UL (ref 0–0.5)
EOSINOPHIL NFR BLD: 0.5 % (ref 0–8)
EOSINOPHIL NFR BLD: 0.5 % (ref 0–8)
ERYTHROCYTE [DISTWIDTH] IN BLOOD BY AUTOMATED COUNT: 15.6 % (ref 11.5–14.5)
ERYTHROCYTE [DISTWIDTH] IN BLOOD BY AUTOMATED COUNT: 15.7 % (ref 11.5–14.5)
EST. GFR  (AFRICAN AMERICAN): >60 ML/MIN/1.73 M^2
EST. GFR  (NON AFRICAN AMERICAN): >60 ML/MIN/1.73 M^2
GLUCOSE SERPL-MCNC: 117 MG/DL (ref 70–110)
GLUCOSE UR QL STRIP: NEGATIVE
HCT VFR BLD AUTO: 32.9 % (ref 37–48.5)
HCT VFR BLD AUTO: 34.7 % (ref 37–48.5)
HGB BLD-MCNC: 10.1 G/DL (ref 12–16)
HGB BLD-MCNC: 10.6 G/DL (ref 12–16)
HGB UR QL STRIP: ABNORMAL
HYALINE CASTS UR QL AUTO: 0 /LPF
IMM GRANULOCYTES # BLD AUTO: 0.25 K/UL (ref 0–0.04)
IMM GRANULOCYTES # BLD AUTO: 0.27 K/UL (ref 0–0.04)
IMM GRANULOCYTES NFR BLD AUTO: 1.5 % (ref 0–0.5)
IMM GRANULOCYTES NFR BLD AUTO: 1.8 % (ref 0–0.5)
KETONES UR QL STRIP: NEGATIVE
LEUKOCYTE ESTERASE UR QL STRIP: ABNORMAL
LYMPHOCYTES # BLD AUTO: 1.8 K/UL (ref 1–4.8)
LYMPHOCYTES # BLD AUTO: 1.8 K/UL (ref 1–4.8)
LYMPHOCYTES NFR BLD: 10.7 % (ref 18–48)
LYMPHOCYTES NFR BLD: 12 % (ref 18–48)
MAGNESIUM SERPL-MCNC: 2 MG/DL (ref 1.6–2.6)
MCH RBC QN AUTO: 27.2 PG (ref 27–31)
MCH RBC QN AUTO: 27.3 PG (ref 27–31)
MCHC RBC AUTO-ENTMCNC: 30.5 G/DL (ref 32–36)
MCHC RBC AUTO-ENTMCNC: 30.7 G/DL (ref 32–36)
MCV RBC AUTO: 89 FL (ref 82–98)
MCV RBC AUTO: 89 FL (ref 82–98)
MICROSCOPIC COMMENT: ABNORMAL
MONOCYTES # BLD AUTO: 1.2 K/UL (ref 0.3–1)
MONOCYTES # BLD AUTO: 1.3 K/UL (ref 0.3–1)
MONOCYTES NFR BLD: 7.6 % (ref 4–15)
MONOCYTES NFR BLD: 8.2 % (ref 4–15)
NEUTROPHILS # BLD AUTO: 11.4 K/UL (ref 1.8–7.7)
NEUTROPHILS # BLD AUTO: 13.5 K/UL (ref 1.8–7.7)
NEUTROPHILS NFR BLD: 77.2 % (ref 38–73)
NEUTROPHILS NFR BLD: 79.3 % (ref 38–73)
NITRITE UR QL STRIP: NEGATIVE
NRBC BLD-RTO: 0 /100 WBC
NRBC BLD-RTO: 0 /100 WBC
PH UR STRIP: 6 [PH] (ref 5–8)
PHOSPHATE SERPL-MCNC: 3 MG/DL (ref 2.7–4.5)
PLATELET # BLD AUTO: 294 K/UL (ref 150–350)
PLATELET # BLD AUTO: 333 K/UL (ref 150–350)
PMV BLD AUTO: 9.3 FL (ref 9.2–12.9)
PMV BLD AUTO: 9.4 FL (ref 9.2–12.9)
POTASSIUM SERPL-SCNC: 4 MMOL/L (ref 3.5–5.1)
PROT UR QL STRIP: ABNORMAL
RBC # BLD AUTO: 3.71 M/UL (ref 4–5.4)
RBC # BLD AUTO: 3.88 M/UL (ref 4–5.4)
RBC #/AREA URNS AUTO: >100 /HPF (ref 0–4)
SODIUM SERPL-SCNC: 140 MMOL/L (ref 136–145)
SP GR UR STRIP: 1.01 (ref 1–1.03)
SQUAMOUS #/AREA URNS AUTO: 0 /HPF
URN SPEC COLLECT METH UR: ABNORMAL
WBC # BLD AUTO: 14.79 K/UL (ref 3.9–12.7)
WBC # BLD AUTO: 16.98 K/UL (ref 3.9–12.7)
WBC #/AREA URNS AUTO: 91 /HPF (ref 0–5)

## 2020-01-06 PROCEDURE — 85025 COMPLETE CBC W/AUTO DIFF WBC: CPT | Mod: 91

## 2020-01-06 PROCEDURE — 25000003 PHARM REV CODE 250: Performed by: STUDENT IN AN ORGANIZED HEALTH CARE EDUCATION/TRAINING PROGRAM

## 2020-01-06 PROCEDURE — 87077 CULTURE AEROBIC IDENTIFY: CPT

## 2020-01-06 PROCEDURE — 97530 THERAPEUTIC ACTIVITIES: CPT

## 2020-01-06 PROCEDURE — 80048 BASIC METABOLIC PNL TOTAL CA: CPT

## 2020-01-06 PROCEDURE — 97535 SELF CARE MNGMENT TRAINING: CPT

## 2020-01-06 PROCEDURE — 83735 ASSAY OF MAGNESIUM: CPT

## 2020-01-06 PROCEDURE — 84100 ASSAY OF PHOSPHORUS: CPT

## 2020-01-06 PROCEDURE — 87088 URINE BACTERIA CULTURE: CPT

## 2020-01-06 PROCEDURE — 87086 URINE CULTURE/COLONY COUNT: CPT

## 2020-01-06 PROCEDURE — 81001 URINALYSIS AUTO W/SCOPE: CPT

## 2020-01-06 PROCEDURE — 25000003 PHARM REV CODE 250: Performed by: HOSPITALIST

## 2020-01-06 PROCEDURE — 99232 PR SUBSEQUENT HOSPITAL CARE,LEVL II: ICD-10-PCS | Mod: ,,, | Performed by: HOSPITALIST

## 2020-01-06 PROCEDURE — 99232 SBSQ HOSP IP/OBS MODERATE 35: CPT | Mod: ,,, | Performed by: HOSPITALIST

## 2020-01-06 PROCEDURE — 63600175 PHARM REV CODE 636 W HCPCS: Performed by: HOSPITALIST

## 2020-01-06 PROCEDURE — 36415 COLL VENOUS BLD VENIPUNCTURE: CPT

## 2020-01-06 PROCEDURE — 87186 SC STD MICRODIL/AGAR DIL: CPT

## 2020-01-06 PROCEDURE — 11000001 HC ACUTE MED/SURG PRIVATE ROOM

## 2020-01-06 PROCEDURE — 63600175 PHARM REV CODE 636 W HCPCS: Performed by: STUDENT IN AN ORGANIZED HEALTH CARE EDUCATION/TRAINING PROGRAM

## 2020-01-06 RX ORDER — METHOCARBAMOL 500 MG/1
500 TABLET, FILM COATED ORAL 4 TIMES DAILY
Qty: 40 TABLET | Refills: 0 | Status: SHIPPED | OUTPATIENT
Start: 2020-01-06 | End: 2020-01-16

## 2020-01-06 RX ORDER — ENOXAPARIN SODIUM 100 MG/ML
40 INJECTION SUBCUTANEOUS DAILY
Qty: 12 ML | Refills: 0 | Status: SHIPPED | OUTPATIENT
Start: 2020-01-06 | End: 2020-02-05

## 2020-01-06 RX ORDER — CEFTRIAXONE 1 G/1
1 INJECTION, POWDER, FOR SOLUTION INTRAMUSCULAR; INTRAVENOUS
Status: DISCONTINUED | OUTPATIENT
Start: 2020-01-06 | End: 2020-01-08 | Stop reason: HOSPADM

## 2020-01-06 RX ADMIN — MUPIROCIN 1 G: 20 OINTMENT TOPICAL at 08:01

## 2020-01-06 RX ADMIN — METHOCARBAMOL TABLETS 500 MG: 500 TABLET, COATED ORAL at 04:01

## 2020-01-06 RX ADMIN — ACETAMINOPHEN 650 MG: 325 TABLET ORAL at 11:01

## 2020-01-06 RX ADMIN — ACETAMINOPHEN 650 MG: 325 TABLET ORAL at 12:01

## 2020-01-06 RX ADMIN — POLYETHYLENE GLYCOL 3350 17 G: 17 POWDER, FOR SOLUTION ORAL at 09:01

## 2020-01-06 RX ADMIN — ACETAMINOPHEN 650 MG: 325 TABLET ORAL at 06:01

## 2020-01-06 RX ADMIN — MELATONIN 2000 UNITS: at 09:01

## 2020-01-06 RX ADMIN — Medication 6 MG: at 11:01

## 2020-01-06 RX ADMIN — MUPIROCIN 1 G: 20 OINTMENT TOPICAL at 09:01

## 2020-01-06 RX ADMIN — METHOCARBAMOL TABLETS 500 MG: 500 TABLET, COATED ORAL at 09:01

## 2020-01-06 RX ADMIN — METHOCARBAMOL TABLETS 500 MG: 500 TABLET, COATED ORAL at 12:01

## 2020-01-06 RX ADMIN — CEFTRIAXONE SODIUM 1 G: 1 INJECTION, POWDER, FOR SOLUTION INTRAMUSCULAR; INTRAVENOUS at 11:01

## 2020-01-06 RX ADMIN — ENOXAPARIN SODIUM 40 MG: 100 INJECTION SUBCUTANEOUS at 04:01

## 2020-01-06 NOTE — PT/OT/SLP PROGRESS
Occupational Therapy   Treatment    Name: Franny Arnold  MRN: [id number]  Admitting Diagnosis:  Closed displaced intertrochanteric fracture of left femur  4 Days Post-Op    Recommendations:     Discharge Recommendations: nursing facility, skilled  Discharge Equipment Recommendations:  bedside commode, wheelchair  Barriers to discharge:  Decreased caregiver support    Assessment:     Franny Arnold is a 89 y.o. female with a medical diagnosis of Closed displaced intertrochanteric fracture of left femur.  She presents with the following Performance deficits affecting function are weakness, impaired endurance, impaired self care skills, gait instability, impaired functional mobilty, impaired balance, impaired cognition, decreased lower extremity function, decreased safety awareness, pain.     Pt pleasantly confused but agreeable to therapy. Pt. Requires Max A in bed mobility and t/f's due to pain, weakness, and decreased cognition. Pt requires Max v/c's for hand placement and NWB LUE. PT requires Total A to apolonia/adjust sling and swath. Pt required Max A in sit<>stand x2 trials but unable to clear bed. Continue OT POC.    Rehab Prognosis:  Good; patient would benefit from acute skilled OT services to address these deficits and reach maximum level of function.       Plan:     Patient to be seen 5 x/week to address the above listed problems via self-care/home management, therapeutic activities, therapeutic exercises  · Plan of Care Expires: 02/02/20  · Plan of Care Reviewed with: patient    Subjective     Pain/Comfort:  · Pain Rating 1: other (see comments)(pt did not rate)  · Location - Side 1: Left  · Location 1: leg  · Pain Addressed 1: Pre-medicate for activity, Reposition, Distraction, Cessation of Activity    Objective:     Communicated with: Rn prior to session.  Patient found supine with bed alarm, FCD, perineural catheter, telemetry upon OT entry to room.    General Precautions: Standard, fall   Orthopedic  Precautions:LLE weight bearing as tolerated, LUE non weight bearing   Braces: Sling and swathe     Occupational Performance:     Bed Mobility:    · Patient completed Supine to Sit with maximal assistance  · Patient completed Sit to Supine with maximal assistance     Functional Mobility/Transfers:  · Patient completed Sit <> Stand Transfer with maximal assistance  with  hand-held assist   · Functional Mobility: Pt. Requires Max A in bed mobility and t/f's due to pain, weakness, and decreased cognition. Pt requires Max v/c's for hand placement and NWB LUE. PT requires Total A to apolonia/adjust sling and swath. Pt required Max A in sit<>stand x2 trials but unable to clear bed. Continue OT POC.    Activities of Daily Living:  · Upper Body Dressing: total assistance apolonia gown under sling and swathe  · Toileting: total assistance for perineal hygiene      [facility] 6 Click ADL: 8    Treatment & Education:  - OT/POC-  - Importance of mobility to maximize recovery.  - Whiteboard updated  - safety w/ functional mobility; hand placement to ensure safe transfers to various surfaces in prep for ADLs  - OT provided education on how apolonia/doff sling and swathe  - Pt requires Max v/c's for NWB LUE.      Patient left supine with all lines intact, call button in reach and RN notifiedEducation:      GOALS:   Multidisciplinary Problems     Occupational Therapy Goals        Problem: Occupational Therapy Goal    Goal Priority Disciplines Outcome Interventions   Occupational Therapy Goal     OT, PT/OT Ongoing, Progressing    Description:  Goals to be met by: 2/2/2020     Patient will increase functional independence with ADLs by performing:    UE Dressing with Moderate Assistance.  LE Dressing with Maximum Assistance and Assistive Devices as needed.  Grooming while EOB with Minimal Assistance.  Toileting from bedside commode with Moderate Assistance for hygiene and clothing management.   Sitting at edge of bed x10 minutes with Contact Guard  Assistance.  Supine to sit with Moderate Assistance.  Toilet transfer to bedside commode with Maximum Assistance.                      Time Tracking:     OT Date of Treatment: 01/06/20  OT Start Time: 0954  OT Stop Time: 1024  OT Total Time (min): 30 min    Billable Minutes:Self Care/Home Management 15  Therapeutic Activity 15    Nimco Dominique OT  1/6/2020

## 2020-01-06 NOTE — PROGRESS NOTES
"[facility]  Orthopedics  Progress Note    Patient Name: Franny Arnold  MRN: [id number]  Admission Date: 1/1/2020  Hospital Length of Stay: 5 days  Attending Provider: Kamini Collado MD  Primary Care Provider: Dinesh Wylie MD  Follow-up For: Procedure(s) (LRB):  INSERTION, INTRAMEDULLARY ZAYNAB (Left)    Post-Operative Day: 4 Days Post-Op  Subjective:     Principal Problem:Closed displaced intertrochanteric fracture of left femur    Principal Orthopedic Problem: Same    Interval History: Patient seen and examined at bedside.  No acute events overnight,patient resting this am. Still with some confusion. Hgb stable at 10.1.   Review of patient's allergies indicates:  No Known Allergies    Current Facility-Administered Medications   Medication    acetaminophen tablet 650 mg    bisacodyl suppository 10 mg    enoxaparin injection 40 mg    melatonin tablet 6 mg    methocarbamol tablet 500 mg    mupirocin 2 % ointment 1 g    ondansetron injection 4 mg    polyethylene glycol packet 17 g    ropivacaine (PF) 2 mg/ml (0.2%) infusion    sodium chloride 0.9% flush 10 mL    sodium chloride 0.9% flush 10 mL    sodium chloride 0.9% flush 10 mL    vitamin D 1000 units tablet 2,000 Units     Objective:     Vital Signs (Most Recent):  Temp: 96.3 °F (35.7 °C) (01/06/20 0905)  Pulse: 89 (01/06/20 0905)  Resp: 18 (01/06/20 0905)  BP: 136/80 (01/06/20 0905)  SpO2: 96 % (01/06/20 0905) Vital Signs (24h Range):  Temp:  [96.3 °F (35.7 °C)-99.6 °F (37.6 °C)] 96.3 °F (35.7 °C)  Pulse:  [79-97] 89  Resp:  [18] 18  SpO2:  [92 %-96 %] 96 %  BP: (119-150)/(66-97) 136/80     Weight: 54.4 kg (120 lb)  Height: 5' 4" (162.6 cm)  Body mass index is 20.6 kg/m².    No intake or output data in the 24 hours ending 01/06/20 1023    Ortho/SPM Exam      Physical Exam:  NAD, A/O x 3.  Wound c/d/i with clean dressing.  No focal motor or sensory deficits noted.  LUE:   In sling and swath with ecchymosis  NVI      Significant Labs: "   CBC:   Recent Labs   Lab 01/05/20  0446 01/06/20  0510   WBC 10.23 14.79*   HGB 9.3* 10.1*   HCT 29.9* 32.9*    294     All pertinent labs within the past 24 hours have been reviewed.    Significant Imaging: I have reviewed and interpreted all pertinent imaging results/findings.    Assessment/Plan:     * Closed displaced intertrochanteric fracture of left femur  Franny Arnold is a 89 y.o. female s/p L femur CMN on 1/2/20 with L prox humerus fx.      - Weight bearing status: WBAT LLE and NWB KATELYN  - Pain control: multimodal  - Antibiotics: none  - Hgb 10.1  - DVT Prophylaxis: lovenox , SCD's at all times when not ambulating.  - PT/OT  - Dispo: continue medical care with early mobilization, transfers with PT. Likely discharge today.  Please keep sling and swathe in place as patient had removed this this morning            Fracture of proximal end of left humerus  Sling and swath, NWB KATELYN Varghese MD  Orthopedics  [facility]

## 2020-01-06 NOTE — ANESTHESIA POST-OP PAIN MANAGEMENT
PNC paused this morning.  Pt reports pain continues to remain well-controlled.  PNC removed with blue tip intact.  APS will sign off.

## 2020-01-06 NOTE — PLAN OF CARE
CM spoke with Dr. Collado regarding patient's pending d/c. Discharge to be held today due to elevated WBC on AM labs. Notified admission office at [facility] where patient to be admitted for skilled nursing care. Also notified nicci/Chela DEAN. Will follow-up in am.

## 2020-01-06 NOTE — PLAN OF CARE
Problem: Physical Therapy Goal  Goal: Physical Therapy Goal  Description  Goals to be met by: 20    Patient will increase functional independence with mobility by performin. Supine to sit with Stand-by Assistance  2. Sit to stand transfer with Minimal Assistance  3. Bed to chair transfer with Minimal Assistance using LRAD  4. Gait x10 feet with Minimal Assistance using LRAD  5. Lower extremity exercise program x10 reps per handout, with assistance as needed     Outcome: Ongoing, Progressing     Patient progressing appropriately towards current goals and plan of care remains appropriate at this time. Patient demonstrates appropriate activity tolerance with increased need to redirect patient secondary to confusion.  Patient oriented to person only and confusion limits comprehension of weight bearing status and safety.  Patient with posterior lean in both sitting and standing that improved in sitting position with increased duration.  Patient tolerated sitting edge of bed ~ 10 min with between CGA and mod assist.  Patient requires maximal assistance for bed mobility and transfers secondary to weakness, posterior lean and difficulty following directions.  Patient will benefit from further skilled PT for mobility training and strengthening to decrease burden of care and increase safety.      Osmar Mccrary, PT, DPT  2020  Pager #: [phone]

## 2020-01-06 NOTE — ADDENDUM NOTE
Addendum  created 01/06/20 0955 by Elizabeth Casillas MD    Charge Capture section accepted, Cosign clinical note with attestation

## 2020-01-06 NOTE — PLAN OF CARE
Alert to self, pt very confused entire night. Vitals WNL. scds on, safety measures in place, no complaints of pain, call bell with in reach. Telesitter in room. No falls at this time.

## 2020-01-06 NOTE — PLAN OF CARE
Patient scheduled     01/06/20 0952   Discharge Reassessment   Assessment Type Discharge Planning Reassessment   Provided patient/caregiver education on the expected discharge date and the discharge plan No   Do you have any problems affording any of your prescribed medications? No   Discharge Plan A Skilled Nursing Facility   Discharge Plan B Skilled Nursing Facility   DME Needed Upon Discharge  none   Patient choice form signed by patient/caregiver N/A   Anticipated Discharge Disposition SNF   Can the patient answer the patient profile reliably? Yes, cognitively intact   How does the patient rate their overall health at the present time? Good   Describe the patient's ability to walk at the present time. Does not walk or unable to take any steps at all   How often would a person be available to care for the patient? Whenever needed   Number of comorbid conditions (as recorded on the chart) None   During the past month, has the patient often been bothered by feeling down, depressed or hopeless? No   During the past month, has the patient often been bothered by little interest or pleasure in doing things? No   Post-Acute Status   Post-Acute Authorization Placement   Post-Acute Placement Status Awaiting Orders for SNF    to discharge to [facility] later today.

## 2020-01-06 NOTE — PLAN OF CARE
[facility]     Department of Hospital Medicine     [address] [place] [address]     [phone] after hours  [phone] fax       NURSING HOME ORDERS    01/08/2020    Admit to Nursing Home:     Skilled Bed                                                   Diagnoses:  Active Hospital Problems    Diagnosis  POA    *Closed displaced intertrochanteric fracture of left femur [S72.142A]  Yes    Acute cystitis [N30.00]  No    Confusion [R41.0]  Yes    Closed displaced fracture of surgical neck of left humerus [S42.212A]  Yes    Fracture of proximal end of left humerus [S42.202A]  Yes    Leukocytosis [D72.829]  Yes      Resolved Hospital Problems   No resolved problems to display.       Patient is homebound due to:  Closed displaced intertrochanteric fracture of left femur    Allergies:Review of patient's allergies indicates:  No Known Allergies    Vitals:      Every shift (Skilled Nursing patients)    Diet: regular     Acitivities:        - Weight bearing: WBAT to LLE and NWB to LUE. Keep sling and swathe to LUE in place at all times     LABS:  Per facility protocol    Nursing Precautions:     - Aspiration precautions:             - Total assistance with meals            -  Upright 90 degrees befor during and after meals             -  Suction at bedside          - Fall precautions per nursing home protocol   - Decubitus precautions:        -  for positioning   - Pressure reducing foam mattress   - Turn patient every two hours. Use wedge pillows to anchor patient    CONSULTS:        Physical Therapy to evaluate and treat     Occupational Therapy to evaluate and treat     Nutrition to evaluate and recommend diet        MISCELLANEOUS CARE:      Routine Skin for Bedridden Patients:  Apply moisture barrier cream to all skin folds and wet areas in perineal area daily and after baths and all bowel movements.        Medications: Discontinue all previous  medication orders, if any. See new list below.   Franny Arnold   Home Medication Instructions VANESSA:[id number]    Printed on:01/07/20 2243   Medication Information                      acetaminophen (TYLENOL) 325 MG tablet  Take 2 tablets (650 mg total) by mouth every 6 (six) hours as needed for Pain.             ciprofloxacin HCl (CIPRO) 500 MG tablet  Take 0.5 tablets (250 mg total) by mouth every 12 (twelve) hours. for 7 days  End date 1/14/2020             enoxaparin (LOVENOX) 40 mg/0.4 mL Syrg  Inject 0.4 mLs (40 mg total) into the skin once daily.  End date 1.31.2020           methocarbamol (ROBAXIN) 500 MG Tab  Take 1 tablet (500 mg total) by mouth 4 (four) times daily. for 10 days             polyethylene glycol (GLYCOLAX) 17 gram PwPk  Take 17 g by mouth 2 (two) times daily as needed (if no BM x 2 days).             vitamin D (VITAMIN D3) 2,000 unit Tab  Take 1 tablet (2,000 Units total) by mouth once daily.                       _________________________________  Ann Arriaga MD  01/08/2020

## 2020-01-06 NOTE — SUBJECTIVE & OBJECTIVE
"Principal Problem:Closed displaced intertrochanteric fracture of left femur    Principal Orthopedic Problem: Same    Interval History: Patient seen and examined at bedside.  No acute events overnight,patient resting this am. Still with some confusion. Hgb stable at 10.1.   Review of patient's allergies indicates:  No Known Allergies    Current Facility-Administered Medications   Medication    acetaminophen tablet 650 mg    bisacodyl suppository 10 mg    enoxaparin injection 40 mg    melatonin tablet 6 mg    methocarbamol tablet 500 mg    mupirocin 2 % ointment 1 g    ondansetron injection 4 mg    polyethylene glycol packet 17 g    ropivacaine (PF) 2 mg/ml (0.2%) infusion    sodium chloride 0.9% flush 10 mL    sodium chloride 0.9% flush 10 mL    sodium chloride 0.9% flush 10 mL    vitamin D 1000 units tablet 2,000 Units     Objective:     Vital Signs (Most Recent):  Temp: 96.3 °F (35.7 °C) (01/06/20 0905)  Pulse: 89 (01/06/20 0905)  Resp: 18 (01/06/20 0905)  BP: 136/80 (01/06/20 0905)  SpO2: 96 % (01/06/20 0905) Vital Signs (24h Range):  Temp:  [96.3 °F (35.7 °C)-99.6 °F (37.6 °C)] 96.3 °F (35.7 °C)  Pulse:  [79-97] 89  Resp:  [18] 18  SpO2:  [92 %-96 %] 96 %  BP: (119-150)/(66-97) 136/80     Weight: 54.4 kg (120 lb)  Height: 5' 4" (162.6 cm)  Body mass index is 20.6 kg/m².    No intake or output data in the 24 hours ending 01/06/20 1023    Ortho/SPM Exam      Physical Exam:  NAD, A/O x 3.  Wound c/d/i with clean dressing.  No focal motor or sensory deficits noted.  LUE:   In sling and swath with ecchymosis  NVI      Significant Labs:   CBC:   Recent Labs   Lab 01/05/20  0446 01/06/20  0510   WBC 10.23 14.79*   HGB 9.3* 10.1*   HCT 29.9* 32.9*    294     All pertinent labs within the past 24 hours have been reviewed.    Significant Imaging: I have reviewed and interpreted all pertinent imaging results/findings.  "

## 2020-01-06 NOTE — PLAN OF CARE
ART spoke with Patient's niece and JERMAINE York regarding patient's acceptance to [facility] for skilled nursing care. Nidary has agree to proceed with admission to [facility] today. Dr. Collado to write orders as patient is medically stable for discharge. Spoke with Reji at NH and jossydary scheduled to sign paper work at 11am. Will send facility updated progress notes and labs as requested.

## 2020-01-06 NOTE — ASSESSMENT & PLAN NOTE
Franny Arnold is a 89 y.o. female s/p L femur CMN on 1/2/20 with L prox humerus fx.      - Weight bearing status: WBAT LLE and NWB LUE  - Pain control: multimodal  - Antibiotics: none  - Hgb 10.1  - DVT Prophylaxis: lovenox , SCD's at all times when not ambulating.  - PT/OT  - Dispo: continue medical care with early mobilization, transfers with PT. Likely discharge today.  Please keep sling and swathe in place as patient had removed this this morning

## 2020-01-06 NOTE — PT/OT/SLP PROGRESS
Physical Therapy Treatment    Patient Name:  Franny Arnold   MRN:  [id number]    Recommendations:     Discharge Recommendations:  nursing facility, skilled   Discharge Equipment Recommendations: bedside commode, wheelchair   Barriers to discharge: None    Assessment:     Franny Arnold is a 89 y.o. female admitted with a medical diagnosis of Closed displaced intertrochanteric fracture of left femur.  She presents with the following impairments/functional limitations:  weakness, impaired endurance, impaired functional mobilty, impaired cognition, decreased safety awareness, gait instability, pain, impaired balance, decreased lower extremity function, impaired self care skills, orthopedic precautions, decreased upper extremity function, decreased ROM.      Patient demonstrates appropriate activity tolerance with increased need to redirect patient secondary to confusion.  Patient oriented to person only and confusion limits comprehension of weight bearing status and safety.  Patient with posterior lean in both sitting and standing that improved in sitting position with increased duration.  Patient tolerated sitting edge of bed ~ 10 min with between CGA and mod assist.  Patient requires maximal assistance for bed mobility and transfers secondary to weakness, posterior lean and difficulty following directions.  Patient will benefit from further skilled PT for mobility training and strengthening to decrease burden of care and increase safety.      Rehab Prognosis: Fair; patient continues to benefit from acute skilled PT services to address these deficits and reach maximum level of function.  Patient remains most appropriate to discharge to nursing facility, skilled  Recent Surgery: Procedure(s) (LRB):  INSERTION, INTRAMEDULLARY ZAYNAB (Left) 4 Days Post-Op    Plan:     During this hospitalization, patient to be seen 5 x/week to address the identified rehab impairments via gait training, therapeutic activities, therapeutic  "exercises, neuromuscular re-education, wheelchair management/training and progress toward the following goals:    · Plan of Care Expires:  01/17/20    Subjective     Subjective: "My father's name is also Jeremy."   Pain/Comfort:  · Pain Rating 1: (unable to provide pain rating secondary to confusion)  · Location - Side 1: Left  · Location - Orientation 1: generalized  · Location 1: hip  · Pain Addressed 1: Reposition, Distraction, Cessation of Activity  · Location - Side 2: Left  · Location - Orientation 2: generalized  · Location 2: shoulder  · Pain Addressed 2: Reposition, Distraction, Cessation of Activity      Objective:     Communicated with RN prior to session.  Patient found supine with (tele-sitter) upon PT entry to room.     General Precautions: Standard, fall   Orthopedic Precautions:LLE weight bearing as tolerated, LUE non weight bearing   Braces: N/A     Functional Mobility:  · Bed Mobility:     · Supine to Sit: maximal assistance secondary to retropulsion.  Cues needed for L UE weight bearing status  · Sit to Supine: maximal assistance secondary to pain  · Transfers:     · Sit to Stand:  maximal assistance with hand-held assist patient with increased posterior and lean to R secondary to pain and decreased midline orientation.  · Gait: unable to ambulate at this time.  · Balance: Patient tolerated sitting edge of bed with between CGA and moderate assistance.  Posterior lean improved with increased time sitting.        AM-PAC 6 CLICK MOBILITY  Turning over in bed (including adjusting bedclothes, sheets and blankets)?: 2  Sitting down on and standing up from a chair with arms (e.g., wheelchair, bedside commode, etc.): 2  Moving from lying on back to sitting on the side of the bed?: 2  Moving to and from a bed to a chair (including a wheelchair)?: 1  Need to walk in hospital room?: 1  Climbing 3-5 steps with a railing?: 1  Basic Mobility Total Score: 9       Therapeutic Activities and Exercises:  Patient " required increased cues for weight bearing status and required increased rolling in bed following stress incontinence in stance.  Patient with greater difficulty rolling to L than R secondary to pain.      Patient Education:    Patient educated on bed mobility training, Fall risk, home safety and weight bearing restrictions by explanation.  Patient was confusion limiting to education and needs reinforcement.     Patient left supine with all lines intact, call button in reach and tele-sitter in place.    GOALS:   Multidisciplinary Problems     Physical Therapy Goals        Problem: Physical Therapy Goal    Goal Priority Disciplines Outcome Goal Variances Interventions   Physical Therapy Goal     PT, PT/OT Ongoing, Progressing     Description:  Goals to be met by: 20    Patient will increase functional independence with mobility by performin. Supine to sit with Stand-by Assistance  2. Sit to stand transfer with Minimal Assistance  3. Bed to chair transfer with Minimal Assistance using LRAD  4. Gait x10 feet with Minimal Assistance using LRAD  5. Lower extremity exercise program x10 reps per handout, with assistance as needed                      Time Tracking:     PT Received On: 20  PT Start Time: 0307     PT Stop Time: 0348  PT Total Time (min): 41 min     Billable Minutes: Therapeutic Activity 41 min    Treatment Type: Treatment  PT/PTA: PT     PTA Visit Number: 0     Osmar Mccrary, PT  2020

## 2020-01-06 NOTE — ANESTHESIA POST-OP PAIN MANAGEMENT
Acute Pain Service Progress Note    Franny Arnold is a 89 y.o., female, [id number].    Surgery:  INSERTION, INTRAMEDULLARY ZAYNAB (Left Leg Upper)     Post Op Day #: 4     Catheter type: perineural  SIFI     Infusion type: Ropivacaine 0.2%  2 mL/hr basal w/ 10 mL/hr IB       Problem List:    Active Hospital Problems    Diagnosis  POA    *Closed displaced intertrochanteric fracture of left femur [S72.142A]  Yes    Confusion [R41.0]  Yes    Closed displaced fracture of surgical neck of left humerus [S42.212A]  Yes    Fracture of proximal end of left humerus [S42.202A]  Yes    Leukocytosis [D72.829]  Yes      Resolved Hospital Problems   No resolved problems to display.       Subjective:     General appearance of alert, intermittently confused, no complaints   Pain with rest: 1    Numbers   Pain with movement: 3    Numbers   Side Effects    1. Pruritis No    2. Nausea No    3. Motor Blockade No, 0=Ability to raise lower extremities off bed    4. Sedation No, 1=awake and alert    Objective:     Catheter site clean, dry, intact      Vitals   Vitals:    01/06/20 0608   BP: 122/70   Pulse: 79   Resp: 18   Temp: 36.6 °C (97.8 °F)        Labs    No results displayed because visit has over 200 results.           Meds   Current Facility-Administered Medications   Medication Dose Route Frequency Provider Last Rate Last Dose    acetaminophen tablet 650 mg  650 mg Oral Q6H Vincent Hicks MD   650 mg at 01/05/20 1719    bisacodyl suppository 10 mg  10 mg Rectal Daily PRN Seng Vera MD        enoxaparin injection 40 mg  40 mg Subcutaneous Daily Hang Varghese MD   40 mg at 01/05/20 1719    melatonin tablet 6 mg  6 mg Oral Nightly PRN Seng Vera MD   6 mg at 01/05/20 2206    methocarbamol tablet 500 mg  500 mg Oral QID Kip Burnett MD   500 mg at 01/05/20 2103    mupirocin 2 % ointment 1 g  1 g Nasal BID Seng Vera MD   1 g at 01/05/20 2103    ondansetron injection 4 mg  4 mg Intravenous Q12H PRN  Kip Burnett MD        polyethylene glycol packet 17 g  17 g Oral Daily Seng Vera MD   17 g at 01/05/20 0842    ropivacaine (PF) 2 mg/ml (0.2%) infusion  2 mL/hr Perineural Continuous Kip Burnett MD 2 mL/hr at 01/05/20 2340 2 mL/hr at 01/05/20 2340    sodium chloride 0.9% flush 10 mL  10 mL Intravenous PRN Alejandro Maloney DO        sodium chloride 0.9% flush 10 mL  10 mL Intravenous PRN Seng Vera MD        sodium chloride 0.9% flush 10 mL  10 mL Intravenous PRN Elias Rolon MD        vitamin D 1000 units tablet 2,000 Units  2,000 Units Oral Daily Hang Varghese MD   2,000 Units at 01/05/20 0843        Anticoagulant dose Lovenox.    Assessment:     Pain control adequate.    Plan:    - Pt doing well.  - Pt slightly disoriented; recommend continuing delirium precautions   - PNC paused this morning.  Will likely pull later.  - Continue multimodal analgesia with Tylenol and Robaxin.  - Encouraged PT/OT and mobility OOB.  - Dispo: per primary team, PT recommending skilled.    Kip Burnett MD  PGY-2 / CA-1

## 2020-01-06 NOTE — ADDENDUM NOTE
Addendum  created 01/06/20 1301 by Kip Burnett MD    Intraprocedure Event edited, Sign clinical note

## 2020-01-06 NOTE — PLAN OF CARE
Pt pleasantly confused but agreeable to therapy. Pt. Requires Max A in bed mobility and t/f's due to pain, weakness, and decreased cognition. Pt requires Max v/c's for hand placement and NWB LUE. PT requires Total A to apolonia/adjust sling and swath. Pt required Max A in sit<>stand x2 trials but unable to clear bed. Continue OT POC.      Problem: Occupational Therapy Goal  Goal: Occupational Therapy Goal  Description  Goals to be met by: 2/2/2020     Patient will increase functional independence with ADLs by performing:    UE Dressing with Moderate Assistance.  LE Dressing with Maximum Assistance and Assistive Devices as needed.  Grooming while EOB with Minimal Assistance.  Toileting from bedside commode with Moderate Assistance for hygiene and clothing management.   Sitting at edge of bed x10 minutes with Contact Guard Assistance.  Supine to sit with Moderate Assistance.  Toilet transfer to bedside commode with Maximum Assistance.     Outcome: Ongoing, Progressing

## 2020-01-07 PROBLEM — N30.00 ACUTE CYSTITIS: Status: ACTIVE | Noted: 2020-01-07

## 2020-01-07 LAB
ANION GAP SERPL CALC-SCNC: 11 MMOL/L (ref 8–16)
ANISOCYTOSIS BLD QL SMEAR: SLIGHT
BASOPHILS # BLD AUTO: 0.04 K/UL (ref 0–0.2)
BASOPHILS NFR BLD: 0.2 % (ref 0–1.9)
BUN SERPL-MCNC: 26 MG/DL (ref 10–30)
CALCIUM SERPL-MCNC: 9.9 MG/DL (ref 8.7–10.5)
CHLORIDE SERPL-SCNC: 106 MMOL/L (ref 95–110)
CO2 SERPL-SCNC: 22 MMOL/L (ref 23–29)
CREAT SERPL-MCNC: 1.1 MG/DL (ref 0.5–1.4)
DIFFERENTIAL METHOD: ABNORMAL
EOSINOPHIL # BLD AUTO: 0.1 K/UL (ref 0–0.5)
EOSINOPHIL NFR BLD: 0.7 % (ref 0–8)
ERYTHROCYTE [DISTWIDTH] IN BLOOD BY AUTOMATED COUNT: 16.1 % (ref 11.5–14.5)
EST. GFR  (AFRICAN AMERICAN): 49.3 ML/MIN/1.73 M^2
EST. GFR  (NON AFRICAN AMERICAN): 42.8 ML/MIN/1.73 M^2
GLUCOSE SERPL-MCNC: 120 MG/DL (ref 70–110)
HCT VFR BLD AUTO: 33.1 % (ref 37–48.5)
HGB BLD-MCNC: 10.6 G/DL (ref 12–16)
HYPOCHROMIA BLD QL SMEAR: ABNORMAL
IMM GRANULOCYTES # BLD AUTO: 0.17 K/UL (ref 0–0.04)
IMM GRANULOCYTES NFR BLD AUTO: 0.8 % (ref 0–0.5)
LYMPHOCYTES # BLD AUTO: 1.9 K/UL (ref 1–4.8)
LYMPHOCYTES NFR BLD: 9.2 % (ref 18–48)
MAGNESIUM SERPL-MCNC: 2.2 MG/DL (ref 1.6–2.6)
MCH RBC QN AUTO: 28.3 PG (ref 27–31)
MCHC RBC AUTO-ENTMCNC: 32 G/DL (ref 32–36)
MCV RBC AUTO: 89 FL (ref 82–98)
MONOCYTES # BLD AUTO: 1.6 K/UL (ref 0.3–1)
MONOCYTES NFR BLD: 7.6 % (ref 4–15)
NEUTROPHILS # BLD AUTO: 16.9 K/UL (ref 1.8–7.7)
NEUTROPHILS NFR BLD: 81.5 % (ref 38–73)
NRBC BLD-RTO: 0 /100 WBC
OVALOCYTES BLD QL SMEAR: ABNORMAL
PHOSPHATE SERPL-MCNC: 4.1 MG/DL (ref 2.7–4.5)
PLATELET # BLD AUTO: 339 K/UL (ref 150–350)
PMV BLD AUTO: ABNORMAL FL (ref 9.2–12.9)
POIKILOCYTOSIS BLD QL SMEAR: SLIGHT
POLYCHROMASIA BLD QL SMEAR: ABNORMAL
POTASSIUM SERPL-SCNC: 4.1 MMOL/L (ref 3.5–5.1)
RBC # BLD AUTO: 3.74 M/UL (ref 4–5.4)
SODIUM SERPL-SCNC: 139 MMOL/L (ref 136–145)
TROPONIN I SERPL DL<=0.01 NG/ML-MCNC: 0.01 NG/ML (ref 0–0.03)
WBC # BLD AUTO: 20.76 K/UL (ref 3.9–12.7)

## 2020-01-07 PROCEDURE — 63600175 PHARM REV CODE 636 W HCPCS: Performed by: HOSPITALIST

## 2020-01-07 PROCEDURE — 36415 COLL VENOUS BLD VENIPUNCTURE: CPT

## 2020-01-07 PROCEDURE — 97110 THERAPEUTIC EXERCISES: CPT

## 2020-01-07 PROCEDURE — 25000003 PHARM REV CODE 250: Performed by: STUDENT IN AN ORGANIZED HEALTH CARE EDUCATION/TRAINING PROGRAM

## 2020-01-07 PROCEDURE — 84100 ASSAY OF PHOSPHORUS: CPT

## 2020-01-07 PROCEDURE — 85025 COMPLETE CBC W/AUTO DIFF WBC: CPT

## 2020-01-07 PROCEDURE — 83735 ASSAY OF MAGNESIUM: CPT

## 2020-01-07 PROCEDURE — 99232 SBSQ HOSP IP/OBS MODERATE 35: CPT | Mod: ,,, | Performed by: HOSPITALIST

## 2020-01-07 PROCEDURE — 93010 ELECTROCARDIOGRAM REPORT: CPT | Mod: ,,, | Performed by: INTERNAL MEDICINE

## 2020-01-07 PROCEDURE — 80048 BASIC METABOLIC PNL TOTAL CA: CPT

## 2020-01-07 PROCEDURE — 84484 ASSAY OF TROPONIN QUANT: CPT

## 2020-01-07 PROCEDURE — 93005 ELECTROCARDIOGRAM TRACING: CPT

## 2020-01-07 PROCEDURE — 25000003 PHARM REV CODE 250: Performed by: HOSPITALIST

## 2020-01-07 PROCEDURE — 93010 EKG 12-LEAD: ICD-10-PCS | Mod: ,,, | Performed by: INTERNAL MEDICINE

## 2020-01-07 PROCEDURE — 11000001 HC ACUTE MED/SURG PRIVATE ROOM

## 2020-01-07 PROCEDURE — 99232 PR SUBSEQUENT HOSPITAL CARE,LEVL II: ICD-10-PCS | Mod: ,,, | Performed by: HOSPITALIST

## 2020-01-07 PROCEDURE — 97530 THERAPEUTIC ACTIVITIES: CPT

## 2020-01-07 PROCEDURE — 63600175 PHARM REV CODE 636 W HCPCS: Performed by: STUDENT IN AN ORGANIZED HEALTH CARE EDUCATION/TRAINING PROGRAM

## 2020-01-07 RX ORDER — POLYETHYLENE GLYCOL 3350 17 G/17G
17 POWDER, FOR SOLUTION ORAL 2 TIMES DAILY PRN
Refills: 0
Start: 2020-01-07

## 2020-01-07 RX ORDER — ACETAMINOPHEN 325 MG/1
650 TABLET ORAL EVERY 6 HOURS PRN
Refills: 0
Start: 2020-01-07

## 2020-01-07 RX ORDER — SODIUM CHLORIDE 9 MG/ML
INJECTION, SOLUTION INTRAVENOUS CONTINUOUS
Status: DISCONTINUED | OUTPATIENT
Start: 2020-01-07 | End: 2020-01-08 | Stop reason: HOSPADM

## 2020-01-07 RX ORDER — CIPROFLOXACIN 500 MG/1
250 TABLET ORAL EVERY 12 HOURS
Qty: 7 TABLET | Refills: 0
Start: 2020-01-07 | End: 2020-01-14

## 2020-01-07 RX ORDER — METHOCARBAMOL 500 MG/1
500 TABLET, FILM COATED ORAL 2 TIMES DAILY
Status: DISCONTINUED | OUTPATIENT
Start: 2020-01-07 | End: 2020-01-08 | Stop reason: HOSPADM

## 2020-01-07 RX ORDER — CHOLECALCIFEROL (VITAMIN D3) 50 MCG
2000 TABLET ORAL DAILY
Start: 2020-01-07

## 2020-01-07 RX ADMIN — ACETAMINOPHEN 650 MG: 325 TABLET ORAL at 12:01

## 2020-01-07 RX ADMIN — ACETAMINOPHEN 650 MG: 325 TABLET ORAL at 05:01

## 2020-01-07 RX ADMIN — SODIUM CHLORIDE: 0.9 INJECTION, SOLUTION INTRAVENOUS at 10:01

## 2020-01-07 RX ADMIN — ACETAMINOPHEN 650 MG: 325 TABLET ORAL at 06:01

## 2020-01-07 RX ADMIN — SODIUM CHLORIDE: 0.9 INJECTION, SOLUTION INTRAVENOUS at 07:01

## 2020-01-07 RX ADMIN — ACETAMINOPHEN 650 MG: 325 TABLET ORAL at 11:01

## 2020-01-07 RX ADMIN — METHOCARBAMOL TABLETS 500 MG: 500 TABLET, COATED ORAL at 10:01

## 2020-01-07 RX ADMIN — MELATONIN 2000 UNITS: at 10:01

## 2020-01-07 RX ADMIN — POLYETHYLENE GLYCOL 3350 17 G: 17 POWDER, FOR SOLUTION ORAL at 10:01

## 2020-01-07 RX ADMIN — CEFTRIAXONE SODIUM 1 G: 1 INJECTION, POWDER, FOR SOLUTION INTRAMUSCULAR; INTRAVENOUS at 10:01

## 2020-01-07 RX ADMIN — ENOXAPARIN SODIUM 40 MG: 100 INJECTION SUBCUTANEOUS at 06:01

## 2020-01-07 RX ADMIN — Medication 6 MG: at 10:01

## 2020-01-07 NOTE — PLAN OF CARE
Bruno spoke with Dr. Arriaga. Patient will be monitored inpatient another day. Notified [facility] that patient's discharge will be 1/8 pending medical stability. Jose York at bedside. Will follow-up on discharge needs.

## 2020-01-07 NOTE — PT/OT/SLP PROGRESS
Occupational Therapy   Treatment    Name: Franny Arnold  MRN: [id number]  Admitting Diagnosis:  Closed displaced intertrochanteric fracture of left femur  5 Days Post-Op    Recommendations:     Discharge Recommendations: nursing facility, skilled  Discharge Equipment Recommendations:  bedside commode, wheelchair  Barriers to discharge:  Decreased caregiver support    Assessment:     Franny Arnold is a 89 y.o. female with a medical diagnosis of Closed displaced intertrochanteric fracture of left femur.  She presents with the following performance deficits affecting function:  weakness, impaired endurance, gait instability, impaired functional mobilty, impaired self care skills, impaired balance, impaired cognition, decreased lower extremity function, pain, decreased safety awareness, impaired coordination, orthopedic precautions, impaired skin, decreased ROM, decreased upper extremity function.   Pt tolerated session fairly well. Pt remains confused but agreeable to therapy. Pt did not c/o pain but is limited by her fatigue, generalized weakness, and decreased activity tolerance. She continues to require increased assistance for all functional tasks at this time. Pt remains able to sit EOB w/ Min A, at times progressing to CGA, while participating in L UE ROM exercises of the elbow, wrist, and hand. Pt's fatigue began to increase and became less participative. Pt returned to supine and repositioned comfortably. Once in supine, pt able to respond and answer questions. She continues to benefit from skilled OT to address the above listed impairments.     Rehab Prognosis:  Fair; patient would benefit from acute skilled OT services to address these deficits and reach maximum level of function.       Plan:     Patient to be seen 5 x/week to address the above listed problems via self-care/home management, therapeutic activities, therapeutic exercises  · Plan of Care Expires: 02/02/20  · Plan of Care Reviewed with:  patient    Subjective     Pain/Comfort:  · Pain Rating 1: (unable to provide 2/2 confusion )  · Location - Side 1: Left  · Location - Orientation 1: generalized  · Location 1: hip  · Pain Addressed 1: Reposition, Cessation of Activity, Distraction  · Pain Rating Post-Intervention 1: 0/10    Objective:     Communicated with: RN prior to session.  Patient found HOB elevated with PureWick, telemetry, oxygen, peripheral IV(telesitter) upon OT entry to room.    General Precautions: Standard, fall   Orthopedic Precautions:LUE non weight bearing, LLE weight bearing as tolerated   Braces: N/A     Occupational Performance:     Bed Mobility:    · Patient completed Scooting/Bridging with maximal assistance and 2 persons  · Patient completed Supine to Sit with maximal assistance and 2 persons  · Patient completed Sit to Supine with maximal assistance and 2 persons     Functional Mobility/Transfers:  · Unable at this time       [facility] 6 Click ADL: 8    Treatment & Education:  - OT POC  - Importance of EOB activity to maximize recovery   - re-ed pt and educated niece on (L) UE ROM (elbow, wrist, and hand) to maintain ROM and decrease stiffness  - L UE sling readjusted and niece educated on importance of functional and comfortable postioning     Patient left HOB elevated with all lines intact, call button in reach, RN notified and niece presentEducation:      GOALS:   Multidisciplinary Problems     Occupational Therapy Goals        Problem: Occupational Therapy Goal    Goal Priority Disciplines Outcome Interventions   Occupational Therapy Goal     OT, PT/OT Ongoing, Progressing    Description:  Goals to be met by: 2/2/2020     Patient will increase functional independence with ADLs by performing:    UE Dressing with Moderate Assistance.  LE Dressing with Maximum Assistance and Assistive Devices as needed.  Grooming while EOB with Minimal Assistance.  Toileting from bedside commode with Moderate Assistance for hygiene and clothing  management.   Sitting at edge of bed x10 minutes with Contact Guard Assistance.  Supine to sit with Moderate Assistance.  Toilet transfer to bedside commode with Maximum Assistance.                      Time Tracking:     OT Date of Treatment: 01/07/20  OT Start Time: 1511  OT Stop Time: 1536  OT Total Time (min): 25 min    Billable Minutes:Therapeutic Exercise 25    Neha Pace, OT  1/7/2020

## 2020-01-07 NOTE — NURSING
Jemma cohen on call to notify of ekg findings. Pt stable but remains confused. No c/o cp at this time. Will continue to monitor.

## 2020-01-07 NOTE — ASSESSMENT & PLAN NOTE
Unclear etiology of initial leukocytosis which resolved on admit, probably that was from stress response and rhabdo present on admit.  Initial UA and CXR negative on admit.  New leukocytosis noted 1/6 and UA consistent with UTI. Urine dark, cloudy in purewick. No hx of UTIs per family  -WBC 20 and some encephalopathy, continue to trend. On rocehpin now, plan to likely change to cipro on dc renal dose given age to complete 1 week therapy considering recent catheter use.  -CX pending  Afebrile, but given WBC still up, low threshold if fevers to broaden to zosyn. No reason to suspect resistance based on never having UTI in the past though

## 2020-01-07 NOTE — PLAN OF CARE
Problem: Physical Therapy Goal  Goal: Physical Therapy Goal  Description  Goals to be met by: 20    Patient will increase functional independence with mobility by performin. Supine to sit with Stand-by Assistance  2. Sit to stand transfer with Minimal Assistance  3. Bed to chair transfer with Minimal Assistance using LRAD  4. Gait x10 feet with Minimal Assistance using LRAD  5. Lower extremity exercise program x10 reps per handout, with assistance as needed     Outcome: Ongoing, Progressing     Patient progressing appropriately towards current goals and plan of care remains appropriate at this time. Patient demonstrates decreased activity tolerance secondary increased drowsiness.  Patient demonstrated decreased tolerance of sitting from previous session secondary to drowsiness and some lightheadedness.  Patient able to maintain sitting edge of bed ~ 8 min with between CGA and min A.  Patient continues to benefit from further skilled PT for strengthening and mobility training in an acute care and skilled nursing setting.      Osmar Mccrary, PT, DPT  2020  Pager #: [phone]

## 2020-01-07 NOTE — ASSESSMENT & PLAN NOTE
X-ray showed: Acute proximal left humeral head/neck fracture  Sustained in fall on admit.  In sling and swath, keep in place until ortho f/u at all times. dont let her pull on it, redirect.  ASHLEY ZEPEDA

## 2020-01-07 NOTE — ASSESSMENT & PLAN NOTE
Sustained while trying to get out of bed  X-ray showed: Acute intertrochanteric fracture of the proximal left femur  S/P L femur CMN on 1/2/20  Pain management per anesthesia  WBAT to LLE  PTOT  DVT prophylaxis with lovenox   H&H stable    Accepted to SNF when medially cleared

## 2020-01-07 NOTE — SUBJECTIVE & OBJECTIVE
Interval History:  Intermittent confusion persists. Leukocytosis noted today. Pain controlled.    Review of Systems   Constitutional: Negative for chills and fever.   HENT: Negative.    Respiratory: Negative for cough and shortness of breath.    Cardiovascular: Negative for chest pain and palpitations.   Gastrointestinal: Negative for abdominal pain, diarrhea, nausea and vomiting.   Genitourinary: Negative for difficulty urinating, frequency and hematuria.   Neurological: Negative for light-headedness and headaches.     Objective:     Vital Signs (Most Recent):  Temp: 97.8 °F (36.6 °C) (01/06/20 2021)  Pulse: 84 (01/06/20 2031)  Resp: 18 (01/06/20 2021)  BP: 118/72 (01/06/20 2021)  SpO2: (!) 92 % (01/06/20 2031) Vital Signs (24h Range):  Temp:  [96.2 °F (35.7 °C)-98.7 °F (37.1 °C)] 97.8 °F (36.6 °C)  Pulse:  [79-97] 84  Resp:  [18] 18  SpO2:  [92 %-96 %] 92 %  BP: (114-150)/(70-97) 118/72     Weight: 54.4 kg (120 lb)  Body mass index is 20.6 kg/m².    Intake/Output Summary (Last 24 hours) at 1/6/2020 2153  Last data filed at 1/6/2020 1830  Gross per 24 hour   Intake 480 ml   Output 100 ml   Net 380 ml      Physical Exam   Constitutional:   Thin elderly female lying comfortably   HENT:   Head: Normocephalic and atraumatic.   Cardiovascular: Normal rate and regular rhythm.   Pulmonary/Chest: Effort normal. No respiratory distress.   Abdominal: Soft. Bowel sounds are normal. She exhibits no distension.   Musculoskeletal: She exhibits edema (dressing and mild edema of LLE).   LUE in sling   Neurological: She is alert.   Oriented to person   calm   Vitals reviewed.      Significant Labs: All pertinent labs within the past 24 hours have been reviewed.    Significant Imaging: I have reviewed all pertinent imaging results/findings within the past 24 hours.

## 2020-01-07 NOTE — PLAN OF CARE
Pt continues to participate with therapy.       Problem: Occupational Therapy Goal  Goal: Occupational Therapy Goal  Description  Goals to be met by: 2/2/2020     Patient will increase functional independence with ADLs by performing:    UE Dressing with Moderate Assistance.  LE Dressing with Maximum Assistance and Assistive Devices as needed.  Grooming while EOB with Minimal Assistance.  Toileting from bedside commode with Moderate Assistance for hygiene and clothing management.   Sitting at edge of bed x10 minutes with Contact Guard Assistance.  Supine to sit with Moderate Assistance.  Toilet transfer to bedside commode with Maximum Assistance.     Outcome: Ongoing, Progressing

## 2020-01-07 NOTE — PT/OT/SLP PROGRESS
"Physical Therapy Treatment    Patient Name:  Franny Arnold   MRN:  [id number]    Recommendations:     Discharge Recommendations:  nursing facility, skilled   Discharge Equipment Recommendations: bedside commode, wheelchair   Barriers to discharge: Decreased caregiver support    Assessment:     Franny Arnold is a 89 y.o. female admitted with a medical diagnosis of Closed displaced intertrochanteric fracture of left femur.  She presents with the following impairments/functional limitations:  weakness, gait instability, decreased ROM, impaired joint extensibility, decreased lower extremity function, impaired balance, impaired endurance, decreased safety awareness, orthopedic precautions, pain, impaired self care skills, impaired functional mobilty.      Patient demonstrates decreased activity tolerance secondary increased drowsiness.  Patient demonstrated decreased tolerance of sitting from previous session secondary to drowsiness and some lightheadedness.  Patient able to maintain sitting edge of bed ~ 8 min with between CGA and min A.  Patient continues to benefit from further skilled PT for strengthening and mobility training in an acute care and skilled nursing setting.      Rehab Prognosis: Fair; patient continues to benefit from acute skilled PT services to address these deficits and reach maximum level of function.  Patient remains most appropriate to discharge to nursing facility, skilled  Recent Surgery: Procedure(s) (LRB):  INSERTION, INTRAMEDULLARY ZAYNAB (Left) 5 Days Post-Op    Plan:     During this hospitalization, patient to be seen 5 x/week to address the identified rehab impairments via gait training, therapeutic activities, therapeutic exercises, neuromuscular re-education, wheelchair management/training and progress toward the following goals:    · Plan of Care Expires:  02/02/20    Subjective     Subjective: "My head feels like a balloon."   Pain/Comfort:  · Pain Rating 1: (unable to rate secondary to " confusion)  · Location - Side 1: Left  · Location - Orientation 1: generalized  · Location 1: (hip and shoulder)  · Pain Addressed 1: Reposition, Cessation of Activity, Distraction      Objective:     Communicated with RN prior to session.  Patient found supine with PureWick, telemetry, oxygen, peripheral IV upon PT entry to room.     General Precautions: Standard, fall   Orthopedic Precautions:LUE non weight bearing, LLE weight bearing as tolerated   Braces: N/A     Functional Mobility:  · Bed Mobility:     · Supine to Sit: maximal assistance and of 2 persons  · Sit to Supine: maximal assistance and of 2 persons  · Transfers:  Unable to progress to stance at this time secondary to lightheadedness  · Balance: tolerated sitting edge of bed ~ 8 min with between CGA and min A      AM-PAC 6 CLICK MOBILITY  Turning over in bed (including adjusting bedclothes, sheets and blankets)?: 2  Sitting down on and standing up from a chair with arms (e.g., wheelchair, bedside commode, etc.): 2  Moving from lying on back to sitting on the side of the bed?: 2  Moving to and from a bed to a chair (including a wheelchair)?: 1  Need to walk in hospital room?: 1  Climbing 3-5 steps with a railing?: 1  Basic Mobility Total Score: 9       Therapeutic Activities and Exercises:   unable to work on LE exercises secondary to lightheadedness following UE exercises with OT.      Patient Education:    Patient educated on Fall risk, home safety and Home exercise program by explanation.  Patient was receptive to education and needs reinforcement.     Patient left supine with all lines intact and call button in reach.    GOALS:   Multidisciplinary Problems     Physical Therapy Goals        Problem: Physical Therapy Goal    Goal Priority Disciplines Outcome Goal Variances Interventions   Physical Therapy Goal     PT, PT/OT Ongoing, Progressing     Description:  Goals to be met by: 1/17/20    Patient will increase functional independence with mobility  by performin. Supine to sit with Stand-by Assistance  2. Sit to stand transfer with Minimal Assistance  3. Bed to chair transfer with Minimal Assistance using LRAD  4. Gait x10 feet with Minimal Assistance using LRAD  5. Lower extremity exercise program x10 reps per handout, with assistance as needed                      Time Tracking:     PT Received On: 20  PT Start Time: 1515     PT Stop Time: 1538  PT Total Time (min): 23 min     Billable Minutes: Therapeutic Activity 23 min    Treatment Type: Treatment  PT/PTA: PT     PTA Visit Number: 0     Osmar Mccrary, PT  2020

## 2020-01-07 NOTE — SUBJECTIVE & OBJECTIVE
"Interval History:  Patient with some confusion overnight, pulled off sling. Seen this morning and discussed at length with her family at bedside. Not totally herself yet, has been confused but then perked up well after a few minutes and discussed her history and working as an  and having to "lie" for her bosses back in the day and even her  gave her the okay to lie if it was important. Had very interesting stories and her family was reassured that she was starting to be feisty and more like herself. Per nursing she was more tired later in day again, they feel it may be the 4x day robaxin, will switch to BID now and see how that goes also. Wbc still up, does have UTi and urine was darker on exam today, appetite has been spotty, family will encourage to eat, boost ordered by nursing and she has been encouraging patient to drink it today with some success with strawberry flavor. Some light IVF in the interim to hydrate her today which I discussed with family as well. Urine Cx pending still, has no hx of UTi per family in the past ever before (and none on file) so would be odd to have resitant UTi. Low threshold to change antibiotics if WBC still up tomorrow or has a fever to zosyn for broader coverage.  Plan to watch today for improvement in labs and mental status to ensure staying on right course due to unexpected changes and famliy also concerned given that and has SNF waiting for her. Id expect  These to be stabilized for snf tomorrow    Review of Systems   Constitutional: Negative for chills and fever.   HENT: Negative.    Respiratory: Negative for cough and shortness of breath.    Cardiovascular: Negative for chest pain and palpitations.   Gastrointestinal: Negative for abdominal pain, diarrhea, nausea and vomiting.   Genitourinary: Negative for difficulty urinating, frequency and hematuria.   Neurological: Negative for light-headedness and headaches.     Objective:     Vital Signs (Most " "Recent):  Temp: 96.2 °F (35.7 °C) (01/07/20 1515)  Pulse: 76 (01/07/20 1620)  Resp: 18 (01/07/20 1515)  BP: (!) 102/58 (01/07/20 1515)  SpO2: 98 % (01/07/20 1515) Vital Signs (24h Range):  Temp:  [96.2 °F (35.7 °C)-97.8 °F (36.6 °C)] 96.2 °F (35.7 °C)  Pulse:  [] 76  Resp:  [16-18] 18  SpO2:  [92 %-99 %] 98 %  BP: (102-135)/(58-89) 102/58     Weight: 54.4 kg (120 lb)  Body mass index is 20.6 kg/m².    Intake/Output Summary (Last 24 hours) at 1/7/2020 1621  Last data filed at 1/7/2020 0534  Gross per 24 hour   Intake --   Output 400 ml   Net -400 ml      Physical Exam   Constitutional:   Thin elderly female lying comfortably. Intermittent confusion but easily redirectable and she self admits "im gonna forget you, im confused today"   HENT:   Head: Normocephalic and atraumatic.   Cardiovascular: Normal rate and regular rhythm.   Pulmonary/Chest: Effort normal. No respiratory distress.   Abdominal: Soft. Bowel sounds are normal. She exhibits no distension.   Musculoskeletal: She exhibits edema (dressing and mild edema of LLE).   LUE in sling. Bandages to LLE in place, clean.   Neurological: She is alert.   Oriented to person, family at bedside. interimttent confusion but can tell extensive stories at times about history. Redirects easily   Vitals reviewed.      Significant Labs: All pertinent labs within the past 24 hours have been reviewed.    Significant Imaging: I have reviewed all pertinent imaging results/findings within the past 24 hours.  "

## 2020-01-07 NOTE — PROGRESS NOTES
[facility] Hospital Medicine  Progress Note    Patient Name: Franny Arnold  MRN: [id number]  Patient Class: IP- Inpatient   Admission Date: 1/1/2020  Length of Stay: 5 days  Attending Physician: Kamini Collado MD  Primary Care Provider: Dinesh Wylie MD    [facility] Medicine Team: [facility] HOSP MED  Kamini Collado MD    Subjective:     Principal Problem:Closed displaced intertrochanteric fracture of left femur        HPI:  No notes on file    Overview/Hospital Course:  No notes on file    Interval History:  Intermittent confusion persists. Leukocytosis noted today. Pain controlled.    Review of Systems   Constitutional: Negative for chills and fever.   HENT: Negative.    Respiratory: Negative for cough and shortness of breath.    Cardiovascular: Negative for chest pain and palpitations.   Gastrointestinal: Negative for abdominal pain, diarrhea, nausea and vomiting.   Genitourinary: Negative for difficulty urinating, frequency and hematuria.   Neurological: Negative for light-headedness and headaches.     Objective:     Vital Signs (Most Recent):  Temp: 97.8 °F (36.6 °C) (01/06/20 2021)  Pulse: 84 (01/06/20 2031)  Resp: 18 (01/06/20 2021)  BP: 118/72 (01/06/20 2021)  SpO2: (!) 92 % (01/06/20 2031) Vital Signs (24h Range):  Temp:  [96.2 °F (35.7 °C)-98.7 °F (37.1 °C)] 97.8 °F (36.6 °C)  Pulse:  [79-97] 84  Resp:  [18] 18  SpO2:  [92 %-96 %] 92 %  BP: (114-150)/(70-97) 118/72     Weight: 54.4 kg (120 lb)  Body mass index is 20.6 kg/m².    Intake/Output Summary (Last 24 hours) at 1/6/2020 2153  Last data filed at 1/6/2020 1830  Gross per 24 hour   Intake 480 ml   Output 100 ml   Net 380 ml      Physical Exam   Constitutional:   Thin elderly female lying comfortably   HENT:   Head: Normocephalic and atraumatic.   Cardiovascular: Normal rate and regular rhythm.   Pulmonary/Chest: Effort normal. No respiratory distress.   Abdominal: Soft. Bowel sounds are normal. She exhibits no distension.    Musculoskeletal: She exhibits edema (dressing and mild edema of LLE).   LUE in sling   Neurological: She is alert.   Oriented to person   calm   Vitals reviewed.      Significant Labs: All pertinent labs within the past 24 hours have been reviewed.    Significant Imaging: I have reviewed all pertinent imaging results/findings within the past 24 hours.      Assessment/Plan:      * Closed displaced intertrochanteric fracture of left femur  Sustained while trying to get out of bed  X-ray showed: Acute intertrochanteric fracture of the proximal left femur  S/P L femur CMN on 1/2/20  Pain management per anesthesia  WBAT to LLE  PTOT  DVT prophylaxis with lovenox   H&H stable    Accepted to SNF when medially cleared       Confusion  Intermittent confusion but stable  Niece noted to me that she does have some dementia / memory impairments   May have been worsened by new UTI   Will continue to monitor closely       Leukocytosis  Unclear etiology of initial leukocytosis which resolved  Initial UA and CXR negative  New leukocytosis noted today, 1/6  Repeat UA today, 1/6 suggestive of UTI  Follow UCX  Started ceftriaxone   Afebrile       Fracture of proximal end of left humerus  X-ray showed: Acute proximal left humeral head/neck fracture  Sustained after a fall   In sling and swath  NWB to LUE         VTE Risk Mitigation (From admission, onward)         Ordered     enoxaparin injection 40 mg  Daily      01/02/20 1259     IP VTE HIGH RISK PATIENT  Once      01/02/20 1253     Place sequential compression device  Until discontinued      01/02/20 0749     Place ADAIR hose  Until discontinued      01/02/20 0749     Place sequential compression device  Until discontinued      01/01/20 1815                      Kamini Collado MD  Department of Hospital Medicine   [facility]

## 2020-01-07 NOTE — NURSING
Pt removed lue brace x 2. Non compliant at this time. Spoke with dr. estes and notified pt continues to remove sling to lue. No new orders received. Will continue to encourage. Stable.

## 2020-01-07 NOTE — ASSESSMENT & PLAN NOTE
Intermittent confusion but stable  Niece noted to me that she does have some dementia / memory impairments   May have been worsened by new UTI   Will continue to monitor closely

## 2020-01-07 NOTE — NURSING
"Pt states "my chest was hurting about an hour ago, but it  is better now." pt denies any discomfort at this time. Elevated hob 40 30 degrees.. O2 SAT 90%RA, applied 2l per nc o2 sat now 95%. Paged md on call and notified md of findings, tele nsl at this time. awaiting orders for ekg. Call bell in hand. Color wnl, no s/s of distress noted. Will continue to monitor.  "

## 2020-01-07 NOTE — ASSESSMENT & PLAN NOTE
Intermittent confusion but stable easily redirectable. Patient even endorses it on exam that she will forget me.  Niece noted to me that she does have some dementia / memory impairments. I suspect some is chronic as she can tell me a lot of stories on exam today about her history but then also waxes/wanes quickly  -cont to treat UTI, deescalate robaxin to BID also as nursing thinks may be makming her more sleepy. Avoid excess pain meds unless signicant

## 2020-01-07 NOTE — PROGRESS NOTES
"[facility] Hospital Medicine  Progress Note    Patient Name: Franny Arnold  MRN: [id number]  Patient Class: IP- Inpatient   Admission Date: 1/1/2020  Length of Stay: 6 days  Attending Physician: Ann Arriaga MD  Primary Care Provider: Dinesh Wylie MD    [facility] Medicine Team: [facility] HOSP MED H Ann Arriaga MD    Subjective:     Principal Problem:Closed displaced intertrochanteric fracture of left femur        HPI:  No notes on file    Overview/Hospital Course:  No notes on file    Interval History:  Patient with some confusion overnight, pulled off sling. Seen this morning and discussed at length with her family at bedside. Not totally herself yet, has been confused but then perked up well after a few minutes and discussed her history and working as an  and having to "lie" for her bosses back in the day and even her  gave her the okay to lie if it was important. Had very interesting stories and her family was reassured that she was starting to be feisty and more like herself. Per nursing she was more tired later in day again, they feel it may be the 4x day robaxin, will switch to BID now and see how that goes also. Wbc still up, does have UTi and urine was darker on exam today, appetite has been spotty, family will encourage to eat, boost ordered by nursing and she has been encouraging patient to drink it today with some success with strawberry flavor. Some light IVF in the interim to hydrate her today which I discussed with family as well. Urine Cx pending still, has no hx of UTi per family in the past ever before (and none on file) so would be odd to have resitant UTi. Low threshold to change antibiotics if WBC still up tomorrow or has a fever to zosyn for broader coverage.  Plan to watch today for improvement in labs and mental status to ensure staying on right course due to unexpected changes and famliy also concerned given that and has SNF waiting for her. " "Id expect  These to be stabilized for snf tomorrow    Review of Systems   Constitutional: Negative for chills and fever.   HENT: Negative.    Respiratory: Negative for cough and shortness of breath.    Cardiovascular: Negative for chest pain and palpitations.   Gastrointestinal: Negative for abdominal pain, diarrhea, nausea and vomiting.   Genitourinary: Negative for difficulty urinating, frequency and hematuria.   Neurological: Negative for light-headedness and headaches.     Objective:     Vital Signs (Most Recent):  Temp: 96.2 °F (35.7 °C) (01/07/20 1515)  Pulse: 76 (01/07/20 1620)  Resp: 18 (01/07/20 1515)  BP: (!) 102/58 (01/07/20 1515)  SpO2: 98 % (01/07/20 1515) Vital Signs (24h Range):  Temp:  [96.2 °F (35.7 °C)-97.8 °F (36.6 °C)] 96.2 °F (35.7 °C)  Pulse:  [] 76  Resp:  [16-18] 18  SpO2:  [92 %-99 %] 98 %  BP: (102-135)/(58-89) 102/58     Weight: 54.4 kg (120 lb)  Body mass index is 20.6 kg/m².    Intake/Output Summary (Last 24 hours) at 1/7/2020 1621  Last data filed at 1/7/2020 0534  Gross per 24 hour   Intake --   Output 400 ml   Net -400 ml      Physical Exam   Constitutional:   Thin elderly female lying comfortably. Intermittent confusion but easily redirectable and she self admits "im gonna forget you, im confused today"   HENT:   Head: Normocephalic and atraumatic.   Cardiovascular: Normal rate and regular rhythm.   Pulmonary/Chest: Effort normal. No respiratory distress.   Abdominal: Soft. Bowel sounds are normal. She exhibits no distension.   Musculoskeletal: She exhibits edema (dressing and mild edema of LLE).   LUE in sling. Bandages to LLE in place, clean.   Neurological: She is alert.   Oriented to person, family at bedside. interimttent confusion but can tell extensive stories at times about history. Redirects easily   Vitals reviewed.      Significant Labs: All pertinent labs within the past 24 hours have been reviewed.    Significant Imaging: I have reviewed all pertinent imaging " results/findings within the past 24 hours.      Assessment/Plan:      * Closed displaced intertrochanteric fracture of left femur  Sustained while trying to get out of bed with acute intertrochanteric fx of Left femur.   -s/p CMN with ortho on 1/2. WBAT to LLE, lovenox for 28 days until January 31st post op for DVT ppx.  -pain controlled well post op, change robaxin to BID as possibly oversedating her. Avoid excessive meds unless having pain.  -SNF for restoration of ADLS. BM reported on 1/6.  -Hg stable post op at 10  -ortho f/u in 2 weeks for bandage removal      Acute cystitis  -see leukocytosis.       Confusion  Intermittent confusion but stable easily redirectable. Patient even endorses it on exam that she will forget me.  Niece noted to me that she does have some dementia / memory impairments. I suspect some is chronic as she can tell me a lot of stories on exam today about her history but then also waxes/wanes quickly  -cont to treat UTI, deescalate robaxin to BID also as nursing thinks may be makming her more sleepy. Avoid excess pain meds unless signicant        Leukocytosis  Unclear etiology of initial leukocytosis which resolved on admit, probably that was from stress response and rhabdo present on admit.  Initial UA and CXR negative on admit.  New leukocytosis noted 1/6 and UA consistent with UTI. Urine dark, cloudy in purewick. No hx of UTIs per family  -WBC 20 and some encephalopathy, continue to trend. On rocehpin now, plan to likely change to cipro on dc renal dose given age to complete 1 week therapy considering recent catheter use.  -CX pending  Afebrile, but given WBC still up, low threshold if fevers to broaden to zosyn. No reason to suspect resistance based on never having UTI in the past though       Fracture of proximal end of left humerus  X-ray showed: Acute proximal left humeral head/neck fracture  Sustained in fall on admit.  In sling and swath, keep in place until ortho f/u at all times.  dont let her pull on it, redirect.  NWB to LUE         VTE Risk Mitigation (From admission, onward)         Ordered     enoxaparin injection 40 mg  Daily      01/02/20 1259     IP VTE HIGH RISK PATIENT  Once      01/02/20 1253     Place sequential compression device  Until discontinued      01/02/20 0749     Place ADAIR hose  Until discontinued      01/02/20 0749     Place sequential compression device  Until discontinued      01/01/20 1815                Dispo- likely to sNF tomorrow as long as mental status and UTI are stable/improving.  Ortho f/u 2 weeks      Ann Arriaga MD  Department of Hospital Medicine   [facility]

## 2020-01-07 NOTE — ASSESSMENT & PLAN NOTE
Unclear etiology of initial leukocytosis which resolved  Initial UA and CXR negative  New leukocytosis noted today, 1/6  Repeat UA today, 1/6 suggestive of UTI  Follow UCX  Started ceftriaxone   Afebrile

## 2020-01-07 NOTE — ASSESSMENT & PLAN NOTE
Sustained while trying to get out of bed with acute intertrochanteric fx of Left femur.   -s/p CMN with ortho on 1/2. WBAT to LLE, lovenox for 28 days until January 31st post op for DVT ppx.  -pain controlled well post op, change robaxin to BID as possibly oversedating her. Avoid excessive meds unless having pain.  -SNF for restoration of ADLS. BM reported on 1/6.  -Hg stable post op at 10  -ortho f/u in 2 weeks for bandage removal

## 2020-01-08 VITALS
RESPIRATION RATE: 16 BRPM | HEART RATE: 82 BPM | HEIGHT: 64 IN | TEMPERATURE: 96 F | BODY MASS INDEX: 20.49 KG/M2 | WEIGHT: 120 LBS | OXYGEN SATURATION: 100 % | DIASTOLIC BLOOD PRESSURE: 74 MMHG | SYSTOLIC BLOOD PRESSURE: 117 MMHG

## 2020-01-08 LAB
ANION GAP SERPL CALC-SCNC: 9 MMOL/L (ref 8–16)
ANISOCYTOSIS BLD QL SMEAR: SLIGHT
BASOPHILS # BLD AUTO: 0.05 K/UL (ref 0–0.2)
BASOPHILS NFR BLD: 0.3 % (ref 0–1.9)
BUN SERPL-MCNC: 22 MG/DL (ref 10–30)
BURR CELLS BLD QL SMEAR: ABNORMAL
CALCIUM SERPL-MCNC: 9.1 MG/DL (ref 8.7–10.5)
CHLORIDE SERPL-SCNC: 112 MMOL/L (ref 95–110)
CO2 SERPL-SCNC: 19 MMOL/L (ref 23–29)
CREAT SERPL-MCNC: 0.8 MG/DL (ref 0.5–1.4)
DIFFERENTIAL METHOD: ABNORMAL
EOSINOPHIL # BLD AUTO: 0.2 K/UL (ref 0–0.5)
EOSINOPHIL NFR BLD: 1.1 % (ref 0–8)
ERYTHROCYTE [DISTWIDTH] IN BLOOD BY AUTOMATED COUNT: 16.3 % (ref 11.5–14.5)
EST. GFR  (AFRICAN AMERICAN): >60 ML/MIN/1.73 M^2
EST. GFR  (NON AFRICAN AMERICAN): >60 ML/MIN/1.73 M^2
GLUCOSE SERPL-MCNC: 93 MG/DL (ref 70–110)
HCT VFR BLD AUTO: 33.1 % (ref 37–48.5)
HGB BLD-MCNC: 9.9 G/DL (ref 12–16)
HYPOCHROMIA BLD QL SMEAR: ABNORMAL
IMM GRANULOCYTES # BLD AUTO: 0.32 K/UL (ref 0–0.04)
IMM GRANULOCYTES NFR BLD AUTO: 1.6 % (ref 0–0.5)
LYMPHOCYTES # BLD AUTO: 2 K/UL (ref 1–4.8)
LYMPHOCYTES NFR BLD: 10 % (ref 18–48)
MAGNESIUM SERPL-MCNC: 2 MG/DL (ref 1.6–2.6)
MCH RBC QN AUTO: 27.9 PG (ref 27–31)
MCHC RBC AUTO-ENTMCNC: 29.9 G/DL (ref 32–36)
MCV RBC AUTO: 93 FL (ref 82–98)
MONOCYTES # BLD AUTO: 1.3 K/UL (ref 0.3–1)
MONOCYTES NFR BLD: 6.8 % (ref 4–15)
NEUTROPHILS # BLD AUTO: 15.9 K/UL (ref 1.8–7.7)
NEUTROPHILS NFR BLD: 80.2 % (ref 38–73)
NRBC BLD-RTO: 0 /100 WBC
OVALOCYTES BLD QL SMEAR: ABNORMAL
PHOSPHATE SERPL-MCNC: 3 MG/DL (ref 2.7–4.5)
PLATELET # BLD AUTO: 339 K/UL (ref 150–350)
PMV BLD AUTO: ABNORMAL FL (ref 9.2–12.9)
POIKILOCYTOSIS BLD QL SMEAR: SLIGHT
POLYCHROMASIA BLD QL SMEAR: ABNORMAL
POTASSIUM SERPL-SCNC: 4.2 MMOL/L (ref 3.5–5.1)
RBC # BLD AUTO: 3.55 M/UL (ref 4–5.4)
SODIUM SERPL-SCNC: 140 MMOL/L (ref 136–145)
WBC # BLD AUTO: 19.77 K/UL (ref 3.9–12.7)

## 2020-01-08 PROCEDURE — 85025 COMPLETE CBC W/AUTO DIFF WBC: CPT

## 2020-01-08 PROCEDURE — 25000003 PHARM REV CODE 250: Performed by: HOSPITALIST

## 2020-01-08 PROCEDURE — 25000003 PHARM REV CODE 250: Performed by: STUDENT IN AN ORGANIZED HEALTH CARE EDUCATION/TRAINING PROGRAM

## 2020-01-08 PROCEDURE — 83735 ASSAY OF MAGNESIUM: CPT

## 2020-01-08 PROCEDURE — 99239 PR HOSPITAL DISCHARGE DAY,>30 MIN: ICD-10-PCS | Mod: ,,, | Performed by: HOSPITALIST

## 2020-01-08 PROCEDURE — 99239 HOSP IP/OBS DSCHRG MGMT >30: CPT | Mod: ,,, | Performed by: HOSPITALIST

## 2020-01-08 PROCEDURE — 63600175 PHARM REV CODE 636 W HCPCS: Performed by: PHYSICIAN ASSISTANT

## 2020-01-08 PROCEDURE — 97110 THERAPEUTIC EXERCISES: CPT

## 2020-01-08 PROCEDURE — 80048 BASIC METABOLIC PNL TOTAL CA: CPT

## 2020-01-08 PROCEDURE — 84100 ASSAY OF PHOSPHORUS: CPT

## 2020-01-08 PROCEDURE — 97535 SELF CARE MNGMENT TRAINING: CPT

## 2020-01-08 PROCEDURE — 36415 COLL VENOUS BLD VENIPUNCTURE: CPT

## 2020-01-08 RX ORDER — ACETAMINOPHEN 325 MG/1
650 TABLET ORAL EVERY 4 HOURS PRN
Status: DISCONTINUED | OUTPATIENT
Start: 2020-01-08 | End: 2020-01-08 | Stop reason: HOSPADM

## 2020-01-08 RX ORDER — KETOROLAC TROMETHAMINE 15 MG/ML
15 INJECTION, SOLUTION INTRAMUSCULAR; INTRAVENOUS ONCE
Status: COMPLETED | OUTPATIENT
Start: 2020-01-08 | End: 2020-01-08

## 2020-01-08 RX ORDER — IBUPROFEN 200 MG
24 TABLET ORAL
Status: DISCONTINUED | OUTPATIENT
Start: 2020-01-08 | End: 2020-01-08 | Stop reason: HOSPADM

## 2020-01-08 RX ORDER — GLUCAGON 1 MG
1 KIT INJECTION
Status: DISCONTINUED | OUTPATIENT
Start: 2020-01-08 | End: 2020-01-08 | Stop reason: HOSPADM

## 2020-01-08 RX ORDER — DEXTROSE MONOHYDRATE 100 MG/ML
12.5 INJECTION, SOLUTION INTRAVENOUS
Status: DISCONTINUED | OUTPATIENT
Start: 2020-01-08 | End: 2020-01-08 | Stop reason: HOSPADM

## 2020-01-08 RX ORDER — IPRATROPIUM BROMIDE AND ALBUTEROL SULFATE 2.5; .5 MG/3ML; MG/3ML
3 SOLUTION RESPIRATORY (INHALATION) EVERY 4 HOURS PRN
Status: DISCONTINUED | OUTPATIENT
Start: 2020-01-08 | End: 2020-01-08 | Stop reason: HOSPADM

## 2020-01-08 RX ORDER — DEXTROSE MONOHYDRATE 100 MG/ML
25 INJECTION, SOLUTION INTRAVENOUS
Status: DISCONTINUED | OUTPATIENT
Start: 2020-01-08 | End: 2020-01-08 | Stop reason: HOSPADM

## 2020-01-08 RX ORDER — IBUPROFEN 200 MG
16 TABLET ORAL
Status: DISCONTINUED | OUTPATIENT
Start: 2020-01-08 | End: 2020-01-08 | Stop reason: HOSPADM

## 2020-01-08 RX ADMIN — ACETAMINOPHEN 650 MG: 325 TABLET ORAL at 05:01

## 2020-01-08 RX ADMIN — MELATONIN 2000 UNITS: at 09:01

## 2020-01-08 RX ADMIN — POLYETHYLENE GLYCOL 3350 17 G: 17 POWDER, FOR SOLUTION ORAL at 09:01

## 2020-01-08 RX ADMIN — KETOROLAC TROMETHAMINE 15 MG: 15 INJECTION, SOLUTION INTRAMUSCULAR; INTRAVENOUS at 04:01

## 2020-01-08 NOTE — PLAN OF CARE
01/08/20 1120   Post-Acute Status   Post-Acute Authorization Placement;Other   Other Status See Comments     Patient will possibly discharge to [facility] today, ANNA will continue to follow up with Dionna in admissions.    Elham Merino LMSW  [facility]   [id number]

## 2020-01-08 NOTE — PLAN OF CARE
Pt pleasantly confused and agreeable to therapy. Pt requires Max A x1 in bed mobility due to weakness and pain. Pt requires Total A to apolonia/doff sling and swathe. Pt sat EOB ~20 mins Mod-SBA for seated balance. Pt requires Max A for UB dressing when seated EOB. Pt performed hand, wrist, and elbow ROM exercises 3 x 10 reps with rest breaks between sets. Continue OT POC.      Problem: Occupational Therapy Goal  Goal: Occupational Therapy Goal  Description  Goals to be met by: 2/2/2020     Patient will increase functional independence with ADLs by performing:    UE Dressing with Moderate Assistance.  LE Dressing with Maximum Assistance and Assistive Devices as needed.  Grooming while EOB with Minimal Assistance.  Toileting from bedside commode with Moderate Assistance for hygiene and clothing management.   Sitting at edge of bed x10 minutes with Contact Guard Assistance.  Supine to sit with Moderate Assistance.  Toilet transfer to bedside commode with Maximum Assistance.     Outcome: Ongoing, Progressing

## 2020-01-08 NOTE — PROGRESS NOTES
[facility]  Orthopedics  Progress Note    Patient Name: Franny Arnold  MRN: [id number]  Admission Date: 1/1/2020  Hospital Length of Stay: 7 days  Attending Provider: Ann Arriaga MD  Primary Care Provider: Dinesh Wylie MD  Follow-up For: Procedure(s) (LRB):  INSERTION, INTRAMEDULLARY ZAYNAB (Left)    Post-Operative Day: 6 Days Post-Op  Subjective:     Principal Problem:Closed displaced intertrochanteric fracture of left femur    Principal Orthopedic Problem: Same    Interval History: Patient seen and examined at bedside.  Still with some confusion.  Hgb stable.  WBC at 19.  Unable to stand for transfers due to lightheadedness    Review of patient's allergies indicates:  No Known Allergies    Current Facility-Administered Medications   Medication    0.9%  NaCl infusion    acetaminophen tablet 650 mg    acetaminophen tablet 650 mg    albuterol-ipratropium 2.5 mg-0.5 mg/3 mL nebulizer solution 3 mL    bisacodyl suppository 10 mg    cefTRIAXone injection 1 g    dextrose 10 % infusion    dextrose 10 % infusion    enoxaparin injection 40 mg    glucagon (human recombinant) injection 1 mg    glucose chewable tablet 16 g    glucose chewable tablet 24 g    melatonin tablet 6 mg    methocarbamol tablet 500 mg    ondansetron injection 4 mg    polyethylene glycol packet 17 g    promethazine (PHENERGAN) 6.25 mg in dextrose 5 % 50 mL IVPB    sodium chloride 0.9% flush 10 mL    sodium chloride 0.9% flush 10 mL    sodium chloride 0.9% flush 10 mL    vitamin D 1000 units tablet 2,000 Units     Objective:     Vital Signs (Most Recent):  Temp: 96 °F (35.6 °C) (01/07/20 1950)  Pulse: 72 (01/08/20 0700)  Resp: 20 (01/07/20 1950)  BP: 118/63 (01/07/20 1950)  SpO2: 98 % (01/07/20 1950) Vital Signs (24h Range):  Temp:  [96 °F (35.6 °C)-96.5 °F (35.8 °C)] 96 °F (35.6 °C)  Pulse:  [72-96] 72  Resp:  [16-20] 20  SpO2:  [98 %-99 %] 98 %  BP: (102-130)/(58-68) 118/63     Weight: 54.4 kg (120  "lb)  Height: 5' 4" (162.6 cm)  Body mass index is 20.6 kg/m².      Intake/Output Summary (Last 24 hours) at 1/8/2020 0841  Last data filed at 1/8/2020 0500  Gross per 24 hour   Intake 600 ml   Output 1050 ml   Net -450 ml       Ortho/SPM Exam      Physical Exam:  NAD, A/O x 3.  Wound c/d/i with clean dressing.  No focal motor or sensory deficits noted.  LUE:   In sling and swath with ecchymosis  NVI      Significant Labs:   CBC:   Recent Labs   Lab 01/06/20  1025 01/07/20  0325 01/08/20  0342   WBC 16.98* 20.76* 19.77*   HGB 10.6* 10.6* 9.9*   HCT 34.7* 33.1* 33.1*    339 339     All pertinent labs within the past 24 hours have been reviewed.    Significant Imaging: I have reviewed and interpreted all pertinent imaging results/findings.    Assessment/Plan:     * Closed displaced intertrochanteric fracture of left femur  Franny Arnold is a 89 y.o. female s/p L femur CMN on 1/2/20 with L prox humerus fx.      - Weight bearing status: WBAT LLE and NWB LUE  - Pain control: multimodal  - Antibiotics: ceftriaxone for UTI   - Hgb stable   - DVT Prophylaxis: lovenox , SCD's at all times when not ambulating.  - PT/OT  - Dispo: continue medical care with early mobilization  Please keep sling and swathe in place           Fracture of proximal end of left humerus  Sling and swath, NWB KATELYN Varghese MD  Orthopedics  [facility]  "

## 2020-01-08 NOTE — PT/OT/SLP PROGRESS
Occupational Therapy   Treatment    Name: Franny Arnold  MRN: [id number]  Admitting Diagnosis:  Closed displaced intertrochanteric fracture of left femur  6 Days Post-Op    Recommendations:     Discharge Recommendations: nursing facility, skilled  Discharge Equipment Recommendations:  bedside commode, wheelchair  Barriers to discharge:  Decreased caregiver support    Assessment:     Franny Arnold is a 89 y.o. female with a medical diagnosis of Closed displaced intertrochanteric fracture of left femur.  She presents with the following Performance deficits affecting function are weakness, impaired endurance, impaired self care skills, gait instability, impaired functional mobilty, impaired cognition, impaired balance, decreased lower extremity function, pain, decreased safety awareness.     Pt pleasantly confused and agreeable to therapy. Pt requires Max A x1 in bed mobility due to weakness and pain. Pt requires Total A to apolonia/doff sling and swathe. Pt sat EOB ~20 mins Mod-SBA for seated balance. Pt requires Max A for UB dressing when seated EOB. Pt performed hand, wrist, and elbow ROM exercises 3 x 10 reps with rest breaks between sets. Continue OT POC.    Rehab Prognosis:  Fair; patient would benefit from acute skilled OT services to address these deficits and reach maximum level of function.       Plan:     Patient to be seen 5 x/week to address the above listed problems via self-care/home management, therapeutic activities, therapeutic exercises  · Plan of Care Expires: 02/02/20  · Plan of Care Reviewed with: patient    Subjective     Pain/Comfort:  · Pain Rating 1: 0/10  · Location - Side 1: Left  · Location 1: hip  · Pain Addressed 1: Reposition, Cessation of Activity, Distraction    Objective:     Communicated with: RN prior to session.  Patient found supine with PureWick, telemetry, FCD upon OT entry to room.    General Precautions: Standard, fall   Orthopedic Precautions:LUE non weight bearing, LLE weight  bearing as tolerated   Braces:       Occupational Performance:     Bed Mobility:    · Patient completed Rolling/Turning to Left with  maximal assistance and 1 persons  · Patient completed Rolling/Turning to Right with maximal assistance and 1 persons  · Patient completed Supine to Sit with maximal assistance and 1 persons  · Patient completed Sit to Supine with maximal assistance and 1 persons     Functional Mobility/Transfers:  · Not attempted  · Functional Mobility: Pt pleasantly confused and agreeable to therapy. Pt requires Max A x1 in bed mobility due to weakness and pain. Pt requires Total A to apolonia/doff sling and swathe. Pt sat EOB ~20 mins Mod-SBA for seated balance. Pt requires Max A for UB dressing when seated EOB. Pt performed hand, wrist, and elbow ROM exercises 3 x 10 reps with rest breaks between sets. Continue OT POC.    Activities of Daily Living:  · Upper Body Dressing: maximal assistance apolonia gown  · Toileting: maximal assistance Rolling R<>L for perineal hygiene      [facility] 6 Click ADL: 8    Treatment & Education:  - OT/POC-  - Importance of mobility to maximize recovery.  - safety w/ functional mobility; hand placement to ensure safe transfers to various surfaces in prep for ADLs  - Educated family on SNF        Patient left supine with all lines intact, call button in reach, RN notified and family presentEducation:      GOALS:   Multidisciplinary Problems     Occupational Therapy Goals        Problem: Occupational Therapy Goal    Goal Priority Disciplines Outcome Interventions   Occupational Therapy Goal     OT, PT/OT Ongoing, Progressing    Description:  Goals to be met by: 2/2/2020     Patient will increase functional independence with ADLs by performing:    UE Dressing with Moderate Assistance.  LE Dressing with Maximum Assistance and Assistive Devices as needed.  Grooming while EOB with Minimal Assistance.  Toileting from bedside commode with Moderate Assistance for hygiene and clothing  management.   Sitting at edge of bed x10 minutes with Contact Guard Assistance.  Supine to sit with Moderate Assistance.  Toilet transfer to bedside commode with Maximum Assistance.                      Time Tracking:     OT Date of Treatment: 01/08/20  OT Start Time: 1447  OT Stop Time: 1545  OT Total Time (min): 58 min    Billable Minutes:Self Care/Home Management 48  Therapeutic Exercise 10    Nimco Dominique OT  1/8/2020

## 2020-01-08 NOTE — NURSING
"Called into room per pt family. Pt states "i'm miserable, i'm not hurting, I just wanna die." Family at bedside. Turned and repostioned. No s/s of distress noted. MD on call paged to give update. No s/s of distress noted. Will continue to monitor.  "

## 2020-01-08 NOTE — SUBJECTIVE & OBJECTIVE
"Principal Problem:Closed displaced intertrochanteric fracture of left femur    Principal Orthopedic Problem: Same    Interval History: Patient seen and examined at bedside.  Still with some confusion.  Hgb stable.  WBC at 19.  Unable to stand for transfers due to lightheadedness    Review of patient's allergies indicates:  No Known Allergies    Current Facility-Administered Medications   Medication    0.9%  NaCl infusion    acetaminophen tablet 650 mg    acetaminophen tablet 650 mg    albuterol-ipratropium 2.5 mg-0.5 mg/3 mL nebulizer solution 3 mL    bisacodyl suppository 10 mg    cefTRIAXone injection 1 g    dextrose 10 % infusion    dextrose 10 % infusion    enoxaparin injection 40 mg    glucagon (human recombinant) injection 1 mg    glucose chewable tablet 16 g    glucose chewable tablet 24 g    melatonin tablet 6 mg    methocarbamol tablet 500 mg    ondansetron injection 4 mg    polyethylene glycol packet 17 g    promethazine (PHENERGAN) 6.25 mg in dextrose 5 % 50 mL IVPB    sodium chloride 0.9% flush 10 mL    sodium chloride 0.9% flush 10 mL    sodium chloride 0.9% flush 10 mL    vitamin D 1000 units tablet 2,000 Units     Objective:     Vital Signs (Most Recent):  Temp: 96 °F (35.6 °C) (01/07/20 1950)  Pulse: 72 (01/08/20 0700)  Resp: 20 (01/07/20 1950)  BP: 118/63 (01/07/20 1950)  SpO2: 98 % (01/07/20 1950) Vital Signs (24h Range):  Temp:  [96 °F (35.6 °C)-96.5 °F (35.8 °C)] 96 °F (35.6 °C)  Pulse:  [72-96] 72  Resp:  [16-20] 20  SpO2:  [98 %-99 %] 98 %  BP: (102-130)/(58-68) 118/63     Weight: 54.4 kg (120 lb)  Height: 5' 4" (162.6 cm)  Body mass index is 20.6 kg/m².      Intake/Output Summary (Last 24 hours) at 1/8/2020 0841  Last data filed at 1/8/2020 0500  Gross per 24 hour   Intake 600 ml   Output 1050 ml   Net -450 ml       Ortho/SPM Exam      Physical Exam:  NAD, A/O x 3.  Wound c/d/i with clean dressing.  No focal motor or sensory deficits noted.  LUE:   In sling and swath with " ecchymosis  NVI      Significant Labs:   CBC:   Recent Labs   Lab 01/06/20  1025 01/07/20  0325 01/08/20  0342   WBC 16.98* 20.76* 19.77*   HGB 10.6* 10.6* 9.9*   HCT 34.7* 33.1* 33.1*    339 339     All pertinent labs within the past 24 hours have been reviewed.    Significant Imaging: I have reviewed and interpreted all pertinent imaging results/findings.

## 2020-01-08 NOTE — ASSESSMENT & PLAN NOTE
Franny Arnold is a 89 y.o. female s/p L femur CMN on 1/2/20 with L prox humerus fx.      - Weight bearing status: WBAT LLE and NWB LUE  - Pain control: multimodal  - Antibiotics: ceftriaxone for UTI   - Hgb stable   - DVT Prophylaxis: lovenox , SCD's at all times when not ambulating.  - PT/OT  - Dispo: continue medical care with early mobilization  Please keep sling and swathe in place

## 2020-01-08 NOTE — PLAN OF CARE
01/08/20 1538   Post-Acute Status   Post-Acute Authorization Placement;Other   Post-Acute Placement Status Set-up Complete     Patient is discharging to [facility] today.  set up transportation via [facility] scheduled for 4:30 patient will be transported via stretcher.  Nurse call report to [phone]. Patient will be admitted to Room P104.    Elham Merino LMSW  [facility]   [id number]

## 2020-01-09 NOTE — DISCHARGE SUMMARY
See progress note from 1/8 which is erroneously labeled discharge summary in tab but is a discharge summary in the note

## 2020-01-09 NOTE — PLAN OF CARE
Patient discharged to [facility].     01/09/20 1333   Final Note   Assessment Type Final Discharge Note   Anticipated Discharge Disposition SNF   What phone number can be called within the next 1-3 days to see how you are doing after discharge?   ([phone])   Hospital Follow Up  Appt(s) scheduled? Yes   Discharge plans and expectations educations in teach back method with documentation complete? Yes   Right Care Referral Info   Post Acute Recommendation SNF / Sub-Acute Rehab   Referral Type Skilled Nursing Facility   Facility Name [facility]

## 2020-01-11 LAB
BACTERIA UR CULT: ABNORMAL
BACTERIA UR CULT: ABNORMAL

## 2020-01-12 NOTE — PROGRESS NOTES
[facility]                                 Skilled Nursing Facility                   Progress Note     Admit Date: 1/8/20  RUTH   Principal Problem:  Debility r/t recent L trochanter and L humerus fx 2/2 fall  HPI obtained from patient interview and chart review     Visit Date: 1/13/2020    Chief Complaint: Establish Care/ Initial Visit s/p L CMN after L trochanter, L humerus fx    HPI:   Ms. Arnold is a 89 yo female with no significant PMHx. She had a fall at home, resulted in a L trochanter, L humerus fracture. She is s/p CMN on 1/2 for L trochanter fracture. Ortho recommended for patient to wear L arm sling for humerus fracture. She also was diagnosed with a Pseudomonas UTI with IV Rocephin, then transitioned to PO Cipro. Patient experienced delirium during admit, she has a reported Hx of dementia, and was thought to be exacerbated by UTI and sundowning. Therapy recommended to send patient to skilled facility for rehab.     Patient will be treated at [facility] SNF with PT and OT to improve functional status and ability to perform ADLs.     Past Medical History: Patient has a past medical history of Dementia.    Past Surgical History: Patient has a past surgical history that includes Appendectomy; Tonsillectomy; and Insertion of intramedullary cesar (Left, 1/2/2020).    Social History: Patient reports that she has never smoked. She has never used smokeless tobacco. She reports that she drank alcohol. She reports that she does not use drugs.    Family History: family history is not on file.    Allergies: Patient has No Known Allergies.    ROS  Constitutional: Negative for fever or fatigue.   Eyes: Negative for blurred vision, double vision and discharge.   Respiratory: Negative for cough, shortness of breath and wheezing.    Cardiovascular: Negative for chest pain, palpitations, and leg swelling.   Gastrointestinal: Negative for  abdominal pain, constipation, diarrhea, nausea and vomiting.   Genitourinary: Negative for dysuria, frequency and urgency.   Musculoskeletal:  + generalized weakness. Negative for back pain and myalgias.   Skin: Negative for itching and rash.   Neurological: Negative for dizziness, speech change, and headaches.   Psychiatric/Behavioral: Negative for depression. The patient is not nervous/anxious.      PEx  BP:  129/57, HR      Constitutional: Patient appears well-developed and in no distress   Head: Normocephalic and atraumatic.   Eyes: Pupils are equal, round, and reactive to light.   Neck: Normal range of motion. Neck supple.   Cardiovascular: Normal rate, regular rhythm and normal heart sounds.    Pulmonary/Chest: Effort normal and breath sounds are clear  Abdominal: Soft. Bowel sounds are normal.   Musculoskeletal: Normal range of motion.   Neurological: Alert and oriented to person, place, and time.   Psychiatric: Normal mood and affect. Behavior is normal.   Skin: Skin is warm and dry. Full skin assessment + L trochanter and L thigh surgical incisions    Wound evaluation completed on 1/13/20:    Wound location: L trochanter surgical incision  Present on admit: yes  Appearance of wound:  100% epithelial  Drainage characteristic: None  Wound length:9.18 cm   Wound width: 2.23 cm  Wound depth: 0 cm   Olena wound area: normal skin tone   Following: SANDIE NP- weekly. This NP monthly- last observed on (1/13/20), wound care RN weekly- last observed on (1/13/20)    Wound location: L thigh surgical incision  Present on admit: yes  Appearance of wound: 100 % epithelial   Drainage characteristic: None  Wound length:7.06 cm   Wound width: 2.27 cm  Wound depth: 0 cm   Olena wound area: normal skin tone   Following: WC NP- weekly. This NP monthly- last observed on (1/13/20), wound care RN weekly- last observed on (1/13/20)      Assessment and Plan:    Aftercare of recent Closed displaced intertrochanteric fracture of left  femur   S/p CMN on 1/2  Debility r/t recent L femur fx, ongoing  -s/p CMN with ortho on 1/2. WBAT to LLE, lovenox for 28 days until January 31st post op for DVT ppx.  - Continue with PT/OT for gait training and strengthening and restoration of ADL's   - Encourage mobility, OOB in chair, and early ambulation as appropriate  - Fall precautions   - Monitor for bowel and bladder dysfunction  - Monitor for and prevent skin breakdown and pressure ulcers  - Continue DVT prophylaxis with Lovenox-end date 1/31  - WBAT to LLE  - Pain control: Tylenol PRN, Robaxin QID x 14 days  - BM regimen: Miralax PRN  - ortho f/u in 2 weeks for bandage removal on 1/21/20    Lower extremity edema, ongoing  PAD, chronic  -1/13  bilateral LE on 1/2 showed mild to moderate peripheral artery disease    Recent Hx of Confusion, ongoing  Dementia without behavioral disturbance, ongoing  -Intermittent confusion hx of dementia / memory impairments>UTI, sundowning> deescalated robaxin to BID     Pseudomonas UTI, ongoing   Recent Hx of Leukocytosis, ongoing  -Unclear etiology of initial leukocytosis which resolved on admit, probably that was from stress response and rhabdo>Initial UA and CXR negative on admit.  -Confusion> UA +> UC + for pseudomonas>Tx with Rocephin, transitioned to Cipro  -Continue Cipro-end date 1/14        Aftercare of recent fracture of proximal end of left humerus, ongoing  Aftercare of recent fall, ongoing  -X-ray showed: Acute proximal left humeral head/neck fracture  -Continue sling and swath, keep in place until ortho f/u at all times.   -Continue NWB to LUE        Future Appointments   Date Time Provider Department Center   1/21/2020  1:30 PM [facility] XRORTHO1 485 LB LIMIT [facility] XRAYORT Amol crow Ort   1/21/2020  1:45 PM [facility] XRORTHO1 485 LB LIMIT [facility] XRAYORT Amol crow Ort   1/21/2020  2:00 PM Adithya Addison MD [facility] ORTHO Amol Colon         I certify that SNF services are required to be given on an inpatient basis  because Franny Arnold needs for skilled nursing care and/or skilled rehabilitation are required on a daily basis and such services can only practically be provided in a skilled nursing facility setting and are for an ongoing condition for which she received inpatient care in the hospital.     Total time of the visit 111 minutes  Non physical exam/ non charting time: 81 minutes   Start Time:1400  Stop Time:1551  Description of non physical exam/non charting time: counseling patient on clinical conditions and therapies provided regarding pain control, therapy plan of care, treatment of UTI.  Extensive chart review completed including all consultation notes.  All pertinent laboratory and radiographical images reviewed.        Lisette Tesfaye NP          Patient note was created using MModal Dictation.  Any errors in syntax or even information may not have been identified and edited on initial review prior to signing this note.

## 2020-01-13 ENCOUNTER — DOCUMENTATION ONLY (OUTPATIENT)
Dept: HEPATOLOGY | Facility: HOSPITAL | Age: 85
End: 2020-01-13

## 2020-01-17 NOTE — PROGRESS NOTES
[facility]                                 Skilled Nursing Facility                   Progress Note     Admit Date: 1/8/20  RUTH   Principal Problem:  Debility r/t recent L trochanter and L humerus fx 2/2 fall  HPI obtained from patient interview and chart review     Visit Date: 1/20/2020    Chief Complaint:  Lower extremity edema, lab review, BP/HR monitoring, weight monitoring, re-evaluation of UTI antibiotic    HPI:   Ms. Arnold is a 89 yo female with no significant PMHx. She had a fall at home, resulted in a L trochanter, L humerus fracture. She is s/p CMN on 1/2 for L trochanter fracture. Ortho recommended for patient to wear L arm sling for humerus fracture. She also was diagnosed with a Pseudomonas UTI with IV Rocephin, then transitioned to PO Cipro. Patient experienced delirium during admit, she has a reported Hx of dementia, and was thought to be exacerbated by UTI and sundowning. Therapy recommended to send patient to skilled facility for rehab.     Patient will be treated at [facility] with PT and OT to improve functional status and ability to perform ADLs.     Interval history:  BP 97/59, HR 81, stable on current regimen.  Patient has +2 bilateral lower extremity edema, blisters noted to left ankle area, discussed with WC RN at facility, will consult WC NP to follow closely.  Patient likely needs diuresis to improve lower extremity edema. Lab work from 1/13 reviewed today, BUN/CR 41/1.06, albumin 3, pre-albumin 14-continuing on Promod and Ensure t.i.d. with meals, vitamin-D level 25.6-vit d recently increased per MD, H&H 9/28, WBC count 15-elevated, will trend, TSH 2.59. Most recent weight 123.5 lb, will trend.  Patient has completed antibiotic treatment of UTI, no further treatment indicated.  She continues to wear brace, pain controlled on current pain regimen.  Appointment with Orthopedics scheduled for tomorrow,  will await recommendations.  Patient is medically stable.  Will continue to monitor and treat of chronic conditions.    Past Medical History: Patient has a past medical history of Dementia.    Past Surgical History: Patient has a past surgical history that includes Appendectomy; Tonsillectomy; and Insertion of intramedullary cesar (Left, 1/2/2020).    Social History: Patient reports that she has never smoked. She has never used smokeless tobacco. She reports that she drank alcohol. She reports that she does not use drugs.    Family History: family history is not on file.    Allergies: Patient has No Known Allergies.    ROS  Constitutional: Negative for fever or fatigue.   Eyes: Negative for blurred vision, double vision and discharge.   Respiratory: Negative for cough, shortness of breath and wheezing.    Cardiovascular: Negative for chest pain, palpitations, and + leg swelling.   Gastrointestinal: Negative for abdominal pain, constipation, diarrhea, nausea and vomiting.   Genitourinary: Negative for dysuria, frequency and urgency.   Musculoskeletal:  + generalized weakness. Negative for back pain and myalgias.   Skin: Negative for itching and rash.   Neurological: Negative for dizziness, speech change, and headaches.   Psychiatric/Behavioral: Negative for depression. The patient is not nervous/anxious.      PEx     Constitutional: Patient appears well-developed and in no distress   Head: Normocephalic and atraumatic.   Eyes: Pupils are equal, round, and reactive to light.   Neck: Normal range of motion. Neck supple.   Cardiovascular: Normal rate, regular rhythm and normal heart sounds, +lower extremity edema.    Pulmonary/Chest: Effort normal and breath sounds are clear  Abdominal: Soft. Bowel sounds are normal.   Musculoskeletal: Normal range of motion.   Neurological: Alert and oriented to person, place, and time.   Psychiatric: Normal mood and affect. Behavior is normal.   Skin: Skin is warm and dry, + L  trochanter and L thigh surgical incisions    Wound evaluation completed on 1/13/20:    Wound location: L trochanter surgical incision  Present on admit: yes  Appearance of wound:  100% epithelial  Drainage characteristic: None  Wound length:9.18 cm   Wound width: 2.23 cm  Wound depth: 0 cm   Olena wound area: normal skin tone   Following: SANDIE NP- weekly. This NP monthly- last observed on (1/13/20), wound care RN weekly- last observed on (1/13/20)    Wound location: L thigh surgical incision  Present on admit: yes  Appearance of wound: 100 % epithelial   Drainage characteristic: None  Wound length:7.06 cm   Wound width: 2.27 cm  Wound depth: 0 cm   Olena wound area: normal skin tone   Following: SANDIE NP- weekly. This NP monthly- last observed on (1/13/20), wound care RN weekly- last observed on (1/13/20)      Assessment and Plan:    Aftercare of recent Closed displaced intertrochanteric fracture of left femur , ongoing  S/p CMN on 1/2  Debility r/t recent L femur fx, ongoing  -s/p CMN with ortho on 1/2. WBAT to LLE, lovenox for 28 days until January 31st post op for DVT ppx.  - Continue with PT/OT for gait training and strengthening and restoration of ADL's   - Encourage mobility, OOB in chair, and early ambulation as appropriate  - Fall precautions   - Monitor for bowel and bladder dysfunction  - Monitor for and prevent skin breakdown and pressure ulcers  - Continue DVT prophylaxis with Lovenox-end date 1/31  - WBAT to LLE  - Pain control: Tylenol PRN, Robaxin QID x 14 days  - BM regimen: Miralax PRN  - ortho f/u in 2 weeks for bandage removal on 1/21/20  -1/20 continue PT/OT, current pain regimen, will await ortho recs from appointment tomorrow    Pseudomonas UTI, resolved  Recent Hx of Leukocytosis, ongoing  -Unclear etiology of initial leukocytosis which resolved on admit, probably that was from stress response and rhabdo>Initial UA and CXR negative on admit.  -Confusion> UA +> UC + for pseudomonas>Tx with Rocephin,  transitioned to Cipro  -Continue Cipro-end date 1/14  -1/20 no further treatment indicated        Aftercare of recent fracture of proximal end of left humerus, ongoing  Aftercare of recent fall, ongoing  -X-ray showed: Acute proximal left humeral head/neck fracture  -Continue sling and swath, keep in place until ortho f/u at all times.   -Continue NWB to LUE   -1/20 continue current regimen    Lower extremity edema, ongoing  PAD, chronic  -1/13  bilateral LE on 1/2 showed mild to moderate peripheral artery disease  -1/20 initiate Lasix 20 mg p.o. q.d. times 14 days, continue to monitor    Continued    Recent Hx of Confusion, ongoing  Dementia without behavioral disturbance, ongoing  -Intermittent confusion hx of dementia / memory impairments>UTI, sundowning> deescalated robaxin to BID          Future Appointments   Date Time Provider Department Center   1/21/2020  1:30 PM [facility] XRORTHO1 485 LB LIMIT [facility] XRAYBAKARI Colon Ort   1/21/2020  1:45 PM [facility] XRORTHO1 485 LB LIMIT [facility] XRAYORT Amol Colon Ort   1/21/2020  2:00 PM Adithya Addison MD [facility] ORTHO Amol Tesfaye NP          Patient note was created using MModal Dictation.  Any errors in syntax or even information may not have been identified and edited on initial review prior to signing this note.

## 2020-01-20 ENCOUNTER — DOCUMENTATION ONLY (OUTPATIENT)
Dept: HEPATOLOGY | Facility: HOSPITAL | Age: 85
End: 2020-01-20

## 2020-01-21 ENCOUNTER — HOSPITAL ENCOUNTER (OUTPATIENT)
Dept: RADIOLOGY | Facility: HOSPITAL | Age: 85
Discharge: HOME | End: 2020-01-21
Attending: ORTHOPAEDIC SURGERY
Payer: MEDICARE

## 2020-01-21 ENCOUNTER — OFFICE VISIT (OUTPATIENT)
Dept: ORTHOPEDICS | Facility: CLINIC | Age: 85
End: 2020-01-21
Payer: MEDICARE

## 2020-01-21 DIAGNOSIS — S72.142D CLOSED DISPLACED INTERTROCHANTERIC FRACTURE OF LEFT FEMUR WITH ROUTINE HEALING, SUBSEQUENT ENCOUNTER: ICD-10-CM

## 2020-01-21 DIAGNOSIS — S42.212D CLOSED DISPLACED FRACTURE OF SURGICAL NECK OF LEFT HUMERUS WITH ROUTINE HEALING, UNSPECIFIED FRACTURE MORPHOLOGY, SUBSEQUENT ENCOUNTER: ICD-10-CM

## 2020-01-21 DIAGNOSIS — S72.142D CLOSED DISPLACED INTERTROCHANTERIC FRACTURE OF LEFT FEMUR WITH ROUTINE HEALING, SUBSEQUENT ENCOUNTER: Primary | ICD-10-CM

## 2020-01-21 PROCEDURE — 73552 XR FEMUR 2 VIEW LEFT: ICD-10-PCS | Mod: 26,LT,, | Performed by: RADIOLOGY

## 2020-01-21 PROCEDURE — 73030 X-RAY EXAM OF SHOULDER: CPT | Mod: 26,LT,, | Performed by: RADIOLOGY

## 2020-01-21 PROCEDURE — 99024 PR POST-OP FOLLOW-UP VISIT: ICD-10-PCS | Mod: POP,,, | Performed by: ORTHOPAEDIC SURGERY

## 2020-01-21 PROCEDURE — 99999 PR PBB SHADOW E&M-EST. PATIENT-LVL III: CPT | Mod: PBBFAC,,, | Performed by: ORTHOPAEDIC SURGERY

## 2020-01-21 PROCEDURE — 72170 XR PELVIS ROUTINE AP: ICD-10-PCS | Mod: 26,,, | Performed by: RADIOLOGY

## 2020-01-21 PROCEDURE — 72170 X-RAY EXAM OF PELVIS: CPT | Mod: 26,,, | Performed by: RADIOLOGY

## 2020-01-21 PROCEDURE — 73030 XR SHOULDER TRAUMA 3 VIEW LEFT: ICD-10-PCS | Mod: 26,LT,, | Performed by: RADIOLOGY

## 2020-01-21 PROCEDURE — 99024 POSTOP FOLLOW-UP VISIT: CPT | Mod: POP,,, | Performed by: ORTHOPAEDIC SURGERY

## 2020-01-21 PROCEDURE — 99999 PR PBB SHADOW E&M-EST. PATIENT-LVL III: ICD-10-PCS | Mod: PBBFAC,,, | Performed by: ORTHOPAEDIC SURGERY

## 2020-01-21 PROCEDURE — 73552 X-RAY EXAM OF FEMUR 2/>: CPT | Mod: 26,LT,, | Performed by: RADIOLOGY

## 2020-01-21 PROCEDURE — 72170 X-RAY EXAM OF PELVIS: CPT | Mod: TC

## 2020-01-21 PROCEDURE — 73552 X-RAY EXAM OF FEMUR 2/>: CPT | Mod: TC,LT

## 2020-01-21 PROCEDURE — 99213 OFFICE O/P EST LOW 20 MIN: CPT | Mod: PBBFAC,25 | Performed by: ORTHOPAEDIC SURGERY

## 2020-01-21 PROCEDURE — 73030 X-RAY EXAM OF SHOULDER: CPT | Mod: TC,LT

## 2020-01-21 RX ORDER — METHOCARBAMOL 500 MG/1
500 TABLET, FILM COATED ORAL 4 TIMES DAILY
COMMUNITY

## 2020-01-21 RX ORDER — CIPROFLOXACIN 250 MG/1
250 TABLET, FILM COATED ORAL 2 TIMES DAILY
COMMUNITY
End: 2022-07-03

## 2020-01-22 ENCOUNTER — TELEPHONE (OUTPATIENT)
Dept: ADMINISTRATIVE | Facility: CLINIC | Age: 85
End: 2020-01-22

## 2020-01-22 NOTE — TELEPHONE ENCOUNTER
SNF Clinical update received 1/24/2020       PT/OT/ST    *Transfer mobility (eg, from bed to chair)   Independent (patient can perform an activity safely and independently without any devices, physical assistance, or supervision)  Modified independent (patient requires the use of an assistive device or extra time to complete an activity, but is otherwise independent)  Contact guard assistance (patient can perform an activity independently, but needs constant physical touch to steady or guide to ensure safe performance)  Supervision (patient can perform an activity, but supervision is provided to address safety concerns)  Minimum assistance (patient can perform most of an activity (ie, approximately 75%), but requires limited assistance from one person for safe completion)  X   Moderate assistance (patient can perform about half the effort of an activity, but requires extensive assistance from one or more persons for safe performance of the other half)  Maximum assistance (patient has difficulty performing an activity, but can offer some assistance (eg, approximately 25% of effort) and is dependent on one or more people to assist for safe completion)  Dependent or unable (patient is unable to perform an activity or depends on 2 or more people to complete it)    *Ambulation inside (eg, within room, hallways, to toilet)   20ft RW   Independent  Modified independent  Contact guard assistance  Supervision  Minimum assistance  X    Moderate assistance X 2  Maximum assistance  Dependent or unable    *Ambulation outside (eg, getting in or out of buildings, walking on uneven surfaces) ______Ft  Independent  Modified independent  Contact guard assistance  Supervision  Minimum assistance  Moderate assistance  Maximum assistance  X     Dependent or unable    *Toileting self-management:   Independent  Modified independent  Contact guard assistance  Supervision  Minimum assistance  Moderate assistance  Maximum assistance          Dependent or unable- not addressed    *Nourishment self-management (ie, ability to feed self)   Independent  Modified independent  Supervision  Minimum assistance  Moderate assistance  Maximum assistance  Dependent or unable Not addressed     *Personal care self-management (eg, dressing, bathing, brushing teeth, washing hair)   Independent  Modified independent  Contact guard assistance  Supervision  Minimum assistance  Moderate assistance  X     Maximum assistance  Dependent or unable    Medication support (ie, preparing/taking all prescribed and over-the-counter medications reliably and safely, including correct dosage at correct times)   Not applicable  Independent  Modified independent  Supervision  Minimum assistance  Moderate assistance  X     Maximum assistance  not addressed    ADL adaptive devices self-management (ie, ability to manage putting on and/or removing braces, splints, and other adaptive devices)   Not applicable  Independent  Modified independent  Supervision  Minimum assistance  Moderate assistance  Maximum assistance  X     Dependent or unable  Not addressed          X    IQ performed for extension    X    Criteria Met    Criteria Not Met    SNF notified    Barriers to progress: Patinet only recenlty partially WB    New treatments:none    Projected length of stay/ expected discharge:none given  Discharge Disposition: Patient lives alone. Niece is not attempting to arrange sitters or will move in with patient.  Recommendations:

## 2020-01-24 NOTE — PROGRESS NOTES
[facility]                                 Skilled Nursing Facility                   Progress Note     Admit Date: 1/8/20  RUTH   Principal Problem:  Debility r/t recent L trochanter and L humerus fx 2/2 fall  HPI obtained from patient interview and chart review     Visit Date: 1/27/2020    Chief Complaint:  Re-evaluation of Lower extremity edema, BP/HR monitoring, orthopedic recommendations, PT/OT progression     HPI:   Ms. Arnold is a 89 yo female with no significant PMHx. She had a fall at home, resulted in a L trochanter, L humerus fracture. She is s/p CMN on 1/2 for L trochanter fracture. Ortho recommended for patient to wear L arm sling for humerus fracture. She also was diagnosed with a Pseudomonas UTI with IV Rocephin, then transitioned to PO Cipro. Patient experienced delirium during admit, she has a reported Hx of dementia, and was thought to be exacerbated by UTI and sundowning. Therapy recommended to send patient to skilled facility for rehab.     Patient will be treated at [facility] with PT and OT to improve functional status and ability to perform ADLs.     Interval history:  BP 96/58, HR 76, stable on current regimen. Patient started on Lasix during last visit on 1/20 for LE edema. LE edema has improved since medication started, continue regimen with end date of 02/04.  Blisters have decreased in size as well. WC RN at facility, will consult WC NP to follow closely. She continues to wear brace, pain controlled on current pain regimen.  Patient went to orthopedic appointment on 01/21, recommendations:  Weightbearing as tolerated to left upper extremity, return to clinic in 4 weeks for follow-up.  RD recently saw patient at facility, added promod an MVI to regimen.  Patient is medically stable.  Will continue to monitor and treat of chronic conditions.  Patient progressing slowly with PT/OT.    Past Medical History:  Patient has a past medical history of Dementia.    Past Surgical History: Patient has a past surgical history that includes Appendectomy; Tonsillectomy; and Insertion of intramedullary cesar (Left, 1/2/2020).    Social History: Patient reports that she has never smoked. She has never used smokeless tobacco. She reports that she drank alcohol. She reports that she does not use drugs.    Family History: family history is not on file.    Allergies: Patient has No Known Allergies.    ROS  Constitutional: Negative for fever or fatigue.   Eyes: Negative for blurred vision, double vision and discharge.   Respiratory: Negative for cough, shortness of breath and wheezing.    Cardiovascular: Negative for chest pain, palpitations, and + leg swelling.   Gastrointestinal: Negative for abdominal pain, constipation, diarrhea, nausea and vomiting.   Genitourinary: Negative for dysuria, frequency and urgency.   Musculoskeletal:  + generalized weakness. Negative for back pain and myalgias.   Skin: Negative for itching and rash.   Neurological: Negative for dizziness, speech change, and headaches.   Psychiatric/Behavioral: Negative for depression. The patient is not nervous/anxious.      PEx     Constitutional: Patient appears well-developed and in no distress   Head: Normocephalic and atraumatic.   Eyes: Pupils are equal, round, and reactive to light.   Neck: Normal range of motion. Neck supple.   Cardiovascular: Normal rate, regular rhythm and normal heart sounds, +lower extremity edema.    Pulmonary/Chest: Effort normal and breath sounds are clear  Abdominal: Soft. Bowel sounds are normal.   Musculoskeletal: Normal range of motion.   Neurological: Alert and oriented to person, place, and time.   Psychiatric: Normal mood and affect. Behavior is normal.   Skin: Skin is warm and dry, + L trochanter and L thigh surgical incisions    Wound evaluation completed on 1/27/20:    Wound location:  L heel DTI  Present on Admit: no  Appearance of  wound:  100 % epitheleal  Drainage characteristic: None  Wound length: 1.01 cm   Wound width: 1.54 cm  Wound depth: 0.91 cm   Olena wound area: normal skin tone   Following: Ortho NP- monthly. This NP weekly- last observed on (1/27/20), wound care RN weekly- last observed on (1/27/20)    Wound location: L trochanter surgical incision  Present on admit: yes  Appearance of wound:  100% epithelial  Drainage characteristic: None  Wound length:9.18 cm   Wound width: 2.23 cm  Wound depth: 0 cm   Olena wound area: normal skin tone   Following: WC NP- weekly. This NP monthly- last observed on (1/13/20), wound care RN weekly- last observed on (1/13/20)    Wound location: L thigh surgical incision  Present on admit: yes  Appearance of wound: 100 % epithelial   Drainage characteristic: None  Wound length:7.06 cm   Wound width: 2.27 cm  Wound depth: 0 cm   Olena wound area: normal skin tone   Following: WC NP- weekly. This NP monthly- last observed on (1/13/20), wound care RN weekly- last observed on (1/13/20)      Assessment and Plan:    Aftercare of recent Closed displaced intertrochanteric fracture of left femur , ongoing  S/p CMN on 1/2  Debility r/t recent L femur fx, ongoing  -s/p CMN with ortho on 1/2. WBAT to LLE, lovenox for 28 days until January 31st post op for DVT ppx.  - Continue with PT/OT for gait training and strengthening and restoration of ADL's   - Encourage mobility, OOB in chair, and early ambulation as appropriate  - Fall precautions   - Monitor for bowel and bladder dysfunction  - Monitor for and prevent skin breakdown and pressure ulcers  - Continue DVT prophylaxis with Lovenox-end date 1/31  - WBAT to LLE  - Pain control: Tylenol PRN, Robaxin QID x 14 days  - BM regimen: Miralax PRN  - ortho f/u in 2 weeks for bandage removal on 1/21/20  -1/27 continue PT/OT, current pain regimen, will await ortho recs from appointment tomorrow    Malnutrition, new  -1/27 RD initiated MVI and promod QD     Pseudomonas UTI,  resolved  Recent Hx of Leukocytosis, ongoing  -Unclear etiology of initial leukocytosis which resolved on admit, probably that was from stress response and rhabdo>Initial UA and CXR negative on admit.  -Confusion> UA +> UC + for pseudomonas>Tx with Rocephin, transitioned to Cipro  -Continue Cipro-end date 1/14  -1/27 no further treatment indicated        Aftercare of recent fracture of proximal end of left humerus, ongoing  Aftercare of recent fall, ongoing  -X-ray showed: Acute proximal left humeral head/neck fracture  -Continue sling and swath, keep in place until ortho f/u at all times.   -Continue NWB to LUE   -1/27 ortho recs: WBAT LUE, continue current regimen    Lower extremity edema, ongoing  PAD, chronic  -1/13  bilateral LE on 1/2 showed mild to moderate peripheral artery disease  -1/20 initiate Lasix 20 mg p.o. q.d. times 14 days, continue to monitor  -1/27 Continue Lasix, continue to monitor    Continued    Recent Hx of Confusion, ongoing  Dementia without behavioral disturbance, ongoing  -Intermittent confusion hx of dementia / memory impairments>UTI, sundowning> deescalated robaxin to BID          Future Appointments   Date Time Provider Department Center   2/18/2020  3:30 PM Philip Dooley NP [facility]THO Amol Tesfaye NP          Patient note was created using MModal Dictation.  Any errors in syntax or even information may not have been identified and edited on initial review prior to signing this note.

## 2020-01-27 ENCOUNTER — DOCUMENTATION ONLY (OUTPATIENT)
Dept: HEPATOLOGY | Facility: HOSPITAL | Age: 85
End: 2020-01-27

## 2020-01-27 ENCOUNTER — TELEPHONE (OUTPATIENT)
Dept: ADMINISTRATIVE | Facility: CLINIC | Age: 85
End: 2020-01-27

## 2020-02-03 ENCOUNTER — DOCUMENTATION ONLY (OUTPATIENT)
Dept: HEPATOLOGY | Facility: HOSPITAL | Age: 85
End: 2020-02-03

## 2020-02-03 NOTE — PROGRESS NOTES
[facility]                                 Skilled Nursing Facility                   Progress Note     Admit Date: 1/8/20  RUTH   Principal Problem:  Debility r/t recent L trochanter and L humerus fx 2/2 fall  HPI obtained from patient interview and chart review     Visit Date: 2/3/2020    Chief Complaint:  Re-evaluation of Lower extremity edema, BP/HR monitoring, PT/OT progression     HPI:   Ms. Arnold is a 89 yo female with no significant PMHx. She had a fall at home, resulted in a L trochanter, L humerus fracture. She is s/p CMN on 1/2 for L trochanter fracture. Ortho recommended for patient to wear L arm sling for humerus fracture. She also was diagnosed with a Pseudomonas UTI with IV Rocephin, then transitioned to PO Cipro. Patient experienced delirium during admit, she has a reported Hx of dementia, and was thought to be exacerbated by UTI and sundowning. Therapy recommended to send patient to skilled facility for rehab.     Patient will be treated at [facility] with PT and OT to improve functional status and ability to perform ADLs.     Interval history:  /75, HR 88,, stable on current regimen. Patient started on Lasix during visit on 1/20 for LE edema. LE edema has improved since medication started, continue regimen with end date of 02/04.  Blisters have resolved. WC RN at facility, will consult WC NP to follow closely. She continues to wear brace, pain controlled on current pain regimen. Patient is medically stable.  Will continue to monitor and treat of chronic conditions.  Patient progressing slowly with PT/OT.    Past Medical History: Patient has a past medical history of Dementia.    Past Surgical History: Patient has a past surgical history that includes Appendectomy; Tonsillectomy; and Insertion of intramedullary cesar (Left, 1/2/2020).    Social History: Patient reports that she has never smoked. She has never  used smokeless tobacco. She reports that she drank alcohol. She reports that she does not use drugs.    Family History: family history is not on file.    Allergies: Patient has No Known Allergies.    ROS  Constitutional: Negative for fever or fatigue.   Eyes: Negative for blurred vision, double vision and discharge.   Respiratory: Negative for cough, shortness of breath and wheezing.    Cardiovascular: Negative for chest pain, palpitations, and + leg swelling.   Gastrointestinal: Negative for abdominal pain, constipation, diarrhea, nausea and vomiting.   Genitourinary: Negative for dysuria, frequency and urgency.   Musculoskeletal:  + generalized weakness. Negative for back pain and myalgias.   Skin: Negative for itching and rash.   Neurological: Negative for dizziness, speech change, and headaches.   Psychiatric/Behavioral: Negative for depression. The patient is not nervous/anxious.      PEx     Constitutional: Patient appears well-developed and in no distress   Head: Normocephalic and atraumatic.   Eyes: Pupils are equal, round, and reactive to light.   Neck: Normal range of motion. Neck supple.   Cardiovascular: Normal rate, regular rhythm and normal heart sounds, +lower extremity edema.    Pulmonary/Chest: Effort normal and breath sounds are clear  Abdominal: Soft. Bowel sounds are normal.   Musculoskeletal: Normal range of motion.   Neurological: Alert and oriented to person, place, and time.   Psychiatric: Normal mood and affect. Behavior is normal.   Skin: Skin is warm and dry, + L trochanter and L thigh surgical incisions    Wound evaluation completed on 1/27/20:    Wound location:  L heel DTI  Present on Admit: no  Appearance of wound:  100 % epitheleal  Drainage characteristic: None  Wound length: 1.01 cm   Wound width: 1.54 cm  Wound depth: 0.91 cm   Olena wound area: normal skin tone   Following: Ortho NP- monthly. This NP weekly- last observed on (1/27/20), wound care RN weekly- last observed on  (1/27/20)    Wound location: L trochanter surgical incision  Present on admit: yes  Appearance of wound:  100% epithelial  Drainage characteristic: None  Wound length:9.18 cm   Wound width: 2.23 cm  Wound depth: 0 cm   Olena wound area: normal skin tone   Following: SANDIE NP- weekly. This NP monthly- last observed on (1/13/20), wound care RN weekly- last observed on (1/13/20)    Wound location: L thigh surgical incision  Present on admit: yes  Appearance of wound: 100 % epithelial   Drainage characteristic: None  Wound length:7.06 cm   Wound width: 2.27 cm  Wound depth: 0 cm   Olena wound area: normal skin tone   Following: SANDIE NP- weekly. This NP monthly- last observed on (1/13/20), wound care RN weekly- last observed on (1/13/20)      Assessment and Plan:    Aftercare of recent Closed displaced intertrochanteric fracture of left femur, ongoing  S/p CMN on 1/2  Debility r/t recent L femur fx, ongoing  -s/p CMN with ortho on 1/2. WBAT to LLE, lovenox for 28 days until January 31st post op for DVT ppx.  - Continue with PT/OT for gait training and strengthening and restoration of ADL's   - Encourage mobility, OOB in chair, and early ambulation as appropriate  - Fall precautions   - Monitor for bowel and bladder dysfunction  - Monitor for and prevent skin breakdown and pressure ulcers  - Continue DVT prophylaxis with Lovenox-end date 1/31  - WBAT to LLE  - Pain control: Tylenol PRN, Robaxin QID x 14 days  - BM regimen: Miralax PRN  - ortho f/u in 2 weeks for bandage removal on 1/21/20  -2/3 continue PT/OT, current pain regimen, will await ortho recs from appointment scheduled on 02/18        Aftercare of recent fracture of proximal end of left humerus, ongoing  Aftercare of recent fall, ongoing  -X-ray showed: Acute proximal left humeral head/neck fracture  -Continue sling and swath, keep in place PRN comfort per ortho recs on 1/28  -Continue NWB to LUE   -2/3 ortho recs: WBAT LUE, continue current regimen, next appt  2/18    Lower extremity edema, ongoing  PAD, chronic  -1/13  bilateral LE on 1/2 showed mild to moderate peripheral artery disease  -1/20 initiate Lasix 20 mg p.o. q.d. times 14 days, continue to monitor  -2/3 Continue Lasix, continue to monitor    Continued    Recent Hx of Confusion, ongoing  Dementia without behavioral disturbance, ongoing  -Intermittent confusion hx of dementia / memory impairments>UTI, sundowning> deescalated robaxin to BID     Malnutrition, new  -1/27 RD initiated MVI and promod QD         Pseudomonas UTI, resolved  Recent Hx of Leukocytosis, ongoing  -Unclear etiology of initial leukocytosis which resolved on admit, probably that was from stress response and rhabdo>Initial UA and CXR negative on admit.  -Confusion> UA +> UC + for pseudomonas>Tx with Rocephin, transitioned to Cipro         Future Appointments   Date Time Provider Department Center   2/18/2020  3:30 PM hPilip Dooley NP [facility] LYSSA Tesfaye NP          Patient note was created using MModal Dictation.  Any errors in syntax or even information may not have been identified and edited on initial review prior to signing this note.

## 2020-02-09 NOTE — PROGRESS NOTES
[facility]                                 Skilled Nursing Facility                   Progress Note     Admit Date: 1/8/20  RUTH   Principal Problem:  Debility r/t recent L trochanter and L humerus fx 2/2 fall  HPI obtained from patient interview and chart review     Visit Date: 2/10/2020    Chief Complaint:  Re-evaluation of Lower extremity edema, BP/HR monitoring, PT/OT progression     HPI:   Ms. Arnold is a 89 yo female with no significant PMHx. She had a fall at home, resulted in a L trochanter, L humerus fracture. She is s/p CMN on 1/2 for L trochanter fracture. Ortho recommended for patient to wear L arm sling for humerus fracture. She also was diagnosed with a Pseudomonas UTI with IV Rocephin, then transitioned to PO Cipro. Patient experienced delirium during admit, she has a reported Hx of dementia, and was thought to be exacerbated by UTI and sundowning. Therapy recommended to send patient to skilled facility for rehab.     Patient will be treated at [facility] with PT and OT to improve functional status and ability to perform ADLs.     Interval history:  /97, HR 82, most recent weight 126 lb, stable on current regimen.  Patient recently treated with Lasix for lower extremity edema which ended on 02/04, edema has improved some but still continues, likely needs daily dosing indefinitely for further management.  Will continue to monitor.  Patient continues to wear brace, pain controlled on current pain regimen. Patient is medically stable.  Will continue to monitor and treat of chronic conditions.  Patient progressing slowly with PT/OT.    Past Medical History: Patient has a past medical history of Dementia.    Past Surgical History: Patient has a past surgical history that includes Appendectomy; Tonsillectomy; and Insertion of intramedullary cesar (Left, 1/2/2020).    Social History: Patient reports that she has never  smoked. She has never used smokeless tobacco. She reports that she drank alcohol. She reports that she does not use drugs.    Family History: family history is not on file.    Allergies: Patient has No Known Allergies.    ROS  Constitutional: Negative for fever or fatigue.   Eyes: Negative for blurred vision, double vision and discharge.   Respiratory: Negative for cough, shortness of breath and wheezing.    Cardiovascular: Negative for chest pain, palpitations, and + leg swelling.   Gastrointestinal: Negative for abdominal pain, constipation, diarrhea, nausea and vomiting.   Genitourinary: Negative for dysuria, frequency and urgency.   Musculoskeletal:  + generalized weakness. Negative for back pain and myalgias.   Skin: Negative for itching and rash.   Neurological: Negative for dizziness, speech change, and headaches.   Psychiatric/Behavioral: Negative for depression. The patient is not nervous/anxious.      PEx     Constitutional: Patient appears well-developed and in no distress   Head: Normocephalic and atraumatic.   Eyes: Pupils are equal, round, and reactive to light.   Neck: Normal range of motion. Neck supple.   Cardiovascular: Normal rate, regular rhythm and normal heart sounds, +lower extremity edema.    Pulmonary/Chest: Effort normal and breath sounds are clear  Abdominal: Soft. Bowel sounds are normal.   Musculoskeletal: Normal range of motion.   Neurological: Alert and oriented to person, place, and time.   Psychiatric: Normal mood and affect. Behavior is normal.   Skin: Skin is warm and dry, + L trochanter and L thigh surgical incisions    Wound evaluation completed on 1/27/20:    Wound location:  L heel DTI  Present on Admit: no  Appearance of wound:  100 % epitheleal  Drainage characteristic: None  Wound length: 1.01 cm   Wound width: 1.54 cm  Wound depth: 0.91 cm   Olena wound area: normal skin tone   Following: Ortho NP- monthly. This NP weekly- last observed on (1/27/20), wound care RN weekly-  last observed on (1/27/20)    Wound location: L trochanter surgical incision  Present on admit: yes  Appearance of wound:  100% epithelial  Drainage characteristic: None  Wound length:9.18 cm   Wound width: 2.23 cm  Wound depth: 0 cm   Olena wound area: normal skin tone   Following: SANDIE NP- weekly. This NP monthly- last observed on (1/13/20), wound care RN weekly- last observed on (1/13/20)    Wound location: L thigh surgical incision  Present on admit: yes  Appearance of wound: 100 % epithelial   Drainage characteristic: None  Wound length:7.06 cm   Wound width: 2.27 cm  Wound depth: 0 cm   Olena wound area: normal skin tone   Following: SANDIE NP- weekly. This NP monthly- last observed on (1/13/20), wound care RN weekly- last observed on (1/13/20)      Assessment and Plan:    Aftercare of recent Closed displaced intertrochanteric fracture of left femur, ongoing  S/p CMN on 1/2  Debility r/t recent L femur fx, ongoing  -s/p CMN with ortho on 1/2. WBAT to LLE, lovenox for 28 days until January 31st post op for DVT ppx.  - Continue with PT/OT for gait training and strengthening and restoration of ADL's   - Encourage mobility, OOB in chair, and early ambulation as appropriate  - Fall precautions   - Monitor for bowel and bladder dysfunction  - Monitor for and prevent skin breakdown and pressure ulcers  - Continue DVT prophylaxis with Lovenox-end date 1/31  - WBAT to LLE  - Pain control: Tylenol PRN, Robaxin QID x 14 days  - BM regimen: Miralax PRN  - ortho f/u in 2 weeks for bandage removal on 1/21/20  -2/10 continue PT/OT, current pain regimen, will await ortho recs from appointment scheduled on 02/18        Aftercare of recent fracture of proximal end of left humerus, ongoing  Aftercare of recent fall, ongoing  -X-ray showed: Acute proximal left humeral head/neck fracture  -Continue sling and swath, keep in place PRN comfort per ortho recs on 1/28  -Continue NWB to LUE   -2/3 ortho recs: WBAT LUE, continue current regimen,  next appt 2/18  -2/10 continue current ortho recs    Lower extremity edema, ongoing  PAD, chronic  -1/13  bilateral LE on 1/2 showed mild to moderate peripheral artery disease  -1/20 initiate Lasix 20 mg p.o. q.d. times 14 days, continue to monitor  -2/10 initiate Lasix 20 mg p.o. q.d. indefinitely, continue to monitor    Continued    Recent Hx of Confusion, ongoing  Dementia without behavioral disturbance, ongoing  -Intermittent confusion hx of dementia / memory impairments>UTI, sundowning> deescalated robaxin to BID     Malnutrition, new  -1/27 RD initiated MVI and promod QD         Pseudomonas UTI, resolved  Recent Hx of Leukocytosis, ongoing  -Unclear etiology of initial leukocytosis which resolved on admit, probably that was from stress response and rhabdo>Initial UA and CXR negative on admit.  -Confusion> UA +> UC + for pseudomonas>Tx with Rocephin, transitioned to Cipro         Future Appointments   Date Time Provider Department Center   2/18/2020  3:30 PM Philip Dooley NP [facility] ORTHO Amol Tesfaye NP          Patient note was created using MModal Dictation.  Any errors in syntax or even information may not have been identified and edited on initial review prior to signing this note.

## 2020-02-10 ENCOUNTER — DOCUMENTATION ONLY (OUTPATIENT)
Dept: HEPATOLOGY | Facility: HOSPITAL | Age: 85
End: 2020-02-10

## 2020-02-14 NOTE — PROGRESS NOTES
[facility]                                 Skilled Nursing Facility                   Progress Note     Admit Date: 1/8/20  RUTH   Principal Problem:  Debility r/t recent L trochanter and L humerus fx 2/2 fall  HPI obtained from patient interview and chart review     Visit Date: 2/17/2020    Chief Complaint:  Lower extremity cellulitis, Ongoing confusion, Re-evaluation of Lower extremity edema, BP/HR monitoring, PT/OT progression     HPI:   Ms. Arnold is a 89 yo female with no significant PMHx. She had a fall at home, resulted in a L trochanter, L humerus fracture. She is s/p CMN on 1/2 for L trochanter fracture. Ortho recommended for patient to wear L arm sling for humerus fracture. She also was diagnosed with a Pseudomonas UTI with IV Rocephin, then transitioned to PO Cipro. Patient experienced delirium during admit, she has a reported Hx of dementia, and was thought to be exacerbated by UTI and sundowning. Therapy recommended to send patient to skilled facility for rehab.     Patient will be treated at [facility] with PT and OT to improve functional status and ability to perform ADLs.     Interval history:  /67, HR 74, stable on current regimen.  Patient has been afebrile.  Patient continues on daily Lasix for lower extremity edema. Patient has increase in lower extremity edema today, likely needs increase in diuretic. She does not have compression socks on at this time during visit.  Discussed with nursing. She has new erythema with areas of induration noted to bilateral lower extremities on the inner aspect of her legs with some new blister development.  It seems to be painful to touch.  Nursing reporting that patient has daily confusion as a baseline that has not seemed to improve throughout her stay, she is not on any dementia related medications at this time but would likely benefit.  Will continue to monitor.   Patient continues to wear brace, pain controlled on current pain regimen. Patient is medically stable.  Will continue to monitor and treat of chronic conditions.  Patient progressing slowly with PT/OT.    Past Medical History: Patient has a past medical history of Dementia.    Past Surgical History: Patient has a past surgical history that includes Appendectomy; Tonsillectomy; and Insertion of intramedullary cesar (Left, 1/2/2020).    Social History: Patient reports that she has never smoked. She has never used smokeless tobacco. She reports that she drank alcohol. She reports that she does not use drugs.    Family History: family history is not on file.    Allergies: Patient has No Known Allergies.    ROS  Constitutional: Negative for fever or fatigue.   Eyes: Negative for blurred vision, double vision and discharge.   Respiratory: Negative for cough, shortness of breath and wheezing.    Cardiovascular: Negative for chest pain, palpitations, and + leg swelling, ongoing, worsened.   Gastrointestinal: Negative for abdominal pain, constipation, diarrhea, nausea and vomiting.   Genitourinary: Negative for dysuria, frequency and urgency.   Musculoskeletal:  + generalized weakness. Negative for back pain and myalgias.   Skin: Negative for itching and rash, +erythema/induration noted to bilateral lower extremities.   Neurological: Negative for dizziness, speech change, and headaches.   Psychiatric/Behavioral: Negative for depression. The patient is not nervous/anxious, +confusion baseline.      PEx     Constitutional: Patient appears well-developed and in no distress   Head: Normocephalic and atraumatic.   Eyes: Pupils are equal, round, and reactive to light.   Neck: Normal range of motion. Neck supple.   Cardiovascular: Normal rate, regular rhythm and normal heart sounds, +lower extremity edema, ongoing, worsened.    Pulmonary/Chest: Effort normal and breath sounds are clear  Abdominal: Soft. Bowel sounds are normal.    Musculoskeletal: Normal range of motion.   Neurological: Alert and oriented to person, place, and time.   Psychiatric: Normal mood and affect. Behavior is normal, +confusion baseline.   Skin: Skin is warm and dry, + L trochanter and L thigh surgical incisions, +erythema/induration noted to bilateral lower extremities.       Wound evaluation completed on 1/27/20:    Wound location:  L heel DTI  Present on Admit: no  Appearance of wound:  100 % epitheleal  Drainage characteristic: None  Wound length: 1.01 cm   Wound width: 1.54 cm  Wound depth: 0.91 cm   Olena wound area: normal skin tone   Following: This NP weekly- last observed on (1/27/20), wound care RN weekly- last observed on (1/27/20)    Wound location: L trochanter surgical incision  Present on admit: yes  Appearance of wound:  100% epithelial  Drainage characteristic: None  Wound length:9.18 cm   Wound width: 2.23 cm  Wound depth: 0 cm   Olena wound area: normal skin tone   Following: SANDIE NP- weekly. This NP monthly- last observed on (1/13/20), wound care RN weekly- last observed on (1/13/20)    Wound location: L thigh surgical incision  Present on admit: yes  Appearance of wound: 100 % epithelial   Drainage characteristic: None  Wound length:7.06 cm   Wound width: 2.27 cm  Wound depth: 0 cm   Olena wound area: normal skin tone   Following: WC NP- weekly. This NP monthly- last observed on (1/13/20), wound care RN weekly- last observed on (1/13/20)      Assessment and Plan:    Bilateral lower extremity cellulitis, new  Lower extremity edema, ongoing, worsened   PAD, chronic  -1/13  bilateral LE on 1/2 showed mild to moderate peripheral artery disease  -1/20 initiate Lasix 20 mg p.o. q.d. times 14 days, continue to monitor  -2/10 initiate Lasix 20 mg p.o. q.d. indefinitely, continue to monitor  -2/17 initiate Augmentin 500/125 mg p.o. q.8 hours times 10 days for lower extremity cellulitis, initiate increase in Lasix to 40 mg p.o. q.d., initiate tubi  for  edema management    Recent Hx of Confusion, ongoing  Dementia without behavioral disturbance, ongoing  -Intermittent confusion hx of dementia / memory impairments>UTI, sundowning> deescalated robaxin to BID   -2/17 initiate Aricept 5 mg p.o. q.h.s. for better behavior control    Aftercare of recent Closed displaced intertrochanteric fracture of left femur, ongoing  S/p CMN on 1/2  Debility r/t recent L femur fx, ongoing  -s/p CMN with ortho on 1/2. WBAT to LLE, lovenox for 28 days until January 31st post op for DVT ppx.  - Continue with PT/OT for gait training and strengthening and restoration of ADL's   - Encourage mobility, OOB in chair, and early ambulation as appropriate  - Fall precautions   - Monitor for bowel and bladder dysfunction  - Monitor for and prevent skin breakdown and pressure ulcers  - Continue DVT prophylaxis with Lovenox-end date 1/31  - WBAT to LLE  - Pain control: Tylenol PRN, Robaxin QID x 14 days  - BM regimen: Miralax PRN  - ortho f/u in 2 weeks for bandage removal on 1/21/20  -2/17 continue PT/OT, current pain regimen, will await ortho recs from appointment scheduled on 02/18        Aftercare of recent fracture of proximal end of left humerus, ongoing  Aftercare of recent fall, ongoing  -X-ray showed: Acute proximal left humeral head/neck fracture  -Continue sling and swath, keep in place PRN comfort per ortho recs on 1/28  -Continue NWB to LUE   -2/3 ortho recs: WBAT LUE, continue current regimen, next appt 2/18  -2/17 continue current ortho recs    Continued    Malnutrition, new  -1/27 RD initiated MVI and promod QD         Pseudomonas UTI, resolved  Recent Hx of Leukocytosis, ongoing  -Unclear etiology of initial leukocytosis which resolved on admit, probably that was from stress response and rhabdo>Initial UA and CXR negative on admit.  -Confusion> UA +> UC + for pseudomonas>Tx with Rocephin, transitioned to Cipro         Future Appointments   Date Time Provider Department Center    2/18/2020  3:30 PM Philip Dooley NP [facility] Amol Tesfaye NP          Patient note was created using MModal Dictation.  Any errors in syntax or even information may not have been identified and edited on initial review prior to signing this note.

## 2020-02-17 ENCOUNTER — DOCUMENTATION ONLY (OUTPATIENT)
Dept: HEPATOLOGY | Facility: HOSPITAL | Age: 85
End: 2020-02-17

## 2020-02-18 ENCOUNTER — HOSPITAL ENCOUNTER (OUTPATIENT)
Dept: RADIOLOGY | Facility: HOSPITAL | Age: 85
Discharge: HOME | End: 2020-02-18
Attending: NURSE PRACTITIONER
Payer: MEDICARE

## 2020-02-18 ENCOUNTER — OFFICE VISIT (OUTPATIENT)
Dept: ORTHOPEDICS | Facility: CLINIC | Age: 85
End: 2020-02-18
Payer: MEDICARE

## 2020-02-18 VITALS — HEIGHT: 64 IN | BODY MASS INDEX: 20.47 KG/M2 | WEIGHT: 119.94 LBS

## 2020-02-18 DIAGNOSIS — S42.212D CLOSED DISPLACED FRACTURE OF SURGICAL NECK OF LEFT HUMERUS WITH ROUTINE HEALING, UNSPECIFIED FRACTURE MORPHOLOGY, SUBSEQUENT ENCOUNTER: ICD-10-CM

## 2020-02-18 DIAGNOSIS — M25.551 PAIN OF BOTH HIP JOINTS: Primary | ICD-10-CM

## 2020-02-18 DIAGNOSIS — M25.512 LEFT SHOULDER PAIN, UNSPECIFIED CHRONICITY: Primary | ICD-10-CM

## 2020-02-18 DIAGNOSIS — M25.552 PAIN OF BOTH HIP JOINTS: Primary | ICD-10-CM

## 2020-02-18 DIAGNOSIS — M25.551 PAIN OF BOTH HIP JOINTS: ICD-10-CM

## 2020-02-18 DIAGNOSIS — M25.512 LEFT SHOULDER PAIN, UNSPECIFIED CHRONICITY: ICD-10-CM

## 2020-02-18 DIAGNOSIS — M25.552 PAIN OF BOTH HIP JOINTS: ICD-10-CM

## 2020-02-18 DIAGNOSIS — Z98.890 POST-OPERATIVE STATE: Primary | ICD-10-CM

## 2020-02-18 DIAGNOSIS — S72.142D CLOSED DISPLACED INTERTROCHANTERIC FRACTURE OF LEFT FEMUR WITH ROUTINE HEALING, SUBSEQUENT ENCOUNTER: ICD-10-CM

## 2020-02-18 PROCEDURE — 72170 X-RAY EXAM OF PELVIS: CPT | Mod: 26,,, | Performed by: RADIOLOGY

## 2020-02-18 PROCEDURE — 72170 XR PELVIS ROUTINE AP: ICD-10-PCS | Mod: 26,,, | Performed by: RADIOLOGY

## 2020-02-18 PROCEDURE — 72170 X-RAY EXAM OF PELVIS: CPT | Mod: TC

## 2020-02-18 PROCEDURE — 99024 PR POST-OP FOLLOW-UP VISIT: ICD-10-PCS | Mod: POP,,, | Performed by: NURSE PRACTITIONER

## 2020-02-18 PROCEDURE — 99999 PR PBB SHADOW E&M-EST. PATIENT-LVL III: ICD-10-PCS | Mod: PBBFAC,,, | Performed by: NURSE PRACTITIONER

## 2020-02-18 PROCEDURE — 73030 XR SHOULDER TRAUMA 3 VIEW LEFT: ICD-10-PCS | Mod: 26,LT,, | Performed by: RADIOLOGY

## 2020-02-18 PROCEDURE — 73030 X-RAY EXAM OF SHOULDER: CPT | Mod: 26,LT,, | Performed by: RADIOLOGY

## 2020-02-18 PROCEDURE — 99213 OFFICE O/P EST LOW 20 MIN: CPT | Mod: PBBFAC,25 | Performed by: NURSE PRACTITIONER

## 2020-02-18 PROCEDURE — 99999 PR PBB SHADOW E&M-EST. PATIENT-LVL III: CPT | Mod: PBBFAC,,, | Performed by: NURSE PRACTITIONER

## 2020-02-18 PROCEDURE — 73030 X-RAY EXAM OF SHOULDER: CPT | Mod: TC,LT

## 2020-02-18 PROCEDURE — 99024 POSTOP FOLLOW-UP VISIT: CPT | Mod: POP,,, | Performed by: NURSE PRACTITIONER

## 2020-02-18 NOTE — PROGRESS NOTES
Ms. Arnold is here today for a post-operative visit after undergoing and IM nailing of her left hip secondary to fracture by Dr. Addison on 1/2/2020.  She also has a left proximal humerus fracture which was treated non-operative.  She is currently admitted to [facility].  She is here today with an CNA from the .  CNA reports patient gets therapy daily.    Interval History:  She reports that she is doing ok.  Pain is well controlled.  She is not taking pain medication.  She is getting therapy at the SNF where she currently resides.  No reports of injuries since her discharge or since last seen.  She denies fever, chills, and sweats since the time of the surgery.     Physical exam:  Patient is currently in a wheelchair, NAD seen.  She is alert but appears to have cognitive impairment.  She is able to flex and extend at the knee from 0-90 degrees with no assistance.  She requires 1 person assist to stand and has to hold on to maintain her balance.  Her left leg incisions are open to air and appears to be healing.  She has tactile stimulation to her left lower leg.  There is no calf tenderness.  She has 2-3+ pitting edema bilateral lower legs.      She is able to perform ROM to her left shoulder with assist.  0-130 degrees.  There is no pain in her left shoulder.  She has tactile stimulation to her left lower arm.  She has normal ROM at the left elbow, hand, wrist and fingers.      RADS: X-ray of her left shoulder and pelvis AP was obtained.  Her hip pinning and hardware appears to be in good position and fracture appears stable.  Left shoulder x-ray shows left proximal humerus and greater tuberosity fracture that appears stable.     Assessment:  Post-op visit (6 weeks)    Plan:    ICD-10-CM ICD-9-CM   1. Post-operative state Z98.890 V45.89   2. Closed displaced intertrochanteric fracture of left femur with routine healing, subsequent encounter S72.142D V54.13   3. Closed displaced fracture of surgical  neck of left humerus with routine healing, unspecified fracture morphology, subsequent encounter S42.212D V54.11     Current care, treatment plan, precautions, activity level/ modifications, limitations, rehabilitation exercises and proposed future treatment were discussed with the patient. We discussed the need to monitor for changes in symptoms and condition and report them to the physician.  Discussed importance of compliance with all appointments and follow up examinations.     - Pain medication: refill was not needed,   - Pain medication refill policy provided to patient for review, yes   - Discussed x-ray of her left shoulder with Dr. Addison, recommends to continue treatment plans as he had previously outlined.  - Patient is to return to clinic in 6 weeks  - At time x-ray of her AP pelvis, left femur, and left shoulder is needed  - Per Dr. Addison's last note, she is weight bear as tolerated and ROM as tolerated to LLE.  She is also weight bear as tolerated with ROM as tolerated to LUE shoulder, elbow, hand, and wrist.  Wear sling for comfort and may remove for ROMAT.  - Recommend nursing facility to consider other possibilities for her bilateral lower leg edema including nutritional deficiencies in this 89 year old patient.  In the meantime, recommend to continue with compression socks and elevate legs above heart when not in therapy.     If there are any questions prior to scheduled follow up, the patient was instructed to contact the office

## 2020-02-21 ENCOUNTER — TELEPHONE (OUTPATIENT)
Dept: ORTHOPEDICS | Facility: CLINIC | Age: 85
End: 2020-02-21

## 2020-02-23 NOTE — PROGRESS NOTES
[facility]                                 Skilled Nursing Facility                   Progress Note     Admit Date: 1/8/20  RUTH   Principal Problem:  Debility r/t recent L trochanter and L humerus fx 2/2 fall  HPI obtained from patient interview and chart review     Visit Date: 2/24/2020    Chief Complaint:  Re evaluation of Lower extremity cellulitis, re evaluation of confusion, Re-evaluation of Lower extremity edema, BP/HR monitoring, PT/OT progression     HPI:   Ms. Arnold is a 89 yo female with no significant PMHx. She had a fall at home, resulted in a L trochanter, L humerus fracture. She is s/p CMN on 1/2 for L trochanter fracture. Ortho recommended for patient to wear L arm sling for humerus fracture. She also was diagnosed with a Pseudomonas UTI with IV Rocephin, then transitioned to PO Cipro. Patient experienced delirium during admit, she has a reported Hx of dementia, and was thought to be exacerbated by UTI and sundowning. Therapy recommended to send patient to skilled facility for rehab.     Patient will be treated at [facility] with PT and OT to improve functional status and ability to perform ADLs.     Interval history:  /73, HR 79, stable on current regimen.  Patient has been afebrile.  Patient had increase in lower extremity edema as well as left lower extremity cellulitis during last visit on 02/17.  Started on Augmentin times 10 days for antibiotic coverage, also increased Lasix to 40 mg q.d. edema slightly improved, continues to need increased diuretic, continuing to wear tubi .  Patient went to orthopedic appointment on 02/18, x-rays completed, no changes in plan of care, follow-up in 6 weeks.  Patient continues with confusion since admit to facility, started on Aricept during last visit on 02/17 to try and improve mental cognition.  Will continue for now monitor, as she seems slightly improved in  her mental status since last visit. Pain controlled on current pain regimen. Patient is medically stable.  Will continue to monitor and treat of chronic conditions.  Patient progressing slowly with PT/OT.    Past Medical History: Patient has a past medical history of Dementia.    Past Surgical History: Patient has a past surgical history that includes Appendectomy; Tonsillectomy; and Insertion of intramedullary cesar (Left, 1/2/2020).    Social History: Patient reports that she has never smoked. She has never used smokeless tobacco. She reports that she drank alcohol. She reports that she does not use drugs.    Family History: family history is not on file.    Allergies: Patient has No Known Allergies.    ROS  Constitutional: Negative for fever or fatigue.   Eyes: Negative for blurred vision, double vision and discharge.   Respiratory: Negative for cough, shortness of breath and wheezing.    Cardiovascular: Negative for chest pain, palpitations, and + leg swelling, ongoing, slight improvement  Gastrointestinal: Negative for abdominal pain, constipation, diarrhea, nausea and vomiting.   Genitourinary: Negative for dysuria, frequency and urgency.   Musculoskeletal:  + generalized weakness. Negative for back pain and myalgias.   Skin: Negative for itching and rash, +erythema/induration noted to bilateral lower extremities, slight improvement.   Neurological: Negative for dizziness, speech change, and headaches.   Psychiatric/Behavioral: Negative for depression. The patient is not nervous/anxious, +confusion baseline, slight improvement.      PEx     Constitutional: Patient appears well-developed and in no distress   Head: Normocephalic and atraumatic.   Eyes: Pupils are equal, round, and reactive to light.   Neck: Normal range of motion. Neck supple.   Cardiovascular: Normal rate, regular rhythm and normal heart sounds, +lower extremity edema, ongoing, slight improvement  Pulmonary/Chest: Effort normal and breath sounds  are clear  Abdominal: Soft. Bowel sounds are normal.   Musculoskeletal: Normal range of motion.   Neurological: Alert and oriented to person, place, and time.   Psychiatric: Normal mood and affect. Behavior is normal, +confusion baseline, slight improvement.   Skin: Skin is warm and dry, + L trochanter and L thigh surgical incisions, +erythema/induration noted to bilateral lower extremities, slight improvement.       Wound evaluation completed on 1/27/20:    Wound location:  L heel DTI  Present on Admit: no  Appearance of wound:  100 % epitheleal  Drainage characteristic: None  Wound length: 1.01 cm   Wound width: 1.54 cm  Wound depth: 0.91 cm   Olena wound area: normal skin tone   Following: This NP weekly- last observed on (1/27/20), wound care RN weekly- last observed on (1/27/20)    Wound location: L trochanter surgical incision  Present on admit: yes  Appearance of wound:  100% epithelial  Drainage characteristic: None  Wound length:9.18 cm   Wound width: 2.23 cm  Wound depth: 0 cm   Olena wound area: normal skin tone   Following: SANDIE NP- weekly. This NP monthly- last observed on (1/13/20), wound care RN weekly- last observed on (1/13/20)    Wound location: L thigh surgical incision  Present on admit: yes  Appearance of wound: 100 % epithelial   Drainage characteristic: None  Wound length:7.06 cm   Wound width: 2.27 cm  Wound depth: 0 cm   Olena wound area: normal skin tone   Following: SANDIE NP- weekly. This NP monthly- last observed on (1/13/20), wound care RN weekly- last observed on (1/13/20)      Assessment and Plan:    Bilateral lower extremity cellulitis, new, ongoing  Lower extremity edema, ongoing, slight improvement  PAD, chronic  -1/13  bilateral LE on 1/2 showed mild to moderate peripheral artery disease  -1/20 initiate Lasix 20 mg p.o. q.d. times 14 days, continue to monitor  -2/10 initiate Lasix 20 mg p.o. q.d. indefinitely, continue to monitor  -2/17 initiate Augmentin 500/125 mg p.o. q.8 hours times  10 days for lower extremity cellulitis, initiate increase in Lasix to 40 mg p.o. q.d., initiate tubi  for edema management  -2/24 Continue remaining Augmentin dosing, continue increased Lasix dose, tubi  for edema management    Recent Hx of Confusion, ongoing  Dementia without behavioral disturbance, ongoing  -Intermittent confusion hx of dementia / memory impairments>UTI, sundowning> deescalated robaxin to BID   -2/17 initiate Aricept 5 mg p.o. q.h.s. for better behavior control  -2/24 Continue newly started Aricept     Aftercare of recent Closed displaced intertrochanteric fracture of left femur, ongoing  S/p CMN on 1/2  Debility r/t recent L femur fx, ongoing  -s/p CMN with ortho on 1/2. WBAT to LLE, lovenox for 28 days until January 31st post op for DVT ppx.  - Continue with PT/OT for gait training and strengthening and restoration of ADL's   - Encourage mobility, OOB in chair, and early ambulation as appropriate  - Fall precautions   - Monitor for bowel and bladder dysfunction  - Monitor for and prevent skin breakdown and pressure ulcers  - Continue DVT prophylaxis with Lovenox-end date 1/31  - WBAT to LLE  - Pain control: Tylenol PRN, Robaxin QID x 14 days  - BM regimen: Miralax PRN  - ortho f/u in 2 weeks for bandage removal on 1/21/20  -2/24 continue PT/OT, current pain regimen, Xrays completed at ortho appt on 2/18, no changes needed to plan of care, fu in 6 weeks        Aftercare of recent fracture of proximal end of left humerus, ongoing  Aftercare of recent fall, ongoing  -X-ray showed: Acute proximal left humeral head/neck fracture  -Continue sling and swath, keep in place PRN comfort per ortho recs on 1/28  -Continue NWB to LUE   -2/3 ortho recs: WBAT LUE, continue current regimen, next appt 2/18  -2/24 continue current ortho recs    Continued    Malnutrition, new  -1/27 RD initiated MVI and promod QD         Pseudomonas UTI, resolved  Recent Hx of Leukocytosis, ongoing  -Unclear etiology of  initial leukocytosis which resolved on admit, probably that was from stress response and rhabdo>Initial UA and CXR negative on admit.  -Confusion> UA +> UC + for pseudomonas>Tx with Rocephin, transitioned to Cipro         Future Appointments   Date Time Provider Department Center   3/31/2020  3:30 PM Philip Dooley NP [facility] LYSSA Tesfaye NP          Patient note was created using MModal Dictation.  Any errors in syntax or even information may not have been identified and edited on initial review prior to signing this note.

## 2020-02-24 ENCOUNTER — DOCUMENTATION ONLY (OUTPATIENT)
Dept: HEPATOLOGY | Facility: HOSPITAL | Age: 85
End: 2020-02-24

## 2020-02-28 NOTE — PROGRESS NOTES
[facility]                                 Skilled Nursing Facility                   Progress Note     Admit Date: 1/8/20  RUTH   Principal Problem:  Debility r/t recent L trochanter and L humerus fx 2/2 fall  HPI obtained from patient interview and chart review     Visit Date: 3/2/2020    Chief Complaint:  Antibiotic completion, re-evaluation of Lower extremity edema, re evaluation of confusion,BP/HR monitoring, PT/OT progression     HPI:   Ms. Arnold is a 89 yo female with no significant PMHx. She had a fall at home, resulted in a L trochanter, L humerus fracture. She is s/p CMN on 1/2 for L trochanter fracture. Ortho recommended for patient to wear L arm sling for humerus fracture. She also was diagnosed with a Pseudomonas UTI with IV Rocephin, then transitioned to PO Cipro. Patient experienced delirium during admit, she has a reported Hx of dementia, and was thought to be exacerbated by UTI and sundowning. Therapy recommended to send patient to skilled facility for rehab.     Patient will be treated at [facility] with PT and OT to improve functional status and ability to perform ADLs.     Interval history:  /73, HR 72, stable on current regimen.  Patient has been afebrile.  Patient continues with lower extremity edema, but it has improved, continuing on Lasix therapy plus compression therapy.  She was treated with Augmentin times 10 days for cellulitis, antibiotic has completed, no further treatment indicated.  Patient continues with baseline confusion but improved with newly started on Aricept during recent visit, will continue to monitor. Pain controlled on current pain regimen. Patient is medically stable.  Will continue to monitor and treat of chronic conditions.  Patient progressing slowly with PT/OT.    Past Medical History: Patient has a past medical history of Dementia.    Past Surgical History: Patient has a  past surgical history that includes Appendectomy; Tonsillectomy; and Insertion of intramedullary cesar (Left, 1/2/2020).    Social History: Patient reports that she has never smoked. She has never used smokeless tobacco. She reports that she drank alcohol. She reports that she does not use drugs.    Family History: family history is not on file.    Allergies: Patient has No Known Allergies.    ROS  Constitutional: Negative for fever or fatigue.   Eyes: Negative for blurred vision, double vision and discharge.   Respiratory: Negative for cough, shortness of breath and wheezing.    Cardiovascular: Negative for chest pain, palpitations, and + leg swelling, improving  Gastrointestinal: Negative for abdominal pain, constipation, diarrhea, nausea and vomiting.   Genitourinary: Negative for dysuria, frequency and urgency.   Musculoskeletal:  + generalized weakness. Negative for back pain and myalgias.   Skin: Negative for itching and rash, +erythema/induration noted to bilateral lower extremities, improved  Neurological: Negative for dizziness, speech change, and headaches.   Psychiatric/Behavioral: Negative for depression. The patient is not nervous/anxious, +confusion baseline, improving    PEx     Constitutional: Patient appears well-developed and in no distress   Head: Normocephalic and atraumatic.   Eyes: Pupils are equal, round, and reactive to light.   Neck: Normal range of motion. Neck supple.   Cardiovascular: Normal rate, regular rhythm and normal heart sounds, +lower extremity edema, ongoing, improving  Pulmonary/Chest: Effort normal and breath sounds are clear  Abdominal: Soft. Bowel sounds are normal.   Musculoskeletal: Normal range of motion.   Neurological: Alert and oriented to person, place, and time.   Psychiatric: Normal mood and affect. Behavior is normal, +confusion baseline, improving  Skin: Skin is warm and dry, + L trochanter and L thigh surgical incisions, +erythema/induration noted to bilateral  lower extremities, improved       Wound evaluation completed on 1/27/20:    Wound location:  L heel DTI  Present on Admit: no  Appearance of wound:  100 % epitheleal  Drainage characteristic: None  Wound length: 1.01 cm   Wound width: 1.54 cm  Wound depth: 0.91 cm   Olena wound area: normal skin tone   Following: This NP weekly- last observed on (1/27/20), wound care RN weekly- last observed on (1/27/20)    Wound location: L trochanter surgical incision  Present on admit: yes  Appearance of wound:  100% epithelial  Drainage characteristic: None  Wound length:9.18 cm   Wound width: 2.23 cm  Wound depth: 0 cm   Olena wound area: normal skin tone   Following: WC NP- weekly. This NP monthly- last observed on (1/13/20), wound care RN weekly- last observed on (1/13/20)    Wound location: L thigh surgical incision  Present on admit: yes  Appearance of wound: 100 % epithelial   Drainage characteristic: None  Wound length:7.06 cm   Wound width: 2.27 cm  Wound depth: 0 cm   Olena wound area: normal skin tone   Following: SANDIE NP- weekly. This NP monthly- last observed on (1/13/20), wound care RN weekly- last observed on (1/13/20)      Assessment and Plan:    Bilateral lower extremity cellulitis, improved  Lower extremity edema, ongoing, improving  PAD, chronic  -1/13  bilateral LE on 1/2 showed mild to moderate peripheral artery disease  -1/20 initiate Lasix 20 mg p.o. q.d. times 14 days, continue to monitor  -2/10 initiate Lasix 20 mg p.o. q.d. indefinitely, continue to monitor  -2/17 initiate Augmentin 500/125 mg p.o. q.8 hours times 10 days for lower extremity cellulitis, initiate increase in Lasix to 40 mg p.o. q.d., initiate tubi  for edema management  -2/24 Continue remaining Augmentin dosing  -3/2 continue increased Lasix dose, tubi  for edema management, Augmentin completed    Recent Hx of Confusion, ongoing, improving  Dementia without behavioral disturbance, ongoing  -Intermittent confusion hx of dementia /  memory impairments>UTI, sundowning> deescalated robaxin to BID   -2/17 initiate Aricept 5 mg p.o. q.h.s. for better behavior control  -3/2 Continue newly started Aricept     Aftercare of recent Closed displaced intertrochanteric fracture of left femur, ongoing  S/p CMN on 1/2  Debility r/t recent L femur fx, ongoing  -s/p CMN with ortho on 1/2. WBAT to LLE, lovenox for 28 days until January 31st post op for DVT ppx.  - Continue with PT/OT for gait training and strengthening and restoration of ADL's   - Encourage mobility, OOB in chair, and early ambulation as appropriate  - Fall precautions   - Monitor for bowel and bladder dysfunction  - Monitor for and prevent skin breakdown and pressure ulcers  - Continue DVT prophylaxis with Lovenox-end date 1/31  - WBAT to LLE  - Pain control: Tylenol PRN, Robaxin QID x 14 days  - BM regimen: Miralax PRN  - ortho f/u in 2 weeks for bandage removal on 3/31  -3/2 continue PT/OT, current pain regimen        Aftercare of recent fracture of proximal end of left humerus, ongoing  Aftercare of recent fall, ongoing  -X-ray showed: Acute proximal left humeral head/neck fracture  -Continue sling and swath, keep in place PRN comfort per ortho recs on 1/28  -Continue NWB to LUE   -2/3 ortho recs: WBAT LUE, continue current regimen, next appt 2/18  -3/2 continue current ortho recs    Continued    Malnutrition, new  -1/27 RD initiated MVI and promod QD         Pseudomonas UTI, resolved  Recent Hx of Leukocytosis, ongoing  -Unclear etiology of initial leukocytosis which resolved on admit, probably that was from stress response and rhabdo>Initial UA and CXR negative on admit.  -Confusion> UA +> UC + for pseudomonas>Tx with Rocephin, transitioned to Cipro         Future Appointments   Date Time Provider Department Center   3/31/2020  3:30 PM Philip Dooley NP [facility] ORTHO Amol Tesfaye NP          Patient note was created using MModal Dictation.  Any errors in syntax  or even information may not have been identified and edited on initial review prior to signing this note.

## 2020-03-02 ENCOUNTER — DOCUMENTATION ONLY (OUTPATIENT)
Dept: HEPATOLOGY | Facility: HOSPITAL | Age: 85
End: 2020-03-02

## 2020-03-05 ENCOUNTER — TELEPHONE (OUTPATIENT)
Dept: ORTHOPEDICS | Facility: CLINIC | Age: 85
End: 2020-03-05

## 2020-03-06 NOTE — PROGRESS NOTES
[facility]                                 Skilled Nursing Facility                   Progress Note     Admit Date: 1/8/20  RUTH   Principal Problem:  Debility r/t recent L trochanter and L humerus fx 2/2 fall  HPI obtained from patient interview and chart review     Visit Date: 3/9/2020    Chief Complaint:  Lower extremity edema monitoring, behavior monitoring, BP/HR monitoring, PT/OT progression, DC planning    HPI:   Ms. Arnold is a 89 yo female with no significant PMHx. She had a fall at home, resulted in a L trochanter, L humerus fracture. She is s/p CMN on 1/2 for L trochanter fracture. Ortho recommended for patient to wear L arm sling for humerus fracture. She also was diagnosed with a Pseudomonas UTI with IV Rocephin, then transitioned to PO Cipro. Patient experienced delirium during admit, she has a reported Hx of dementia, and was thought to be exacerbated by UTI and sundowning. Therapy recommended to send patient to skilled facility for rehab.     Patient will be treated at [facility] with PT and OT to improve functional status and ability to perform ADLs.     Interval history:  /67, HR 67, stable on current regimen.  Patient has been afebrile.  Patient continues with mild lower extremity edema, continuing on Lasix therapy plus compression therapy.  Patient's mentation seems to be somewhat improved with addition Aricept into regimen.  Pain controlled on current pain regimen. Patient is medically stable.  No changes needed at this time.  Patient scheduled for discharge on 03/11, she will likely need home health, therapy.  Discussed with nursing today.  Discharge planning completed.  Will continue to monitor and treat of chronic conditions.  Patient progressing slowly with PT/OT.    Past Medical History: Patient has a past medical history of Dementia.    Past Surgical History: Patient has a past surgical history that  includes Appendectomy; Tonsillectomy; and Insertion of intramedullary cesar (Left, 1/2/2020).    Social History: Patient reports that she has never smoked. She has never used smokeless tobacco. She reports that she drank alcohol. She reports that she does not use drugs.    Family History: family history is not on file.    Allergies: Patient has No Known Allergies.    ROS  Constitutional: Negative for fever or fatigue.   Eyes: Negative for blurred vision, double vision and discharge.   Respiratory: Negative for cough, shortness of breath and wheezing.    Cardiovascular: Negative for chest pain, palpitations, and + leg swelling, improving  Gastrointestinal: Negative for abdominal pain, constipation, diarrhea, nausea and vomiting.   Genitourinary: Negative for dysuria, frequency and urgency.   Musculoskeletal:  + generalized weakness. Negative for back pain and myalgias.   Skin: Negative for itching and rash  Neurological: Negative for dizziness, speech change, and headaches.   Psychiatric/Behavioral: Negative for depression. The patient is not nervous/anxious, +confusion baseline, improving    PEx     Constitutional: Patient appears well-developed and in no distress   Head: Normocephalic and atraumatic.   Eyes: Pupils are equal, round, and reactive to light.   Neck: Normal range of motion. Neck supple.   Cardiovascular: Normal rate, regular rhythm and normal heart sounds, +lower extremity edema, ongoing, improving  Pulmonary/Chest: Effort normal and breath sounds are clear  Abdominal: Soft. Bowel sounds are normal.   Musculoskeletal: Normal range of motion.   Neurological: Alert and oriented to person, place, and time.   Psychiatric: Normal mood and affect. Behavior is normal, +confusion baseline, improving  Skin: Skin is warm and dry, + L trochanter and L thigh surgical incisions, +erythema/induration noted to bilateral lower extremities, improved       Wound evaluation completed on 1/27/20:    Wound location:  L heel  DTI  Present on Admit: no  Appearance of wound:  100 % epitheleal  Drainage characteristic: None  Wound length: 1.01 cm   Wound width: 1.54 cm  Wound depth: 0.91 cm   Olena wound area: normal skin tone   Following: This NPlast observed on (1/27/20), wound care RN weekly    Wound location: L trochanter surgical incision  Present on admit: yes  Appearance of wound:  100% epithelial  Drainage characteristic: None  Wound length:9.18 cm   Wound width: 2.23 cm  Wound depth: 0 cm   Olena wound area: normal skin tone   Following: SANDIE NP- weekly. This NP- last observed on (1/13/20), wound care RN weekly    Wound location: L thigh surgical incision  Present on admit: yes  Appearance of wound: 100 % epithelial   Drainage characteristic: None  Wound length:7.06 cm   Wound width: 2.27 cm  Wound depth: 0 cm   Olena wound area: normal skin tone   Following: SANDIE NP- weekly. This NP- last observed on (1/13/20), wound care RN weekly      Assessment and Plan:    Discharge planning  -3/9 initiate order for HH, PT/OT nursing, nurse's aide, needed DME for upcoming discharge on 3/11    Lower extremity edema, ongoing, improving  PAD, chronic  Recent history of Bilateral lower extremity cellulitis, resolved  -1/13  bilateral LE on 1/2 showed mild to moderate peripheral artery disease  -1/20 initiate Lasix 20 mg p.o. q.d. times 14 days, continue to monitor  -2/10 initiate Lasix 20 mg p.o. q.d. indefinitely, continue to monitor  -2/17 initiate Augmentin 500/125 mg p.o. q.8 hours times 10 days for lower extremity cellulitis, initiate increase in Lasix to 40 mg p.o. q.d., initiate tubi  for edema management  -2/24 Continue remaining Augmentin dosing  -3/9 continue increased Lasix dose, tubi  for edema management    Recent Hx of Confusion, ongoing, improving  Dementia without behavioral disturbance, ongoing  -Intermittent confusion hx of dementia / memory impairments>UTI, sundowning> deescalated robaxin to BID   -2/17 initiate Aricept 5  mg p.o. q.h.s. for better behavior control  -3/9 Continue newly started Aricept     Aftercare of recent Closed displaced intertrochanteric fracture of left femur, ongoing  S/p CMN on 1/2  Debility r/t recent L femur fx, ongoing  -s/p CMN with ortho on 1/2. WBAT to LLE, lovenox for 28 days until January 31st post op for DVT ppx.  - Continue with PT/OT for gait training and strengthening and restoration of ADL's   - Encourage mobility, OOB in chair, and early ambulation as appropriate  - Fall precautions   - Monitor for bowel and bladder dysfunction  - Monitor for and prevent skin breakdown and pressure ulcers  - Continue DVT prophylaxis with Lovenox-end date 1/31  - WBAT to LLE  - Pain control: Tylenol PRN, Robaxin QID x 14 days  - BM regimen: Miralax PRN  - ortho f/u in 2 weeks for bandage removal on 3/31  -3/9 continue PT/OT, current pain regimen        Aftercare of recent fracture of proximal end of left humerus, ongoing  Aftercare of recent fall, ongoing  -X-ray showed: Acute proximal left humeral head/neck fracture  -Continue sling and swath, keep in place PRN comfort per ortho recs on 1/28  -Continue NWB to LUE   -2/3 ortho recs: WBAT LUE, continue current regimen, next appt 2/18  -3/9 continue current ortho recs    Continued    Malnutrition, new  -1/27 RD initiated MVI and promod QD         Pseudomonas UTI, resolved  Recent Hx of Leukocytosis, ongoing  -Unclear etiology of initial leukocytosis which resolved on admit, probably that was from stress response and rhabdo>Initial UA and CXR negative on admit.  -Confusion> UA +> UC + for pseudomonas>Tx with Rocephin, transitioned to Cipro         Future Appointments   Date Time Provider Department Center   3/31/2020  3:30 PM Philip Dooley NP [facility] ORTHO Amol Colon       Extended time visit:  Total time:  36 min  Start Time:  1300  End Time:  1336  Non face-to-face time:  20 min  Description of time:  Coordination of care for upcoming discharge, medication  reconciliation, changes to plan of care needed after discharge      Lisette Tesfaye NP          Patient note was created using MModal Dictation.  Any errors in syntax or even information may not have been identified and edited on initial review prior to signing this note.

## 2020-03-09 ENCOUNTER — DOCUMENTATION ONLY (OUTPATIENT)
Dept: HEPATOLOGY | Facility: HOSPITAL | Age: 85
End: 2020-03-09

## 2020-03-11 PROCEDURE — G0180 PR HOME HEALTH MD CERTIFICATION: ICD-10-PCS | Mod: ,,, | Performed by: INTERNAL MEDICINE

## 2020-03-11 PROCEDURE — G0180 MD CERTIFICATION HHA PATIENT: HCPCS | Mod: ,,, | Performed by: INTERNAL MEDICINE

## 2020-03-27 ENCOUNTER — TELEPHONE (OUTPATIENT)
Dept: ORTHOPEDICS | Facility: CLINIC | Age: 85
End: 2020-03-27

## 2020-03-27 NOTE — TELEPHONE ENCOUNTER
Received a call from Dionna at [facility] pt discharged about 2 wks ago.  Will need to contact pt's niece Chela regarding appointments       Spoke with Chela.    She will call our office when the restrictions are lifted from the COVID-19 situation to reschedule pt's follow up

## 2020-03-27 NOTE — TELEPHONE ENCOUNTER
Spoke with Dionna at [facility]  [phone]     She will have pt's nurse call me back with an update.   Also she will check on getting xrays done in house and a disc delivered to our office.

## 2020-03-30 ENCOUNTER — EXTERNAL HOME HEALTH (OUTPATIENT)
Dept: HOME HEALTH SERVICES | Facility: HOSPITAL | Age: 85
End: 2020-03-30
Payer: MEDICARE

## 2020-05-08 ENCOUNTER — TELEPHONE (OUTPATIENT)
Dept: ORTHOPEDICS | Facility: CLINIC | Age: 85
End: 2020-05-08

## 2020-07-22 ENCOUNTER — TELEPHONE (OUTPATIENT)
Dept: ORTHOPEDICS | Facility: CLINIC | Age: 85
End: 2020-07-22

## 2020-07-23 NOTE — TELEPHONE ENCOUNTER
Called and LVM in regards to rescheduling pt's appt. Ask pt to give me a call back to reschedule appt.

## 2021-10-20 ENCOUNTER — TELEPHONE (OUTPATIENT)
Dept: DERMATOLOGY | Facility: CLINIC | Age: 86
End: 2021-10-20

## 2021-11-29 ENCOUNTER — PROCEDURE VISIT (OUTPATIENT)
Dept: DERMATOLOGY | Facility: CLINIC | Age: 86
End: 2021-11-29
Payer: MEDICARE

## 2021-11-29 VITALS
HEIGHT: 64 IN | SYSTOLIC BLOOD PRESSURE: 154 MMHG | DIASTOLIC BLOOD PRESSURE: 85 MMHG | HEART RATE: 60 BPM | BODY MASS INDEX: 20.32 KG/M2 | WEIGHT: 119 LBS

## 2021-11-29 DIAGNOSIS — C44.41 BASAL CELL CARCINOMA OF LEFT SIDE OF NECK: Primary | ICD-10-CM

## 2021-11-29 PROCEDURE — 17312: ICD-10-PCS | Mod: S$PBB,,, | Performed by: DERMATOLOGY

## 2021-11-29 PROCEDURE — 17312 MOHS ADDL STAGE: CPT | Mod: PBBFAC | Performed by: DERMATOLOGY

## 2021-11-29 PROCEDURE — 17315 MOHS SURG ADDL BLOCK: CPT | Mod: PBBFAC | Performed by: DERMATOLOGY

## 2021-11-29 PROCEDURE — 13132 CMPLX RPR F/C/C/M/N/AX/G/H/F: CPT | Mod: PBBFAC | Performed by: DERMATOLOGY

## 2021-11-29 PROCEDURE — 99499 NO LOS: ICD-10-PCS | Mod: S$PBB,,, | Performed by: DERMATOLOGY

## 2021-11-29 PROCEDURE — 17311 MOHS 1 STAGE H/N/HF/G: CPT | Mod: S$PBB,,, | Performed by: DERMATOLOGY

## 2021-11-29 PROCEDURE — 99499 UNLISTED E&M SERVICE: CPT | Mod: S$PBB,,, | Performed by: DERMATOLOGY

## 2021-11-29 PROCEDURE — 17311 MOHS 1 STAGE H/N/HF/G: CPT | Mod: PBBFAC | Performed by: DERMATOLOGY

## 2021-11-29 PROCEDURE — 17311: ICD-10-PCS | Mod: S$PBB,,, | Performed by: DERMATOLOGY

## 2021-11-29 PROCEDURE — 13132 PR RECMPL WND HEAD,FAC,HAND 2.6-7.5 CM: ICD-10-PCS | Mod: S$PBB,51,, | Performed by: DERMATOLOGY

## 2021-11-29 PROCEDURE — 17315 MOHS SURG ADDL BLOCK: CPT | Mod: S$PBB,,, | Performed by: DERMATOLOGY

## 2021-11-29 PROCEDURE — 17312 MOHS ADDL STAGE: CPT | Mod: S$PBB,,, | Performed by: DERMATOLOGY

## 2021-11-29 PROCEDURE — 17315: ICD-10-PCS | Mod: S$PBB,,, | Performed by: DERMATOLOGY

## 2021-11-29 PROCEDURE — 13132 CMPLX RPR F/C/C/M/N/AX/G/H/F: CPT | Mod: S$PBB,51,, | Performed by: DERMATOLOGY

## 2021-11-29 RX ORDER — CEPHALEXIN 500 MG/1
500 CAPSULE ORAL EVERY 8 HOURS
Qty: 21 CAPSULE | Refills: 0 | Status: SHIPPED | OUTPATIENT
Start: 2021-11-29 | End: 2021-12-06

## 2021-12-06 ENCOUNTER — OFFICE VISIT (OUTPATIENT)
Dept: DERMATOLOGY | Facility: CLINIC | Age: 86
End: 2021-12-06
Payer: MEDICARE

## 2021-12-06 DIAGNOSIS — Z09 POSTOP CHECK: Primary | ICD-10-CM

## 2021-12-06 PROCEDURE — 99999 PR PBB SHADOW E&M-EST. PATIENT-LVL I: ICD-10-PCS | Mod: PBBFAC,,, | Performed by: DERMATOLOGY

## 2021-12-06 PROCEDURE — 99999 PR PBB SHADOW E&M-EST. PATIENT-LVL I: CPT | Mod: PBBFAC,,, | Performed by: DERMATOLOGY

## 2021-12-06 PROCEDURE — 99024 POSTOP FOLLOW-UP VISIT: CPT | Mod: POP,,, | Performed by: DERMATOLOGY

## 2021-12-06 PROCEDURE — 99024 PR POST-OP FOLLOW-UP VISIT: ICD-10-PCS | Mod: POP,,, | Performed by: DERMATOLOGY

## 2021-12-06 PROCEDURE — 99211 OFF/OP EST MAY X REQ PHY/QHP: CPT | Mod: PBBFAC | Performed by: DERMATOLOGY

## 2022-04-20 NOTE — PROGRESS NOTES
"[facility]  Orthopedics  Progress Note    Patient Name: Franny Arnold  MRN: [id number]  Admission Date: 1/1/2020  Hospital Length of Stay: 2 days  Attending Provider: Kamini Collado MD  Primary Care Provider: Dinesh Wylie MD  Follow-up For: Procedure(s) (LRB):  INSERTION, INTRAMEDULLARY ZAYNAB (Left)    Post-Operative Day: 1 Day Post-Op  Subjective:     Principal Problem:Closed displaced intertrochanteric fracture of left femur    Principal Orthopedic Problem: Same    Interval History: Patient seen and examined at bedside.  No acute events overnight.  Pain controlled.  Ready for PT today.    Review of patient's allergies indicates:  No Known Allergies    Current Facility-Administered Medications   Medication    acetaminophen tablet 650 mg    bisacodyl suppository 10 mg    enoxaparin injection 40 mg    melatonin tablet 6 mg    methocarbamol tablet 500 mg    mupirocin 2 % ointment 1 g    ondansetron injection 4 mg    polyethylene glycol packet 17 g    promethazine (PHENERGAN) 6.25 mg in dextrose 5 % 50 mL IVPB    ropivacaine (PF) 2 mg/ml (0.2%) infusion    sodium chloride 0.9% flush 10 mL    sodium chloride 0.9% flush 10 mL    sodium chloride 0.9% flush 10 mL    vitamin D 1000 units tablet 2,000 Units     Objective:     Vital Signs (Most Recent):  Temp: 98 °F (36.7 °C) (01/03/20 0501)  Pulse: 79 (01/03/20 0501)  Resp: 16 (01/03/20 0501)  BP: (!) 107/58 (01/03/20 0501)  SpO2: 95 % (01/03/20 0501) Vital Signs (24h Range):  Temp:  [97.9 °F (36.6 °C)-98.7 °F (37.1 °C)] 98 °F (36.7 °C)  Pulse:  [] 79  Resp:  [8-20] 16  SpO2:  [90 %-100 %] 95 %  BP: ()/(55-89) 107/58     Weight: 54.4 kg (120 lb)  Height: 5' 4" (162.6 cm)  Body mass index is 20.6 kg/m².      Intake/Output Summary (Last 24 hours) at 1/3/2020 0632  Last data filed at 1/2/2020 1800  Gross per 24 hour   Intake 1800 ml   Output 920 ml   Net 880 ml       Ortho/SPM Exam    Physical Exam:  NAD, A/O x 3.  Wound c/d/i with " Licha DC. Inappropriate Request    Refill Authorization Note   Faby Luna  is requesting a refill authorization.  Brief Assessment and Rationale for Refill:  Quick Discontinue  Medication Therapy Plan:    Pharmacy is requesting new scripts for the following medications without required information, (sig/ frequency/qty/etc)       Medication Reconciliation Completed:  No      Comments: Pharmacies have been requesting medications for patients without required information, (sig, frequency, qty, etc.). In addition, requests are sent for medication(s) pt. are currently not taking, and medications patients have never taken.    We have spoken to the pharmacies about these request types and advised their teams previously that we are unable to assess these New Script requests and require all details for these requests. This is a known issue and has been reported.        Note composed:5:34 AM 04/20/2022                 clean dressing.  No focal motor or sensory deficits noted.  LUE:   In sling and swath with ecchymosis  NVI      Significant Labs:   CBC:   Recent Labs   Lab 01/02/20  0355 01/02/20  1321 01/03/20  0137   WBC 17.59* 23.75* 15.19*   HGB 12.1 12.0 9.4*   HCT 37.7 40.2 30.9*    217 190     All pertinent labs within the past 24 hours have been reviewed.    Significant Imaging: I have reviewed and interpreted all pertinent imaging results/findings.    Assessment/Plan:     * Closed displaced intertrochanteric fracture of left femur  Franny Arnold is a 89 y.o. female s/p L femur CMN on 1/2/20 with L prox humerus fx.      - Weight bearing status: WBAT LLE and NWB LUE  - Pain control: Per APS  - Antibiotics: mateo op  - Hgb 9.4  - DVT Prophylaxis: lovenox , SCD's at all times when not ambulating.  - PT/OT  - Dispo: continue medical care with early mobilization           Fracture of proximal end of left humerus  Sling and swath, NWB [place]E          Hang Varghese MD  Orthopedics  [facility]

## 2022-06-02 ENCOUNTER — HOSPITAL ENCOUNTER (EMERGENCY)
Facility: HOSPITAL | Age: 87
Discharge: HOME | End: 2022-06-02
Attending: EMERGENCY MEDICINE
Payer: MEDICARE

## 2022-06-02 VITALS
TEMPERATURE: 99 F | WEIGHT: 120 LBS | SYSTOLIC BLOOD PRESSURE: 152 MMHG | OXYGEN SATURATION: 96 % | RESPIRATION RATE: 18 BRPM | BODY MASS INDEX: 20.49 KG/M2 | HEIGHT: 64 IN | DIASTOLIC BLOOD PRESSURE: 70 MMHG | HEART RATE: 83 BPM

## 2022-06-02 DIAGNOSIS — U07.1 LAB TEST POSITIVE FOR DETECTION OF COVID-19 VIRUS: Primary | ICD-10-CM

## 2022-06-02 DIAGNOSIS — R05.9 COUGH: ICD-10-CM

## 2022-06-02 DIAGNOSIS — U07.1 COVID-19: ICD-10-CM

## 2022-06-02 LAB
CTP QC/QA: YES
SARS-COV-2 RDRP RESP QL NAA+PROBE: POSITIVE

## 2022-06-02 PROCEDURE — 99284 EMERGENCY DEPT VISIT MOD MDM: CPT | Mod: CR,CS,, | Performed by: PHYSICIAN ASSISTANT

## 2022-06-02 PROCEDURE — U0002 COVID-19 LAB TEST NON-CDC: HCPCS | Performed by: PHYSICIAN ASSISTANT

## 2022-06-02 PROCEDURE — 99284 EMERGENCY DEPT VISIT MOD MDM: CPT | Mod: 25

## 2022-06-02 PROCEDURE — 99284 PR EMERGENCY DEPT VISIT,LEVEL IV: ICD-10-PCS | Mod: CR,CS,, | Performed by: PHYSICIAN ASSISTANT

## 2022-06-02 PROCEDURE — 93010 ELECTROCARDIOGRAM REPORT: CPT | Mod: ,,, | Performed by: INTERNAL MEDICINE

## 2022-06-02 PROCEDURE — 93005 ELECTROCARDIOGRAM TRACING: CPT

## 2022-06-02 PROCEDURE — 93010 EKG 12-LEAD: ICD-10-PCS | Mod: ,,, | Performed by: INTERNAL MEDICINE

## 2022-06-02 NOTE — DISCHARGE INSTRUCTIONS
Please follow-up with primary care physician this week.     Referral to infusion center has been placed.  Take Tylenol for pain or fever as needed.  Over-the-counter cough and cold medication.

## 2022-06-02 NOTE — ED PROVIDER NOTES
"Encounter Date: 6/2/2022       History     Chief Complaint   Patient presents with    covid positive     Positive test last night. Coughing and not eating. Daughter also covid positive (brought her in)     The history is provided by the patient, medical records and a relative. The history is limited by the condition of the patient. No  was used.      Franny Arnold is a 89 y.o. female with medical history of Dementia, Basal Cell Carcinoma presenting to the ED with the chief complaint of COVID complaints.               Review of patient's allergies indicates:  No Known Allergies  Past Medical History:   Diagnosis Date    Basal cell carcinoma     Dementia      Past Surgical History:   Procedure Laterality Date    APPENDECTOMY      INSERTION OF INTRAMEDULLARY ZAYNAB Left 1/2/2020    Procedure: INSERTION, INTRAMEDULLARY ZAYNAB;  Surgeon: Adithya Addison MD;  Location: [facility] OR [address] [place] [address];  Service: Orthopedics;  Laterality: Left;    TONSILLECTOMY       No family history on file.  Social History     Tobacco Use    Smoking status: Never Smoker    Smokeless tobacco: Never Used   Substance Use Topics    Alcohol use: Not Currently    Drug use: Never     Review of Systems    Physical Exam     Initial Vitals [06/02/22 1349]   BP Pulse Resp Temp SpO2   (!) 143/75 86 18 98.2 °F (36.8 °C) 96 %      MAP       --         Physical Exam    ED Course   Procedures  Labs Reviewed - No data to display       Imaging Results    None          Medications - No data to display                       Clinical Impression:    ***Please document a Clinical Impression and click the "Refresh" button to refresh your note and automatically pull in before signing.***           "

## 2022-06-02 NOTE — ED PROVIDER NOTES
Encounter Date: 6/2/2022       History     Chief Complaint   Patient presents with    covid positive     Positive test last night. Coughing and not eating. Daughter also covid positive (brought her in)     89-year-old female with history of dementia, basal cell carcinoma presents the ER for evaluation of COVID positive findings.  Patient here with daughter who states the patient tested positive at home for COVID.  Daughter states that she wanted patient to have the antibody infusion in the emergency room today.  Daughter states she also tested positive for COVID and underwent the infusion yesterday and felt better therefore wanted her mother to have the same.  Reports that symptoms started about 4 days ago with cough.  Patient has not had any difficulty breathing at home.  Has not complained of any chest pain.  She has not had any abdominal pain, nausea vomiting or diarrhea.  She has been eating and drinking well at home otherwise.  No prior cardiac history.  Does not have a history of COPD or asthma.        Review of patient's allergies indicates:  No Known Allergies  Past Medical History:   Diagnosis Date    Basal cell carcinoma     Dementia      Past Surgical History:   Procedure Laterality Date    APPENDECTOMY      INSERTION OF INTRAMEDULLARY ZAYNAB Left 1/2/2020    Procedure: INSERTION, INTRAMEDULLARY ZAYNAB;  Surgeon: Adithya Addison MD;  Location: [facility] OR [address] [place] [address];  Service: Orthopedics;  Laterality: Left;    TONSILLECTOMY       No family history on file.  Social History     Tobacco Use    Smoking status: Never Smoker    Smokeless tobacco: Never Used   Substance Use Topics    Alcohol use: Not Currently    Drug use: Never     Review of Systems   Constitutional: Negative for chills and fever.   HENT: Positive for congestion.    Eyes: Negative for visual disturbance.   Respiratory: Positive for cough. Negative for shortness of breath.    Cardiovascular: Negative for chest pain.   Gastrointestinal:  Negative for nausea and vomiting.   Genitourinary: Negative for dysuria and flank pain.   Musculoskeletal: Negative for myalgias.   Skin: Negative for rash.   Allergic/Immunologic: Negative for immunocompromised state.   Neurological: Negative for weakness and numbness.   Hematological: Does not bruise/bleed easily.   Psychiatric/Behavioral: Negative for confusion.       Physical Exam     Initial Vitals [06/02/22 1349]   BP Pulse Resp Temp SpO2   (!) 143/75 86 18 98.2 °F (36.8 °C) 96 %      MAP       --         Physical Exam    Vitals reviewed.  Constitutional: She appears well-developed and well-nourished. She is not diaphoretic. No distress.   HENT:   Head: Normocephalic and atraumatic.   Eyes: Conjunctivae and EOM are normal.   Neck: Neck supple.   Cardiovascular: Normal rate, regular rhythm, normal heart sounds and intact distal pulses.   Pulmonary/Chest: Breath sounds normal. No respiratory distress.   Abdominal: Abdomen is soft. She exhibits no distension. There is no abdominal tenderness. There is no rebound and no guarding.   Musculoskeletal:         General: Normal range of motion.      Cervical back: Neck supple.     Neurological: She is alert and oriented to person, place, and time.   Skin: Skin is warm.         ED Course   Procedures  Labs Reviewed   SARS-COV-2 RDRP GENE - Abnormal; Notable for the following components:       Result Value    POC Rapid COVID Positive (*)     All other components within normal limits    Narrative:     This test utilizes isothermal nucleic acid amplification   technology to detect the SARS-CoV-2 RdRp nucleic acid segment.   The analytical sensitivity (limit of detection) is 125 genome   equivalents/mL.   A POSITIVE result implies infection with the SARS-CoV-2 virus;   the patient is presumed to be contagious.     A NEGATIVE result means that SARS-CoV-2 nucleic acids are not   present above the limit of detection. A NEGATIVE result should be   treated as presumptive. It does  not rule out the possibility of   COVID-19 and should not be the sole basis for treatment decisions.   If COVID-19 is strongly suspected based on clinical and exposure   history, re-testing using an alternate molecular assay should be   considered.   This test is only for use under the Food and Drug   Administration s Emergency Use Authorization (EUA).   Commercial kits are provided by [employer].   Performance characteristics of the EUA have been independently   verified by [facility] Department of   Pathology and Laboratory Medicine.   _________________________________________________________________   The authorized Fact Sheet for Healthcare Providers and the authorized Fact   Sheet for Patients of the ID NOW COVID-19 are available on the FDA   website:     https://www.fda.gov/media/050123/download  https://www.fda.gov/media/995546/download                 Imaging Results          X-Ray Chest AP Portable (Final result)  Result time 06/02/22 15:46:15    Final result by Gabriel Mendoza MD (06/02/22 15:46:15)                 Impression:      Findings concerning for multifocal pneumonia from inflammatory or infectious process including atypical bacterial or viral etiology.  Short-term follow-up chest radiography after therapy is recommended to ensure resolution.    Trace left pleural effusion versus pleural thickening/scarring.      Electronically signed by: Gabriel Mendoza MD  Date:    06/02/2022  Time:    15:46             Narrative:    EXAMINATION:  XR CHEST AP PORTABLE    CLINICAL HISTORY:  COVID-19;    TECHNIQUE:  Single frontal view of the chest was performed.    COMPARISON:  Chest radiograph 01/01/2020    FINDINGS:  Patient is rotated.  Large body habitus.    Cardiomediastinal silhouette is midline and within normal limits for age noting calcification and slight tortuosity of the aorta.  Pulmonary vasculature and hilar contours are within normal limits.  Scattered linear opacities throughout  the lungs consistent with platelike scarring versus atelectasis.  The lungs are well expanded with bilateral diffuse nonspecific interstitial coarsening increased from prior.  Left costophrenic angle not well visualized suggesting pleural thickening/scarring versus trace pleural effusion.  Patchy subsegmental opacities at the lateral right mid to upper lung zone.  No large consolidation or pneumothorax.                                 Medications - No data to display        APC / Resident Notes:   Patient in the ER promptly upon arrival.  She is afebrile, no acute distress.  She is able to speak in complete full sentences without difficulty.  O2 saturation of 96% on room air.  No evidence of hypoxia at rest.  Heart and lung sounds normal.  Abdomen soft, nondistended, nontender.  She is nontoxic appearing.  Patient does have a COVID positive test today.  X-ray does reveal bilateral pneumonia consistent with COVID pneumonia.    EUA referral was placed for antibody infusion.  Patient does meet criteria for antibody infusion with a risk score of 3. COVID symptoms monitoring program ordered.    On reassessment patient is resting comfortably.  She does not have any respiratory distress at this time.  Requesting to be discharged home.  Advised to take Tylenol for pain if needed.  Advised cough and cold medication as needed.  Patient is to follow up with primary care physician in the next 2-3 days.  Patient and daughter were given strict return precautions the ED which mother was agreeable to.  Patient is otherwise stable for discharge at this time.    Disclaimer: This note has been generated using voice-recognition software. There may be typographical errors that have been missed during proof-reading.                   Clinical Impression:   Final diagnoses:  [U07.1] COVID-19  [R05.9] Cough  [U07.1] Lab test positive for detection of COVID-19 virus (Primary)          ED Disposition Condition    Discharge Stable        ED  Prescriptions     None        Follow-up Information     Follow up With Specialties Details Why Contact Info Additional Information    Dinesh Wylie MD Internal Medicine   [address] [facility] [address]  [phone]       [facility]   [address] [facility] [address] [phone] Entrance to the unit is in front of the [facility] very close to [facility]. Enter the driveway in front of the hospital close to the Emergency Department and drive past the front of the hospital and drive past the [facility]. At the last stop sign, you will see signage for the EUA and parking spots are available specifically for our unit. Upon arrival, call [phone] or walk up the ramp and ring the doorbell for entry. Appointment times are approximately 3 hours.  LISANDRA Gao PA-C  06/02/22 1640

## 2022-06-06 ENCOUNTER — INFUSION (OUTPATIENT)
Dept: INFECTIOUS DISEASES | Facility: HOSPITAL | Age: 87
End: 2022-06-06
Attending: INTERNAL MEDICINE
Payer: MEDICARE

## 2022-06-06 VITALS
TEMPERATURE: 99 F | HEART RATE: 81 BPM | WEIGHT: 120 LBS | HEIGHT: 64 IN | OXYGEN SATURATION: 92 % | BODY MASS INDEX: 20.49 KG/M2 | RESPIRATION RATE: 20 BRPM | SYSTOLIC BLOOD PRESSURE: 145 MMHG | DIASTOLIC BLOOD PRESSURE: 82 MMHG

## 2022-06-06 DIAGNOSIS — U07.1 LAB TEST POSITIVE FOR DETECTION OF COVID-19 VIRUS: ICD-10-CM

## 2022-06-06 DIAGNOSIS — U07.1 COVID-19: Primary | ICD-10-CM

## 2022-06-06 PROCEDURE — 63600175 PHARM REV CODE 636 W HCPCS: Performed by: INTERNAL MEDICINE

## 2022-06-06 PROCEDURE — M0222 HC IV INJECTION, BEBTELOVIMAB, INCL POST ADMIN MONIT: HCPCS | Performed by: INTERNAL MEDICINE

## 2022-06-06 RX ORDER — ONDANSETRON 4 MG/1
4 TABLET, ORALLY DISINTEGRATING ORAL
Status: ACTIVE | OUTPATIENT
Start: 2022-06-06 | End: 2022-06-07

## 2022-06-06 RX ORDER — ALBUTEROL SULFATE 90 UG/1
2 AEROSOL, METERED RESPIRATORY (INHALATION)
Status: ACTIVE | OUTPATIENT
Start: 2022-06-06 | End: 2022-06-09

## 2022-06-06 RX ORDER — EPINEPHRINE 0.3 MG/.3ML
0.3 INJECTION SUBCUTANEOUS
Status: ACTIVE | OUTPATIENT
Start: 2022-06-06 | End: 2022-06-09

## 2022-06-06 RX ORDER — DIPHENHYDRAMINE HYDROCHLORIDE 50 MG/ML
25 INJECTION INTRAMUSCULAR; INTRAVENOUS
Status: ACTIVE | OUTPATIENT
Start: 2022-06-06 | End: 2022-06-07

## 2022-06-06 RX ORDER — ACETAMINOPHEN 325 MG/1
650 TABLET ORAL
Status: ACTIVE | OUTPATIENT
Start: 2022-06-06 | End: 2022-06-07

## 2022-06-06 RX ORDER — BEBTELOVIMAB 87.5 MG/ML
175 INJECTION, SOLUTION INTRAVENOUS
Status: COMPLETED | OUTPATIENT
Start: 2022-06-06 | End: 2022-06-06

## 2022-06-06 RX ADMIN — BEBTELOVIMAB 175 MG: 87.5 INJECTION, SOLUTION INTRAVENOUS at 01:06

## 2022-06-06 NOTE — PROGRESS NOTES
Patient remains with no signs of complications noted. Patient received Bebtelovimab IV Injection according to FDA recommendations and [facility] SOP without complications noted and left with mask in place. Drug fact sheet provided. Pt discharged home. Ambulatory. Remains AAox3. No distress noted. RR even and unlabored.      1405

## 2022-06-06 NOTE — PROGRESS NOTES
Patient arrives for Bebtelovimab IV Injection. Ambulatory. Pt AAox3. No distress noted. RR even and unlabored.     Symptoms and onset date:  Cough, fever on 06/02/22    Tested COVID + on 06/02/22

## 2022-06-06 NOTE — PROGRESS NOTES
1305-    Bebtelovimab IV Injection administered. Pt tolerated injection well. No S/S of injection reaction noted at present time. One hour observation started.

## 2022-07-03 ENCOUNTER — HOSPITAL ENCOUNTER (EMERGENCY)
Facility: HOSPITAL | Age: 87
Discharge: HOME | End: 2022-07-03
Attending: EMERGENCY MEDICINE
Payer: MEDICARE

## 2022-07-03 VITALS
TEMPERATURE: 99 F | OXYGEN SATURATION: 97 % | HEART RATE: 66 BPM | DIASTOLIC BLOOD PRESSURE: 70 MMHG | RESPIRATION RATE: 16 BRPM | WEIGHT: 108 LBS | BODY MASS INDEX: 18.54 KG/M2 | SYSTOLIC BLOOD PRESSURE: 145 MMHG

## 2022-07-03 DIAGNOSIS — N30.01 ACUTE CYSTITIS WITH HEMATURIA: Primary | ICD-10-CM

## 2022-07-03 LAB
ALBUMIN SERPL BCP-MCNC: 3.2 G/DL (ref 3.5–5.2)
ALP SERPL-CCNC: 71 U/L (ref 55–135)
ALT SERPL W/O P-5'-P-CCNC: 12 U/L (ref 10–44)
ANION GAP SERPL CALC-SCNC: 10 MMOL/L (ref 8–16)
AST SERPL-CCNC: 13 U/L (ref 10–40)
BACTERIA #/AREA URNS AUTO: ABNORMAL /HPF
BASOPHILS # BLD AUTO: 0.04 K/UL (ref 0–0.2)
BASOPHILS NFR BLD: 0.5 % (ref 0–1.9)
BILIRUB SERPL-MCNC: 0.5 MG/DL (ref 0.1–1)
BILIRUB UR QL STRIP: NEGATIVE
BUN SERPL-MCNC: 14 MG/DL (ref 10–30)
CALCIUM SERPL-MCNC: 9.1 MG/DL (ref 8.7–10.5)
CHLORIDE SERPL-SCNC: 110 MMOL/L (ref 95–110)
CLARITY UR REFRACT.AUTO: ABNORMAL
CO2 SERPL-SCNC: 25 MMOL/L (ref 23–29)
COLOR UR AUTO: YELLOW
CREAT SERPL-MCNC: 0.8 MG/DL (ref 0.5–1.4)
DIFFERENTIAL METHOD: ABNORMAL
EOSINOPHIL # BLD AUTO: 0.1 K/UL (ref 0–0.5)
EOSINOPHIL NFR BLD: 1 % (ref 0–8)
ERYTHROCYTE [DISTWIDTH] IN BLOOD BY AUTOMATED COUNT: 15.1 % (ref 11.5–14.5)
EST. GFR  (AFRICAN AMERICAN): >60 ML/MIN/1.73 M^2
EST. GFR  (NON AFRICAN AMERICAN): >60 ML/MIN/1.73 M^2
GLUCOSE SERPL-MCNC: 99 MG/DL (ref 70–110)
GLUCOSE UR QL STRIP: NEGATIVE
HCT VFR BLD AUTO: 47.7 % (ref 37–48.5)
HGB BLD-MCNC: 14.4 G/DL (ref 12–16)
HGB UR QL STRIP: ABNORMAL
HYALINE CASTS UR QL AUTO: 0 /LPF
IMM GRANULOCYTES # BLD AUTO: 0.08 K/UL (ref 0–0.04)
IMM GRANULOCYTES NFR BLD AUTO: 1 % (ref 0–0.5)
KETONES UR QL STRIP: NEGATIVE
LEUKOCYTE ESTERASE UR QL STRIP: ABNORMAL
LYMPHOCYTES # BLD AUTO: 1.9 K/UL (ref 1–4.8)
LYMPHOCYTES NFR BLD: 23.7 % (ref 18–48)
MCH RBC QN AUTO: 27.2 PG (ref 27–31)
MCHC RBC AUTO-ENTMCNC: 30.2 G/DL (ref 32–36)
MCV RBC AUTO: 90 FL (ref 82–98)
MICROSCOPIC COMMENT: ABNORMAL
MONOCYTES # BLD AUTO: 0.5 K/UL (ref 0.3–1)
MONOCYTES NFR BLD: 6.7 % (ref 4–15)
NEUTROPHILS # BLD AUTO: 5.2 K/UL (ref 1.8–7.7)
NEUTROPHILS NFR BLD: 67.1 % (ref 38–73)
NITRITE UR QL STRIP: POSITIVE
NON-SQ EPI CELLS #/AREA URNS AUTO: 4 /HPF
NRBC BLD-RTO: 0 /100 WBC
PH UR STRIP: 6 [PH] (ref 5–8)
PLATELET # BLD AUTO: 240 K/UL (ref 150–450)
PMV BLD AUTO: 9.2 FL (ref 9.2–12.9)
POTASSIUM SERPL-SCNC: 4.2 MMOL/L (ref 3.5–5.1)
PROT SERPL-MCNC: 6.5 G/DL (ref 6–8.4)
PROT UR QL STRIP: ABNORMAL
RBC # BLD AUTO: 5.29 M/UL (ref 4–5.4)
RBC #/AREA URNS AUTO: >100 /HPF (ref 0–4)
SODIUM SERPL-SCNC: 145 MMOL/L (ref 136–145)
SP GR UR STRIP: 1.02 (ref 1–1.03)
SQUAMOUS #/AREA URNS AUTO: 13 /HPF
URN SPEC COLLECT METH UR: ABNORMAL
WBC # BLD AUTO: 7.8 K/UL (ref 3.9–12.7)
WBC #/AREA URNS AUTO: >100 /HPF (ref 0–5)
WBC CLUMPS UR QL AUTO: ABNORMAL

## 2022-07-03 PROCEDURE — 87077 CULTURE AEROBIC IDENTIFY: CPT | Performed by: PHYSICIAN ASSISTANT

## 2022-07-03 PROCEDURE — 81001 URINALYSIS AUTO W/SCOPE: CPT | Performed by: PHYSICIAN ASSISTANT

## 2022-07-03 PROCEDURE — 96361 HYDRATE IV INFUSION ADD-ON: CPT

## 2022-07-03 PROCEDURE — 87086 URINE CULTURE/COLONY COUNT: CPT | Performed by: PHYSICIAN ASSISTANT

## 2022-07-03 PROCEDURE — 99284 EMERGENCY DEPT VISIT MOD MDM: CPT | Mod: ,,, | Performed by: EMERGENCY MEDICINE

## 2022-07-03 PROCEDURE — 96360 HYDRATION IV INFUSION INIT: CPT

## 2022-07-03 PROCEDURE — 99284 EMERGENCY DEPT VISIT MOD MDM: CPT | Mod: 25

## 2022-07-03 PROCEDURE — 87088 URINE BACTERIA CULTURE: CPT | Performed by: PHYSICIAN ASSISTANT

## 2022-07-03 PROCEDURE — 87186 SC STD MICRODIL/AGAR DIL: CPT | Performed by: PHYSICIAN ASSISTANT

## 2022-07-03 PROCEDURE — 85025 COMPLETE CBC W/AUTO DIFF WBC: CPT | Performed by: PHYSICIAN ASSISTANT

## 2022-07-03 PROCEDURE — 99284 PR EMERGENCY DEPT VISIT,LEVEL IV: ICD-10-PCS | Mod: ,,, | Performed by: EMERGENCY MEDICINE

## 2022-07-03 PROCEDURE — 25000003 PHARM REV CODE 250: Performed by: PHYSICIAN ASSISTANT

## 2022-07-03 PROCEDURE — 80053 COMPREHEN METABOLIC PANEL: CPT | Performed by: PHYSICIAN ASSISTANT

## 2022-07-03 RX ORDER — CIPROFLOXACIN 500 MG/1
500 TABLET ORAL
Status: COMPLETED | OUTPATIENT
Start: 2022-07-03 | End: 2022-07-03

## 2022-07-03 RX ORDER — CIPROFLOXACIN 500 MG/1
500 TABLET ORAL 2 TIMES DAILY
Qty: 14 TABLET | Refills: 0 | Status: SHIPPED | OUTPATIENT
Start: 2022-07-03 | End: 2022-07-10

## 2022-07-03 RX ADMIN — CIPROFLOXACIN 500 MG: 500 TABLET, FILM COATED ORAL at 03:07

## 2022-07-03 RX ADMIN — SODIUM CHLORIDE 500 ML: 0.9 INJECTION, SOLUTION INTRAVENOUS at 11:07

## 2022-07-03 NOTE — ED NOTES
Pt on continuous cardiac monitoring, continuous pulse oximetry, and automatic BP cuff. Pt in hospital gown, side rails up X2, bed low and locked, and call light is placed within reach. Daughter at bedside at this time. Pt denies any complaints or needs.

## 2022-07-03 NOTE — ED PROVIDER NOTES
"Encounter Date: 7/3/2022       History     Chief Complaint   Patient presents with    Hematuria     Pt's daughter saw blood in pad this morning, blood in urine, also not urinating much.    Dehydration     Patient is a 89yoF who presents for episode of bleeding in underwear; PMHx dementia, appendectomy. HPI supplemented by family at bedside, whom patient lives with. They note seeing a small amount of "what looked like period blood" in patients underwear this morning. Also note blood in urine two days  Ago. Family reports good PO intake over the past few days (poor clear liquid intake), normal BMs. No emesis, fever nor complaints of pain. No blood thinner use, no h/o GIB. Patient is nulliparous. No episodes of weakness.  Patient has no complaints.  The patients available PMH, PSH, Social History, medications, allergies, and triage vital signs were reviewed just prior to their medical evaluation.          Review of patient's allergies indicates:  No Known Allergies  Past Medical History:   Diagnosis Date    Basal cell carcinoma     Dementia      Past Surgical History:   Procedure Laterality Date    APPENDECTOMY      INSERTION OF INTRAMEDULLARY ZAYNAB Left 1/2/2020    Procedure: INSERTION, INTRAMEDULLARY ZAYNAB;  Surgeon: Adithya Addison MD;  Location: [facility] OR [address] [place] [address];  Service: Orthopedics;  Laterality: Left;    TONSILLECTOMY       No family history on file.  Social History     Tobacco Use    Smoking status: Never Smoker    Smokeless tobacco: Never Used   Substance Use Topics    Alcohol use: Not Currently    Drug use: Never     Review of Systems   Unable to perform ROS: Dementia       Physical Exam     Initial Vitals [07/03/22 1027]   BP Pulse Resp Temp SpO2   (!) 131/59 (!) 59 18 97.9 °F (36.6 °C) 97 %      MAP       --         Physical Exam    Nursing note and vitals reviewed.  Constitutional: She appears well-developed and well-nourished. She is not diaphoretic. No distress.   HENT:   Head: " Normocephalic and atraumatic.   Right Ear: External ear normal.   Left Ear: External ear normal.   Mouth/Throat: Oropharynx is clear and moist.   Eyes: Conjunctivae and EOM are normal. Pupils are equal, round, and reactive to light. No scleral icterus.   Cardiovascular: Normal rate, regular rhythm and intact distal pulses.   Pulmonary/Chest: Breath sounds normal. No respiratory distress. She has no wheezes. She has no rhonchi. She has no rales.   Abdominal: Abdomen is soft. Bowel sounds are normal. There is no abdominal tenderness. There is no rebound and no guarding.   Genitourinary: Rectum:      Guaiac result negative.   Guaiac negative stool.    Genitourinary Comments: Vaginal atrophy with mild erythema to canal entrance. No active bleeding  No blood in pad  Soft brown stool in rectal vault    Chaperone present     Musculoskeletal:         General: No edema. Normal range of motion.     Neurological: She is alert. She has normal strength. No cranial nerve deficit or sensory deficit.   Not oriented to situation or why she is here  Pleasantly demented   Skin: Skin is warm and dry. Capillary refill takes less than 2 seconds. No rash noted. No erythema. No pallor.   Psychiatric: She has a normal mood and affect. Her behavior is normal. Judgment and thought content normal.         ED Course   Procedures  Labs Reviewed   COMPREHENSIVE METABOLIC PANEL - Abnormal; Notable for the following components:       Result Value    Albumin 3.2 (*)     All other components within normal limits   CBC W/ AUTO DIFFERENTIAL - Abnormal; Notable for the following components:    MCHC 30.2 (*)     RDW 15.1 (*)     Immature Granulocytes 1.0 (*)     Immature Grans (Abs) 0.08 (*)     All other components within normal limits   URINALYSIS, REFLEX TO URINE CULTURE - Abnormal; Notable for the following components:    Appearance, UA Cloudy (*)     Protein, UA 2+ (*)     Occult Blood UA 3+ (*)     Nitrite, UA Positive (*)     Leukocytes, UA 3+ (*)      All other components within normal limits   URINALYSIS MICROSCOPIC - Abnormal; Notable for the following components:    RBC, UA >100 (*)     WBC, UA >100 (*)     WBC Clumps, UA Occasional (*)     Bacteria Moderate (*)     Non-Squam Epith 4 (*)     All other components within normal limits   CULTURE, URINE          Imaging Results    None          Medications   sodium chloride 0.9% bolus 500 mL (0 mLs Intravenous Stopped 7/3/22 1330)   ciprofloxacin HCl tablet 500 mg (500 mg Oral Given 7/3/22 1541)     Medical Decision Making:   History:   I obtained history from: someone other than patient.  Old Medical Records: I decided to obtain old medical records.  Old Records Summarized: records from clinic visits and records from previous admission(s).  Initial Assessment:   Patient with dementia brought in by family for episode of blood in underwear. +vaginal atrophy, no active vaginal bleeding, neg guaiac. VSS, afebrile  Differential Diagnosis:   UTI, vaginal bleeding 2/2 atrophy, occult GIB  Physical exam and history taking lower clinical suspicion for unstable GIB, unstable vaginal bleeding, hemorrhagic shock, acute abdomen, sepsis  Clinical Tests:   Lab Tests: Ordered and Reviewed             ED Course as of 07/03/22 1754   Sun Jul 03, 2022   1202 CBC auto differential(!)  Largely unremarkable [MF]   1214 Albumin(!): 3.2  Otherwise unremarkable [MF]   1446 WBC Clumps, UA(!): Occasional [MF]   1446 WBC, UA(!): >100 [MF]   1446 RBC, UA(!): >100 [MF]   1446 NITRITE UA(!): Positive [MF]   1446 Leukocytes, UA(!): 3+  Not clean catch, though nitrite pos with clumps. Will Tx, likely source of appearance of blood in underwear. No s/s of pyelo [MF]   1508 Hx pseudomonas UTI, Rx cipro [MF]      ED Course User Index  [MF] Marta Meyers PA-C           Patient continues to be well appearing. No further notable  bleeding- suspect source from acute UTI. No s/s of urosepsis. Discharged in stable condition with strict ED return  precautions. Family agreed to plan of care and voiced understanding.    Marta Meyers PA-C  07/03/2022        Clinical Impression:   Final diagnoses:  [N30.01] Acute cystitis with hematuria (Primary)          ED Disposition Condition    Discharge Stable        ED Prescriptions     Medication Sig Dispense Start Date End Date Auth. Provider    ciprofloxacin HCl (CIPRO) 500 MG tablet Take 1 tablet (500 mg total) by mouth 2 (two) times daily. for 7 days 14 tablet 7/3/2022 7/10/2022 Marta Meyers PA-C        Follow-up Information    None          Marta Meyers PA-C  07/03/22 5409

## 2022-07-03 NOTE — ED TRIAGE NOTES
The pt is a 89-year-old female who presents to the ED from home via personal transportation. The pts family states that she has blood in her urine for the last few days and heavy vaginal bleeding this morning. The family reports that pt refuses to drink water but will drink tea and coffee. Pt denies abdominal pain or difficulty with urination, no burning, no pain, normal amount.

## 2022-07-03 NOTE — ED NOTES
Patient identifiers for Franny Arnold 89 y.o. female checked and correct.  Chief Complaint   Patient presents with    Hematuria     Pt's daughter saw blood in pad this morning, blood in urine, also not urinating much.    Dehydration     Past Medical History:   Diagnosis Date    Basal cell carcinoma     Dementia      Allergies reported: Review of patient's allergies indicates:  No Known Allergies      LOC: Patient is awake, alert, and aware of environment with an appropriate affect. Patient is oriented x 3 and speaking appropriately.  APPEARANCE: Patient resting comfortably and in no acute distress. Patient is clean and well groomed, patient's clothing is properly fastened.  HEENT: Pt presents with surgical mask on.   SKIN: The skin is warm and dry. Patient has sluggish skin turgor and dry mucus membranes.   MUSKULOSKELETAL: Patient is moving all extremities well, no obvious deformities noted. Pulses intact. Pt reports the use of a walker at home and need some assistance.   RESPIRATORY: Airway is open and patent. Respirations are spontaneous and non-labored with normal effort and rate.  CARDIAC: Patient has a normal rate and rhythm. 59 on cardiac monitor. No peripheral edema noted.   ABDOMEN: No distention noted. Soft and non-tender upon palpation.  NEUROLOGICAL: pupils 4 mm, PERRL. Facial expression is symmetrical. Hand grasps are equal bilaterally. Normal sensation in all extremities when touched with finger. Hx of dementia.

## 2022-07-06 LAB — BACTERIA UR CULT: ABNORMAL

## 2022-12-29 ENCOUNTER — HOSPITAL ENCOUNTER (INPATIENT)
Facility: HOSPITAL | Age: 87
LOS: 6 days | Discharge: NURSING, REHAB OR LONG-TERM CARE | DRG: 065 | End: 2023-01-04
Attending: STUDENT IN AN ORGANIZED HEALTH CARE EDUCATION/TRAINING PROGRAM | Admitting: PSYCHIATRY & NEUROLOGY
Payer: MEDICARE

## 2022-12-29 DIAGNOSIS — I63.9 STROKE: ICD-10-CM

## 2022-12-29 DIAGNOSIS — I63.311 THROMBOTIC STROKE INVOLVING RIGHT MIDDLE CEREBRAL ARTERY: Primary | ICD-10-CM

## 2022-12-29 PROBLEM — E78.2 MIXED HYPERLIPIDEMIA: Status: ACTIVE | Noted: 2022-12-29

## 2022-12-29 PROBLEM — R53.1 WEAKNESS: Status: ACTIVE | Noted: 2022-12-29

## 2022-12-29 PROBLEM — N13.39 OTHER HYDRONEPHROSIS: Status: ACTIVE | Noted: 2022-12-29

## 2022-12-29 PROBLEM — I71.21 ANEURYSM OF ASCENDING AORTA WITHOUT RUPTURE: Status: ACTIVE | Noted: 2022-12-29

## 2022-12-29 PROBLEM — R93.89 ABNORMAL CT SCAN, CHEST: Status: ACTIVE | Noted: 2022-12-29

## 2022-12-29 LAB
ALBUMIN SERPL BCP-MCNC: 3.5 G/DL (ref 3.5–5.2)
ALLENS TEST: ABNORMAL
ALLENS TEST: ABNORMAL
ALP SERPL-CCNC: 83 U/L (ref 55–135)
ALT SERPL W/O P-5'-P-CCNC: 8 U/L (ref 10–44)
ANION GAP SERPL CALC-SCNC: 7 MMOL/L (ref 8–16)
ANISOCYTOSIS BLD QL SMEAR: SLIGHT
APTT BLDCRRT: 21 SEC (ref 21–32)
AST SERPL-CCNC: 20 U/L (ref 10–40)
BACTERIA #/AREA URNS AUTO: ABNORMAL /HPF
BASOPHILS NFR BLD: 1 % (ref 0–1.9)
BILIRUB SERPL-MCNC: 1 MG/DL (ref 0.1–1)
BILIRUB UR QL STRIP: NEGATIVE
BNP SERPL-MCNC: 46 PG/ML (ref 0–99)
BNP SERPL-MCNC: 82 PG/ML (ref 0–99)
BUN SERPL-MCNC: 11 MG/DL (ref 10–30)
CALCIUM SERPL-MCNC: 8.9 MG/DL (ref 8.7–10.5)
CHLORIDE SERPL-SCNC: 103 MMOL/L (ref 95–110)
CHOLEST SERPL-MCNC: 224 MG/DL (ref 120–199)
CHOLEST/HDLC SERPL: 4.1 {RATIO} (ref 2–5)
CLARITY UR REFRACT.AUTO: CLEAR
CO2 SERPL-SCNC: 25 MMOL/L (ref 23–29)
COLOR UR AUTO: COLORLESS
CREAT SERPL-MCNC: 0.9 MG/DL (ref 0.5–1.4)
DELSYS: ABNORMAL
DIFFERENTIAL METHOD: ABNORMAL
EOSINOPHIL NFR BLD: 0 % (ref 0–8)
ERYTHROCYTE [DISTWIDTH] IN BLOOD BY AUTOMATED COUNT: 14.2 % (ref 11.5–14.5)
EST. GFR  (NO RACE VARIABLE): 57.8 ML/MIN/1.73 M^2
GLUCOSE SERPL-MCNC: 84 MG/DL (ref 70–110)
GLUCOSE UR QL STRIP: NEGATIVE
HCO3 UR-SCNC: 33.7 MMOL/L (ref 24–28)
HCO3 UR-SCNC: 34.8 MMOL/L (ref 24–28)
HCT VFR BLD AUTO: 50.5 % (ref 37–48.5)
HDLC SERPL-MCNC: 54 MG/DL (ref 40–75)
HDLC SERPL: 24.1 % (ref 20–50)
HGB BLD-MCNC: 15.3 G/DL (ref 12–16)
HGB UR QL STRIP: ABNORMAL
IMM GRANULOCYTES # BLD AUTO: ABNORMAL K/UL (ref 0–0.04)
IMM GRANULOCYTES NFR BLD AUTO: ABNORMAL % (ref 0–0.5)
INR PPP: 1.3 (ref 0.8–1.2)
KETONES UR QL STRIP: NEGATIVE
LDLC SERPL CALC-MCNC: 131.6 MG/DL (ref 63–159)
LEUKOCYTE ESTERASE UR QL STRIP: NEGATIVE
LYMPHOCYTES NFR BLD: 20 % (ref 18–48)
MCH RBC QN AUTO: 26.9 PG (ref 27–31)
MCHC RBC AUTO-ENTMCNC: 30.3 G/DL (ref 32–36)
MCV RBC AUTO: 89 FL (ref 82–98)
MICROSCOPIC COMMENT: ABNORMAL
MODE: ABNORMAL
MONOCYTES NFR BLD: 3 % (ref 4–15)
NEUTROPHILS NFR BLD: 76 % (ref 38–73)
NITRITE UR QL STRIP: NEGATIVE
NONHDLC SERPL-MCNC: 170 MG/DL
NRBC BLD-RTO: 0 /100 WBC
PCO2 BLDA: 54.3 MMHG (ref 35–45)
PCO2 BLDA: 75.1 MMHG (ref 35–45)
PH SMN: 7.27 [PH] (ref 7.35–7.45)
PH SMN: 7.4 [PH] (ref 7.35–7.45)
PH UR STRIP: 7 [PH] (ref 5–8)
PLATELET # BLD AUTO: 159 K/UL (ref 150–450)
PLATELET BLD QL SMEAR: ABNORMAL
PMV BLD AUTO: 10.7 FL (ref 9.2–12.9)
PO2 BLDA: 21 MMHG (ref 40–60)
PO2 BLDA: 43 MMHG (ref 40–60)
POC BE: 8 MMOL/L
POC BE: 9 MMOL/L
POC SATURATED O2: 33 % (ref 95–100)
POC SATURATED O2: 69 % (ref 95–100)
POC TCO2: 35 MMOL/L (ref 24–29)
POC TCO2: 37 MMOL/L (ref 24–29)
POCT GLUCOSE: 105 MG/DL (ref 70–110)
POTASSIUM SERPL-SCNC: 4.5 MMOL/L (ref 3.5–5.1)
PROT SERPL-MCNC: 6.3 G/DL (ref 6–8.4)
PROT UR QL STRIP: NEGATIVE
PROTHROMBIN TIME: 13 SEC (ref 9–12.5)
RBC # BLD AUTO: 5.69 M/UL (ref 4–5.4)
RBC #/AREA URNS AUTO: 2 /HPF (ref 0–4)
SAMPLE: ABNORMAL
SAMPLE: ABNORMAL
SITE: ABNORMAL
SITE: ABNORMAL
SMUDGE CELLS BLD QL SMEAR: PRESENT
SODIUM SERPL-SCNC: 135 MMOL/L (ref 136–145)
SP GR UR STRIP: >1.03 (ref 1–1.03)
SQUAMOUS #/AREA URNS AUTO: 5 /HPF
TRIGL SERPL-MCNC: 192 MG/DL (ref 30–150)
TROPONIN I SERPL DL<=0.01 NG/ML-MCNC: 0.01 NG/ML (ref 0–0.03)
TROPONIN I SERPL DL<=0.01 NG/ML-MCNC: 0.01 NG/ML (ref 0–0.03)
TSH SERPL DL<=0.005 MIU/L-ACNC: 2.99 UIU/ML (ref 0.4–4)
TSH SERPL DL<=0.005 MIU/L-ACNC: 3.48 UIU/ML (ref 0.4–4)
URN SPEC COLLECT METH UR: ABNORMAL
WBC # BLD AUTO: 8.79 K/UL (ref 3.9–12.7)
WBC #/AREA URNS AUTO: 12 /HPF (ref 0–5)

## 2022-12-29 PROCEDURE — 85007 BL SMEAR W/DIFF WBC COUNT: CPT | Performed by: STUDENT IN AN ORGANIZED HEALTH CARE EDUCATION/TRAINING PROGRAM

## 2022-12-29 PROCEDURE — 80061 LIPID PANEL: CPT | Performed by: STUDENT IN AN ORGANIZED HEALTH CARE EDUCATION/TRAINING PROGRAM

## 2022-12-29 PROCEDURE — 12000002 HC ACUTE/MED SURGE SEMI-PRIVATE ROOM

## 2022-12-29 PROCEDURE — 25500020 PHARM REV CODE 255: Performed by: STUDENT IN AN ORGANIZED HEALTH CARE EDUCATION/TRAINING PROGRAM

## 2022-12-29 PROCEDURE — 82962 GLUCOSE BLOOD TEST: CPT

## 2022-12-29 PROCEDURE — 93005 ELECTROCARDIOGRAM TRACING: CPT

## 2022-12-29 PROCEDURE — 85610 PROTHROMBIN TIME: CPT | Performed by: STUDENT IN AN ORGANIZED HEALTH CARE EDUCATION/TRAINING PROGRAM

## 2022-12-29 PROCEDURE — 94761 N-INVAS EAR/PLS OXIMETRY MLT: CPT

## 2022-12-29 PROCEDURE — 80053 COMPREHEN METABOLIC PANEL: CPT | Performed by: STUDENT IN AN ORGANIZED HEALTH CARE EDUCATION/TRAINING PROGRAM

## 2022-12-29 PROCEDURE — 99291 PR CRITICAL CARE, E/M 30-74 MINUTES: ICD-10-PCS | Mod: ,,, | Performed by: STUDENT IN AN ORGANIZED HEALTH CARE EDUCATION/TRAINING PROGRAM

## 2022-12-29 PROCEDURE — 63600175 PHARM REV CODE 636 W HCPCS

## 2022-12-29 PROCEDURE — 96374 THER/PROPH/DIAG INJ IV PUSH: CPT

## 2022-12-29 PROCEDURE — 99291 CRITICAL CARE FIRST HOUR: CPT | Mod: 25

## 2022-12-29 PROCEDURE — 84484 ASSAY OF TROPONIN QUANT: CPT | Mod: 91 | Performed by: STUDENT IN AN ORGANIZED HEALTH CARE EDUCATION/TRAINING PROGRAM

## 2022-12-29 PROCEDURE — 99223 1ST HOSP IP/OBS HIGH 75: CPT | Mod: FS,,, | Performed by: NURSE PRACTITIONER

## 2022-12-29 PROCEDURE — 93010 ELECTROCARDIOGRAM REPORT: CPT | Mod: ,,, | Performed by: INTERNAL MEDICINE

## 2022-12-29 PROCEDURE — 99223 PR INITIAL HOSPITAL CARE,LEVL III: ICD-10-PCS | Mod: FS,,, | Performed by: NURSE PRACTITIONER

## 2022-12-29 PROCEDURE — 99291 CRITICAL CARE FIRST HOUR: CPT | Mod: ,,, | Performed by: STUDENT IN AN ORGANIZED HEALTH CARE EDUCATION/TRAINING PROGRAM

## 2022-12-29 PROCEDURE — 85730 THROMBOPLASTIN TIME PARTIAL: CPT | Performed by: STUDENT IN AN ORGANIZED HEALTH CARE EDUCATION/TRAINING PROGRAM

## 2022-12-29 PROCEDURE — 85027 COMPLETE CBC AUTOMATED: CPT | Performed by: STUDENT IN AN ORGANIZED HEALTH CARE EDUCATION/TRAINING PROGRAM

## 2022-12-29 PROCEDURE — 84443 ASSAY THYROID STIM HORMONE: CPT | Mod: 91 | Performed by: STUDENT IN AN ORGANIZED HEALTH CARE EDUCATION/TRAINING PROGRAM

## 2022-12-29 PROCEDURE — 99900035 HC TECH TIME PER 15 MIN (STAT)

## 2022-12-29 PROCEDURE — 81001 URINALYSIS AUTO W/SCOPE: CPT | Performed by: STUDENT IN AN ORGANIZED HEALTH CARE EDUCATION/TRAINING PROGRAM

## 2022-12-29 PROCEDURE — 82803 BLOOD GASES ANY COMBINATION: CPT

## 2022-12-29 PROCEDURE — 83880 ASSAY OF NATRIURETIC PEPTIDE: CPT | Mod: 91 | Performed by: STUDENT IN AN ORGANIZED HEALTH CARE EDUCATION/TRAINING PROGRAM

## 2022-12-29 PROCEDURE — 93010 EKG 12-LEAD: ICD-10-PCS | Mod: ,,, | Performed by: INTERNAL MEDICINE

## 2022-12-29 PROCEDURE — 83036 HEMOGLOBIN GLYCOSYLATED A1C: CPT | Performed by: STUDENT IN AN ORGANIZED HEALTH CARE EDUCATION/TRAINING PROGRAM

## 2022-12-29 PROCEDURE — 87086 URINE CULTURE/COLONY COUNT: CPT | Performed by: STUDENT IN AN ORGANIZED HEALTH CARE EDUCATION/TRAINING PROGRAM

## 2022-12-29 RX ORDER — LORAZEPAM 2 MG/ML
0.5 INJECTION INTRAMUSCULAR
Status: COMPLETED | OUTPATIENT
Start: 2022-12-29 | End: 2022-12-29

## 2022-12-29 RX ORDER — LORAZEPAM 2 MG/ML
INJECTION INTRAMUSCULAR
Status: COMPLETED
Start: 2022-12-29 | End: 2022-12-29

## 2022-12-29 RX ORDER — SODIUM CHLORIDE 0.9 % (FLUSH) 0.9 %
10 SYRINGE (ML) INJECTION
Status: DISCONTINUED | OUTPATIENT
Start: 2022-12-29 | End: 2023-01-04 | Stop reason: HOSPADM

## 2022-12-29 RX ORDER — ACETAMINOPHEN 325 MG/1
650 TABLET ORAL EVERY 6 HOURS PRN
Status: DISCONTINUED | OUTPATIENT
Start: 2022-12-29 | End: 2023-01-04 | Stop reason: HOSPADM

## 2022-12-29 RX ORDER — ASPIRIN 81 MG/1
81 TABLET ORAL DAILY
Status: DISCONTINUED | OUTPATIENT
Start: 2022-12-30 | End: 2022-12-30

## 2022-12-29 RX ORDER — ATORVASTATIN CALCIUM 20 MG/1
40 TABLET, FILM COATED ORAL DAILY
Status: DISCONTINUED | OUTPATIENT
Start: 2022-12-30 | End: 2023-01-04 | Stop reason: HOSPADM

## 2022-12-29 RX ORDER — LABETALOL HCL 20 MG/4 ML
10 SYRINGE (ML) INTRAVENOUS EVERY 6 HOURS PRN
Status: DISCONTINUED | OUTPATIENT
Start: 2022-12-29 | End: 2023-01-04 | Stop reason: HOSPADM

## 2022-12-29 RX ORDER — HEPARIN SODIUM 5000 [USP'U]/ML
5000 INJECTION, SOLUTION INTRAVENOUS; SUBCUTANEOUS EVERY 8 HOURS
Status: DISCONTINUED | OUTPATIENT
Start: 2022-12-29 | End: 2023-01-04 | Stop reason: HOSPADM

## 2022-12-29 RX ADMIN — LORAZEPAM 0.5 MG: 2 INJECTION INTRAMUSCULAR at 09:12

## 2022-12-29 RX ADMIN — IOHEXOL 100 ML: 350 INJECTION, SOLUTION INTRAVENOUS at 04:12

## 2022-12-29 RX ADMIN — IOHEXOL 50 ML: 350 INJECTION, SOLUTION INTRAVENOUS at 06:12

## 2022-12-29 RX ADMIN — LORAZEPAM 0.5 MG: 2 INJECTION INTRAMUSCULAR; INTRAVENOUS at 09:12

## 2022-12-29 NOTE — ASSESSMENT & PLAN NOTE
89 year old female with past medical history of dementia, HTN, and DM who presented to ED with c/o slurred speech and weakness. Caregiver reports last known normal was last night at 9:30pm when she went to sleep. Then she woke up this morning around 9am and they noticed that she had difficulty using her walker and slurred speech. They called the PCP and were told to come to the ED. Caregiver reports her speech has improved since this morning. On exam patient with no focal weakness, no vision deficit, no sensation deficit, delayed speech, and no facial droop. CTH showed no acute intracranial pathology. CTA showed no LVO. Patient not a candidate for tenecteplase due to out of window. Not a candidate for thrombectomy no LVO  seen on imaging.     Recommend MRI to rule out ischemia. If MRI is negative recommend alternative workup.     Vascular Neurology will follow up with imaging.

## 2022-12-29 NOTE — ED TRIAGE NOTES
Franny Arnold, a 89 y.o. female presents to the ED via personal transportation with CC difficulty walking and talking today noticed by family upon waking up approx 930am today, LSN last night 930 pm. Pt with hx of dementia but more altered upon waking up this AM.

## 2022-12-29 NOTE — CONSULTS
[facility] - Emergency Dept  Vascular Neurology  [facility]  Consult Note    Inpatient consult to Vascular (Stroke) Neurology  Consult performed by: Kenia Bello NP  Consult ordered by: Hilda Francis MD        Assessment/Plan:     Patient is a 89 y.o. year old female with:    * Weakness  89 year old female with past medical history of dementia, HTN, and DM who presented to ED with c/o slurred speech and weakness. Caregiver reports last known normal was last night at 9:30pm when she went to sleep. Then she woke up this morning around 9am and they noticed that she had difficulty using her walker and slurred speech. They called the PCP and were told to come to the ED. Caregiver reports her speech has improved since this morning. On exam patient with no focal weakness, no vision deficit, no sensation deficit, delayed speech, and no facial droop. CTH showed no acute intracranial pathology. CTA showed no LVO. Patient not a candidate for tenecteplase due to out of window. Not a candidate for thrombectomy no LVO  seen on imaging.     Recommend MRI to rule out ischemia. If MRI is negative recommend alternative workup.     Vascular Neurology will follow up with imaging.         STROKE DOCUMENTATION     Acute Stroke Times   Last Known Normal Date: 12/29/22  Last Known Normal Time: 2130  Symptom Onset Date: 12/29/22  Unknown Symptom Onset Date: Unknown Date  Symptom Onset Time: 1000  Unknown Symptom Onset Time: Unknown Time  Stroke Team Called Date: 12/29/22  Stroke Team Called Time: 1640  Stroke Team Arrival Date: 12/29/22  Stroke Team Arrival Time: 1645  CT Interpretation Time: 1646  Thrombolytic Therapy Recommended: No  CTA Interpretation Time: 1650  Thrombectomy Recommended: No    NIH Scale:  1a. Level of Consciousness: 0-->Alert, keenly responsive  1b. LOC Questions: 0-->Answers both questions correctly  1c. LOC Commands: 0-->Performs both tasks correctly  2. Best Gaze: 0-->Normal  3. Visual: 0-->No  visual loss  4. Facial Palsy: 0-->Normal symmetrical movements  5a. Motor Arm, Left: 0-->No drift, limb holds 90 (or 45) degrees for full 10 secs  5b. Motor Arm, Right: 0-->No drift, limb holds 90 (or 45) degrees for full 10 secs  6a. Motor Leg, Left: 1-->Drift, leg falls by the end of the 5-sec period but does not hit bed  6b. Motor Leg, Right: 1-->Drift, leg falls by the end of the 5-sec period but does not hit bed  7. Limb Ataxia: 0-->Absent  8. Sensory: 0-->Normal, no sensory loss  9. Best Language: 0-->No aphasia, normal  10. Dysarthria: 1-->Mild-to-moderate dysarthria, patient slurs at least some words and, at worst, can be understood with some difficulty  11. Extinction and Inattention (formerly Neglect): 0-->No abnormality  Total (NIH Stroke Scale): 3    Modified Madeline Score: 3  [place] Coma Scale:    ABCD2 Score:    [id number] Score:   HAS -BLED Score:   ICH Score:   Hunt & Katz Classification:       Thrombolysis Candidate? No, Out of window - Symptom onset > 4.5 hours    Delays to Thrombolysis?  No    Interventional Revascularization Candidate?   Is the patient eligible for mechanical endovascular reperfusion (EUGENIO)?  No; No large vessel occlusion identified on imaging     Delays to Thrombectomy? No    Hemorrhagic change of an Ischemic Stroke: Does this patient have an ischemic stroke with hemorrhagic changes? No     Subjective:     History of Present Illness:  89 year old female with past medical history of dementia, HTN, and DM who presented to ED with c/o slurred speech and weakness. Caregiver reports last known normal was last night at 9:30pm when she went to sleep. Then she woke up this morning around 9am and they noticed that she had difficulty using her walker and slurred speech. They called the PCP and were told to come to the ED. Caregiver reports her speech has improved since this morning. On exam patient with no focal weakness, no vision deficit, no sensation deficit, delayed speech, and no  facial droop. CTH showed no acute intracranial pathology. CTA showed no LVO. Patient not a candidate for tenecteplase due to out of window. Not a candidate for thrombectomy no LVO  seen on imaging.     Recommend MRI to rule out ischemia. If MRI is negative recommend alternative workup.     Vascular Neurology will follow up with imaging.           Past Medical History:   Diagnosis Date    Basal cell carcinoma     Dementia      Past Surgical History:   Procedure Laterality Date    APPENDECTOMY      INSERTION OF INTRAMEDULLARY ZAYNAB Left 1/2/2020    Procedure: INSERTION, INTRAMEDULLARY ZAYNAB;  Surgeon: Adithya Addison MD;  Location: [facility] OR [address] [place] [address];  Service: Orthopedics;  Laterality: Left;    TONSILLECTOMY       History reviewed. No pertinent family history.  Social History     Tobacco Use    Smoking status: Never    Smokeless tobacco: Never   Substance Use Topics    Alcohol use: Not Currently    Drug use: Never     Review of patient's allergies indicates:  No Known Allergies    Medications: I have reviewed the current medication administration record.    (Not in a hospital admission)      Review of Systems   Constitutional:  Negative for activity change and fatigue.   HENT:  Negative for drooling and trouble swallowing.    Eyes:  Negative for photophobia and visual disturbance.   Respiratory:  Negative for cough and shortness of breath.    Cardiovascular:  Negative for chest pain and palpitations.   Gastrointestinal:  Negative for nausea and vomiting.   Musculoskeletal:  Negative for neck pain and neck stiffness.   Skin:  Negative for rash and wound.   Neurological:  Positive for speech difficulty and weakness. Negative for facial asymmetry and headaches.   Psychiatric/Behavioral:  Negative for confusion. The patient is not nervous/anxious.    Objective:     Vital Signs (Most Recent):  Temp: 97.7 °F (36.5 °C) (12/29/22 1606)  Pulse: 75 (12/29/22 1719)  Resp: 16 (12/29/22 1655)  BP: (!) 178/86 (12/29/22  1719)  SpO2: 99 % (12/29/22 1719)    Vital Signs Range (Last 24H):  Temp:  [97.7 °F (36.5 °C)]   Pulse:  [68-77]   Resp:  [16]   BP: (162-180)/()   SpO2:  [96 %-99 %]     Physical Exam  Constitutional:       General: She is not in acute distress.  HENT:      Head: Normocephalic.   Eyes:      Extraocular Movements: Extraocular movements intact.   Cardiovascular:      Rate and Rhythm: Normal rate.   Pulmonary:      Effort: Pulmonary effort is normal.   Abdominal:      General: Abdomen is flat.   Musculoskeletal:         General: Normal range of motion.   Skin:     General: Skin is warm and dry.   Neurological:      Mental Status: She is alert and oriented to person, place, and time.      Cranial Nerves: No dysarthria or facial asymmetry.      Motor: Weakness present.      Coordination: Finger-Nose-Finger Test normal.   Psychiatric:         Mood and Affect: Mood normal.         Speech: Speech is delayed.       Neurological Exam:   LOC: alert  Attention Span: Good   Language: No aphasia  Articulation: delayed speech  Orientation: Person, Place, Time   Visual Fields: Full  EOM (CN III, IV, VI): Full/intact  Facial Movement (CN VII): Symmetric facial expression    Motor: Arm left  Normal 5/5  Leg left  Paresis: 4/5  Arm right  Normal 5/5  Leg right Paresis: 4/5  Cerebellum: No evidence of appendicular or axial ataxia  Sensation: Intact to light touch, temperature and vibration      Laboratory:  CMP: No results for input(s): GLUCOSE, CALCIUM, ALBUMIN, PROT, NA, K, CO2, CL, BUN, CREATININE, ALKPHOS, ALT, AST, BILITOT in the last 24 hours.  CBC: No results for input(s): WBC, RBC, HGB, HCT, PLT, MCV, MCH, MCHC in the last 168 hours.  Lipid Panel: No results for input(s): CHOL, LDLCALC, HDL, TRIG in the last 168 hours.  Coagulation:   Recent Labs   Lab 12/29/22  1640   INR 1.3*   APTT 21.0     Hgb A1C: No results for input(s): HGBA1C in the last 168 hours.  TSH: No results for input(s): TSH in the last 168  hours.    Diagnostic Results:      Brain imaging:  CTA Stroke MP- no LVO seen pending final radiology read.        Kenia Bello NP  [facility]  Department of Vascular Neurology   [facility] - Emergency Dept

## 2022-12-30 LAB
ALBUMIN SERPL BCP-MCNC: 3.6 G/DL (ref 3.5–5.2)
ALP SERPL-CCNC: 87 U/L (ref 55–135)
ALT SERPL W/O P-5'-P-CCNC: 6 U/L (ref 10–44)
ANION GAP SERPL CALC-SCNC: 9 MMOL/L (ref 8–16)
APTT BLDCRRT: 31.7 SEC (ref 21–32)
ASCENDING AORTA: 2.98 CM
AST SERPL-CCNC: 15 U/L (ref 10–40)
AV INDEX (PROSTH): 0.57
AV MEAN GRADIENT: 12 MMHG
AV PEAK GRADIENT: 20 MMHG
AV VALVE AREA: 1.8 CM2
AV VELOCITY RATIO: 0.5
BASOPHILS # BLD AUTO: 0.03 K/UL (ref 0–0.2)
BASOPHILS NFR BLD: 0.3 % (ref 0–1.9)
BILIRUB SERPL-MCNC: 1.2 MG/DL (ref 0.1–1)
BSA FOR ECHO PROCEDURE: 1.49 M2
BUN SERPL-MCNC: 9 MG/DL (ref 10–30)
CALCIUM SERPL-MCNC: 9.4 MG/DL (ref 8.7–10.5)
CHLORIDE SERPL-SCNC: 104 MMOL/L (ref 95–110)
CK MB SERPL-MCNC: 2.6 NG/ML (ref 0.1–6.5)
CK MB SERPL-RTO: 2.3 % (ref 0–5)
CK SERPL-CCNC: 114 U/L (ref 20–180)
CO2 SERPL-SCNC: 25 MMOL/L (ref 23–29)
CREAT SERPL-MCNC: 1 MG/DL (ref 0.5–1.4)
CV ECHO LV RWT: 0.49 CM
DIFFERENTIAL METHOD: ABNORMAL
DOP CALC AO PEAK VEL: 2.24 M/S
DOP CALC AO VTI: 44.15 CM
DOP CALC LVOT AREA: 3.2 CM2
DOP CALC LVOT DIAMETER: 2.01 CM
DOP CALC LVOT PEAK VEL: 1.12 M/S
DOP CALC LVOT STROKE VOLUME: 79.45 CM3
DOP CALCLVOT PEAK VEL VTI: 25.05 CM
ECHO LV POSTERIOR WALL: 0.91 CM (ref 0.6–1.1)
EJECTION FRACTION: 65 %
EOSINOPHIL # BLD AUTO: 0.1 K/UL (ref 0–0.5)
EOSINOPHIL NFR BLD: 0.7 % (ref 0–8)
ERYTHROCYTE [DISTWIDTH] IN BLOOD BY AUTOMATED COUNT: 14.3 % (ref 11.5–14.5)
EST. GFR  (NO RACE VARIABLE): 50.9 ML/MIN/1.73 M^2
ESTIMATED AVG GLUCOSE: 100 MG/DL (ref 68–131)
FRACTIONAL SHORTENING: 43 % (ref 28–44)
GLUCOSE SERPL-MCNC: 82 MG/DL (ref 70–110)
HBA1C MFR BLD: 5.1 % (ref 4–5.6)
HCT VFR BLD AUTO: 53 % (ref 37–48.5)
HGB BLD-MCNC: 16.5 G/DL (ref 12–16)
IMM GRANULOCYTES # BLD AUTO: 0.07 K/UL (ref 0–0.04)
IMM GRANULOCYTES NFR BLD AUTO: 0.7 % (ref 0–0.5)
INR PPP: 1 (ref 0.8–1.2)
INTERVENTRICULAR SEPTUM: 0.83 CM (ref 0.6–1.1)
LA MAJOR: 3.96 CM
LA MINOR: 3.93 CM
LA WIDTH: 3.28 CM
LEFT ATRIUM SIZE: 3.17 CM
LEFT ATRIUM VOLUME INDEX: 23.1 ML/M2
LEFT ATRIUM VOLUME: 34.87 CM3
LEFT INTERNAL DIMENSION IN SYSTOLE: 2.12 CM (ref 2.1–4)
LEFT VENTRICLE DIASTOLIC VOLUME INDEX: 20.64 ML/M2
LEFT VENTRICLE DIASTOLIC VOLUME: 31.16 ML
LEFT VENTRICLE MASS INDEX: 61 G/M2
LEFT VENTRICLE SYSTOLIC VOLUME INDEX: 9.8 ML/M2
LEFT VENTRICLE SYSTOLIC VOLUME: 14.78 ML
LEFT VENTRICULAR INTERNAL DIMENSION IN DIASTOLE: 3.7 CM (ref 3.5–6)
LEFT VENTRICULAR MASS: 92.4 G
LYMPHOCYTES # BLD AUTO: 2.6 K/UL (ref 1–4.8)
LYMPHOCYTES NFR BLD: 25.5 % (ref 18–48)
MAGNESIUM SERPL-MCNC: 2.1 MG/DL (ref 1.6–2.6)
MCH RBC QN AUTO: 28 PG (ref 27–31)
MCHC RBC AUTO-ENTMCNC: 31.1 G/DL (ref 32–36)
MCV RBC AUTO: 90 FL (ref 82–98)
MONOCYTES # BLD AUTO: 0.7 K/UL (ref 0.3–1)
MONOCYTES NFR BLD: 7.3 % (ref 4–15)
NEUTROPHILS # BLD AUTO: 6.7 K/UL (ref 1.8–7.7)
NEUTROPHILS NFR BLD: 65.5 % (ref 38–73)
NRBC BLD-RTO: 0 /100 WBC
PHOSPHATE SERPL-MCNC: 3.2 MG/DL (ref 2.7–4.5)
PLATELET # BLD AUTO: 155 K/UL (ref 150–450)
PMV BLD AUTO: 11.1 FL (ref 9.2–12.9)
POTASSIUM SERPL-SCNC: 3.9 MMOL/L (ref 3.5–5.1)
PROT SERPL-MCNC: 6.6 G/DL (ref 6–8.4)
PROTHROMBIN TIME: 10.6 SEC (ref 9–12.5)
RA MAJOR: 3.46 CM
RA PRESSURE: 3 MMHG
RA WIDTH: 2.56 CM
RBC # BLD AUTO: 5.89 M/UL (ref 4–5.4)
RIGHT VENTRICULAR END-DIASTOLIC DIMENSION: 2.54 CM
SINUS: 2.84 CM
SODIUM SERPL-SCNC: 138 MMOL/L (ref 136–145)
STJ: 3.06 CM
TDI LATERAL: 0.05 M/S
TDI SEPTAL: 0.06 M/S
TDI: 0.06 M/S
TROPONIN I SERPL DL<=0.01 NG/ML-MCNC: 0.01 NG/ML (ref 0–0.03)
WBC # BLD AUTO: 10.16 K/UL (ref 3.9–12.7)

## 2022-12-30 PROCEDURE — 97161 PT EVAL LOW COMPLEX 20 MIN: CPT

## 2022-12-30 PROCEDURE — 80053 COMPREHEN METABOLIC PANEL: CPT | Performed by: NURSE PRACTITIONER

## 2022-12-30 PROCEDURE — 97165 OT EVAL LOW COMPLEX 30 MIN: CPT

## 2022-12-30 PROCEDURE — 97535 SELF CARE MNGMENT TRAINING: CPT

## 2022-12-30 PROCEDURE — 82553 CREATINE MB FRACTION: CPT | Performed by: NURSE PRACTITIONER

## 2022-12-30 PROCEDURE — 11000001 HC ACUTE MED/SURG PRIVATE ROOM

## 2022-12-30 PROCEDURE — 99222 PR INITIAL HOSPITAL CARE,LEVL II: ICD-10-PCS | Mod: ,,, | Performed by: UROLOGY

## 2022-12-30 PROCEDURE — 94761 N-INVAS EAR/PLS OXIMETRY MLT: CPT

## 2022-12-30 PROCEDURE — 36415 COLL VENOUS BLD VENIPUNCTURE: CPT | Performed by: NURSE PRACTITIONER

## 2022-12-30 PROCEDURE — 97110 THERAPEUTIC EXERCISES: CPT

## 2022-12-30 PROCEDURE — 84100 ASSAY OF PHOSPHORUS: CPT | Performed by: NURSE PRACTITIONER

## 2022-12-30 PROCEDURE — 85730 THROMBOPLASTIN TIME PARTIAL: CPT | Performed by: NURSE PRACTITIONER

## 2022-12-30 PROCEDURE — 85025 COMPLETE CBC W/AUTO DIFF WBC: CPT | Performed by: NURSE PRACTITIONER

## 2022-12-30 PROCEDURE — 99222 1ST HOSP IP/OBS MODERATE 55: CPT | Mod: ,,, | Performed by: UROLOGY

## 2022-12-30 PROCEDURE — 99233 PR SUBSEQUENT HOSPITAL CARE,LEVL III: ICD-10-PCS | Mod: ,,, | Performed by: PSYCHIATRY & NEUROLOGY

## 2022-12-30 PROCEDURE — 99233 SBSQ HOSP IP/OBS HIGH 50: CPT | Mod: ,,, | Performed by: PSYCHIATRY & NEUROLOGY

## 2022-12-30 PROCEDURE — 84484 ASSAY OF TROPONIN QUANT: CPT | Performed by: NURSE PRACTITIONER

## 2022-12-30 PROCEDURE — 83735 ASSAY OF MAGNESIUM: CPT | Performed by: NURSE PRACTITIONER

## 2022-12-30 PROCEDURE — 92610 EVALUATE SWALLOWING FUNCTION: CPT

## 2022-12-30 PROCEDURE — 25000003 PHARM REV CODE 250: Performed by: NURSE PRACTITIONER

## 2022-12-30 PROCEDURE — 85610 PROTHROMBIN TIME: CPT | Performed by: NURSE PRACTITIONER

## 2022-12-30 PROCEDURE — 97112 NEUROMUSCULAR REEDUCATION: CPT

## 2022-12-30 PROCEDURE — 63600175 PHARM REV CODE 636 W HCPCS: Performed by: NURSE PRACTITIONER

## 2022-12-30 RX ORDER — ASPIRIN 81 MG/1
81 TABLET ORAL DAILY
Status: DISCONTINUED | OUTPATIENT
Start: 2022-12-31 | End: 2022-12-31

## 2022-12-30 RX ORDER — ASPIRIN 81 MG/1
81 TABLET ORAL DAILY
Status: DISCONTINUED | OUTPATIENT
Start: 2022-12-30 | End: 2022-12-30

## 2022-12-30 RX ORDER — ASPIRIN 300 MG/1
150 SUPPOSITORY RECTAL DAILY
Status: DISCONTINUED | OUTPATIENT
Start: 2022-12-30 | End: 2022-12-30

## 2022-12-30 RX ADMIN — ASPIRIN 150 MG: 300 SUPPOSITORY RECTAL at 09:12

## 2022-12-30 RX ADMIN — HEPARIN SODIUM 5000 UNITS: 5000 INJECTION INTRAVENOUS; SUBCUTANEOUS at 02:12

## 2022-12-30 RX ADMIN — HEPARIN SODIUM 5000 UNITS: 5000 INJECTION INTRAVENOUS; SUBCUTANEOUS at 10:12

## 2022-12-30 RX ADMIN — HEPARIN SODIUM 5000 UNITS: 5000 INJECTION INTRAVENOUS; SUBCUTANEOUS at 05:12

## 2022-12-30 RX ADMIN — HEPARIN SODIUM 5000 UNITS: 5000 INJECTION INTRAVENOUS; SUBCUTANEOUS at 12:12

## 2022-12-30 NOTE — NURSING
Avasys placed in room but currently on wait list for observation.  Tech states they will c/b once able to be monitored.

## 2022-12-30 NOTE — PLAN OF CARE
PT Eval complete, appropriate goals created    Problem: Physical Therapy  Goal: Physical Therapy Goal  Description: Goals to be met by: 23     Patient will increase functional independence with mobility by performin. Supine to sit with Stand-by Assistance  2. Sit to supine with Stand-by Assistance  3. Sit to stand transfer with Minimal Assistance  4. Bed to chair transfer with Minimal Assistance using Rolling Walker  5. Gait  x 50 feet with Minimal Assistance using Rolling Walker.   6. Sitting at edge of bed x5 minutes with Supervision  7. Stand for 3 minutes with Stand-by Assistance using Rolling Walker  8. Lower extremity exercise program x8 reps per handout, with supervision    Outcome: Ongoing, Progressing

## 2022-12-30 NOTE — PT/OT/SLP EVAL
Patient sleeping supine with blanket over her head.  No distress observed.  
Continue to monitor. Physical Therapy Evaluation and Treatment    Patient Name: Franny Arnold   MRN: [id number]  Recent Surgery: * No surgery found *    Co-evaluation w/ OT 2/2 suspected pt complexity and requirement of assistance from 2 skilled therapists to maximize treatment potential and maintain pt safety,  Recommendations:     Discharge Recommendations: nursing facility, skilled   Discharge Equipment Recommendations: none   Barriers to discharge: Increased level of assist    Assessment:     Franny Arnold is a 89 y.o. female admitted with a medical diagnosis of Thrombotic stroke involving right middle cerebral artery. She presents with the following impairments/functional limitations: weakness, impaired sensation, impaired self care skills, impaired functional mobility, impaired endurance, gait instability, impaired balance, impaired cognition, decreased coordination, decreased upper extremity function, decreased lower extremity function, decreased safety awareness, impaired cardiopulmonary response to activity, impaired skin. Pt AO x1 and confused throughout session w/ fair command following and strength, but poor midline orientation in standing w/ posterior lean and BLE blocked w/ modA x2 required to maintain standing. Rec d/c to SNF for continued treatment as pt far from baseline mobility but currently unable to tolerate 3hrs of therapy/day.    Rehab Prognosis: Good; patient would benefit from acute skilled PT services to address these deficits and reach maximum level of function.  Recent Surgery: * No surgery found *      Plan:     During this hospitalization, patient to be seen 3 x/week to address the identified rehab impairments via gait training, therapeutic activities, therapeutic exercises, neuromuscular re-education and progress toward the following goals:    Plan of Care Expires: 01/28/23    Subjective     Chief Complaint: fatigue  Patient/Family Comments/Goals: pt's niece wants to see her mobility  improve  Pain/Comfort:  Pain Rating 1: 0/10  Pain Rating Post-Intervention 1: 0/10    Patients cultural, spiritual, Tenriism conflicts given the current situation: no    Social History:  Living Environment: Patient lives with their family in a single story home, number of outside stairs: 1, tub-shower combo.  Prior Level of Function: Prior to admission, patient required assistance with ADLs including CGA-[place] for transfers and ambulation w/ RW; niece lives w/ pt and is with her 24/7 .  Equipment Used at Home: bedside commode, bath bench, walker, rolling, wheelchair, grab bar, shower chair    DME owned (not currently used): transfer tub bench  Assistance Upon Discharge: family    Objective:     Communicated with RN prior to session. Patient found HOB elevated with telemetry, bed alarm, PureWick upon PT entry to room.    General Precautions: Standard, aspiration, fall, nectar thick   Orthopedic Precautions:    Braces: N/A  Respiratory Status: Room air    Exams:  Cognitive Exam: Patient is oriented to Person, Not oriented to person, Not oriented to place, Not oriented to time, Not oriented to situation, follows commands 75% of the time  RLE ROM: WFL  RLE Strength:  3-/5  LLE ROM: WFL  LLE Strength:  3-/5  Sensation: -       Impaired  light/touch BLE w/ dec sensation  Coordination: Impaired finger to nose     Functional Mobility:  Bed Mobility:  Rolling Left:  moderate assistance  Rolling Right: moderate assistance  Supine to Sit: moderate assistance of 2 persons  Sit to Supine: moderate assistance of 2 persons  Transfers:  Sit to Stand: moderate assistance of 2 persons with hand-held assist with BLE and posterior lean  Balance:   Static Sitting: stand by assistance at EOB  Dynamic Sitting: minimum assistance at EOB  Static Standing: moderate assistance of 2 persons with hand-held assist    Therapeutic Activities and Exercises:  Patient educated on role of acute care PT and PT POC, safety while in hospital including  calling nurse for mobility, and call light usage  Patient performed 1 set(s) of 6 repetitions of  the following seated exercises: ankle pumps, long arc quads, and marches for bilateral LE. Patient required skilled PT for instruction of exercises and appropriate cues to perform exercises safely and appropriately  Patient educated about importance of OOB mobility  Sitting balance x15min w/ [place] for dynamic stability and cueing for posterior lean when moving BUE/LE  Sts x3 w/ modA x2 w/ BLE blocked 2/2 feet sliding forward and max cueing for posture, posterior lean, and weight bearing  Pt and family educated on PT POC/goals, d/c recs, and continued treatment. All questions answered and pt family in agreement w/ POC.    Patient clear to stand pivot transfer with RN/PCT, assist x2 .    AM-PAC 6 CLICK MOBILITY  Turning over in bed (including adjusting bedclothes, sheets and blankets)?: 2  Sitting down on and standing up from a chair with arms (e.g., wheelchair, bedside commode, etc.): 2  Moving from lying on back to sitting on the side of the bed?: 2  Moving to and from a bed to a chair (including a wheelchair)?: 1  Need to walk in hospital room?: 1  Climbing 3-5 steps with a railing?: 1  Basic Mobility Total Score: 9     Patient left HOB elevated with all lines intact, call button in reach, RN notified, bed alarm on, and family present.    GOALS:   Multidisciplinary Problems       Physical Therapy Goals          Problem: Physical Therapy    Goal Priority Disciplines Outcome Goal Variances Interventions   Physical Therapy Goal     PT, PT/OT Ongoing, Progressing     Description: Goals to be met by: 23     Patient will increase functional independence with mobility by performin. Supine to sit with Stand-by Assistance  2. Sit to supine with Stand-by Assistance  3. Sit to stand transfer with Minimal Assistance  4. Bed to chair transfer with Minimal Assistance using Rolling Walker  5. Gait  x 50 feet with Minimal  Assistance using Rolling Walker.   6. Sitting at edge of bed x5 minutes with Supervision  7. Stand for 3 minutes with Stand-by Assistance using Rolling Walker  8. Lower extremity exercise program x8 reps per handout, with supervision                         History:     Past Medical History:   Diagnosis Date    Basal cell carcinoma     Dementia        Past Surgical History:   Procedure Laterality Date    APPENDECTOMY      INSERTION OF INTRAMEDULLARY ZAYNAB Left 1/2/2020    Procedure: INSERTION, INTRAMEDULLARY ZAYNAB;  Surgeon: Aidthya Addison MD;  Location: [facility] OR [address] [place] [address];  Service: Orthopedics;  Laterality: Left;    TONSILLECTOMY         Time Tracking:     PT Received On: 12/30/22  PT Start Time: 1424  PT Stop Time: 1501  PT Total Time (min): 37 min     Billable Minutes: Evaluation 7 and Therapeutic Exercise 30    12/30/2022

## 2022-12-30 NOTE — HPI
89 yo F with PMH of HTN, DM, Dementia, who presented to the ED due to slurred speech and weakness and is admitted for stroke. CTA stroke multiphase negative for acute process, shows multifocal moderate-severe ICAD. MRI brain with acute infarctions in the R MCA territory. Urology consulted for hydronephrosis. Patient is accompanied by her niece during the visit. She reports occasional non-specific back pain. She also reports a UTI this past Summer but she rarely gets UTI's. Patient denies fevers, chills, nausea, vomiting, flank pain, hematuria, dysuria, difficulty voiding.     CT RSS obtained today shows moderate to severe hydronephrosis on the left with abrupt transition point at the left UPJ. There is no hydroureteronephrosis, although the ureters are difficult to follow. On the right she has an extrarenal pelvis and no hydroureter. There are no visualized ureteral stones. Bladder filled with contrast. WBC 10. Hgb 16.5. Cr 1.0 (baseline 0.8-1.0). Clean catch UA nitrite negative, 2 RBC, 12 WBC, rare bacteria. Urine culture in process.

## 2022-12-30 NOTE — PROGRESS NOTES
Amol Colon - Neurosurgery ([facility])  Vascular Neurology  Comprehensive Stroke Center  Progress Note    Assessment/Plan:     * Thrombotic stroke involving right middle cerebral artery  89 year old female with past medical history of dementia, HTN, and DM who presented to ED with c/o slurred speech and weakness. CTA stroke multiphase negative for acute process, shows multifocal moderate-severe ICAD. MRI brain with acute infarctions in the R MCA territory. Suspect etiology at this time likely ICAD.    NAEO, neuro exam stable although patient more lethargic on exam this morning limiting exam. Repeat CTH ordered. Echo and therapy evals pending to complete stroke workup. Ideally recommend DAPT with ASA + Plavix for stroke prevention due to ICAD, but if determined high risk will continue with ASA monotherapy.      Antithrombotics for secondary stroke prevention: Antiplatelets: Aspirin: 81 mg daily    Statins for secondary stroke prevention and hyperlipidemia, if present:   Statins: Atorvastatin- 40 mg daily    Aggressive risk factor modification: HTN, HLD, AAD, ? neoplasm     Rehab efforts: The patient has been evaluated by a stroke team provider and the therapy needs have been fully considered based off the presenting complaints and exam findings. The following therapy evaluations are needed: PT evaluate and treat, OT evaluate and treat, SLP evaluate and treat    Diagnostics ordered/pending: TTE to assess cardiac function/status     VTE prophylaxis: Heparin 5000 units SQ every 8 hours  Mechanical prophylaxis: Place SCDs    BP parameters: Infarct: No intervention, SBP <220      Other hydronephrosis  -Noted on CTA chest obtained on 12/29/2022.  -Renal US with hydronephrosis (L >>> R) and non-obstructing nephrolithiasis of R kidney  -Urology consulted, recs appreciated    Mixed hyperlipidemia  Stroke risk factor  , goal <70  Atorvastatin 40mg daily      Abnormal CT scan, chest  -CTA Chest w/multiple bilatera nodular  foci in the upper and lower lobes, the largest in the LLL measuring 2.1 cm.    -Follow up outpatient.  -Mild patchy bilateral ground-glass infiltrates, pneumonitis vs mild edema.  Single view CXR negative for acute findings.  Monitor.    Aneurysm of ascending aorta without rupture  -4.5 cm fusiform aneurysmal dilation of the ascending thoracic aorta noted on CTA Chest (PE study) obtained 12/29/2022.  -Follow up outpatient.    Weakness  Due to stroke  Aggressive PT/OT/SLP  Dispo planning pending therapy evals         12/30/2022 NAEO, neuro exam stable. Patient noted to be more lethargic this morning, repeat CTH ordered. Echo and therapy evals pending to complete stroke workup. Diet advanced to mechanical soft with nectar thick liquids.      STROKE DOCUMENTATION   Acute Stroke Times   Last Known Normal Date: 12/29/22  Last Known Normal Time: 2130  Symptom Onset Date: 12/29/22  Unknown Symptom Onset Date: Unknown Date  Symptom Onset Time: 1000  Unknown Symptom Onset Time: Unknown Time  Stroke Team Called Date: 12/29/22  Stroke Team Called Time: 1640  Stroke Team Arrival Date: 12/29/22  Stroke Team Arrival Time: 1645  CT Interpretation Time: 1646  Thrombolytic Therapy Recommended: No  CTA Interpretation Time: 1650  Thrombectomy Recommended: No    NIH Scale:  1a. Level of Consciousness: 2-->Not alert, requires repeated stimulation to attend, or is obtunded and requires strong or painful stimulation to make movements (not stereotyped)  1b. LOC Questions: 2-->Answers neither question correctly  1c. LOC Commands: 1-->Performs one task correctly  2. Best Gaze: 0-->Normal  3. Visual: 0-->No visual loss  4. Facial Palsy: 0-->Normal symmetrical movements  5a. Motor Arm, Left: 0-->No drift, limb holds 90 (or 45) degrees for full 10 secs  5b. Motor Arm, Right: 0-->No drift, limb holds 90 (or 45) degrees for full 10 secs  6a. Motor Leg, Left: 2-->Some effort against gravity, leg falls to bed by 5 secs, but has some effort against  gravity  6b. Motor Leg, Right: 2-->Some effort against gravity, leg falls to bed by 5 secs, but has some effort against gravity  7. Limb Ataxia: 0-->Absent  8. Sensory: 0-->Normal, no sensory loss  9. Best Language: 0-->No aphasia, normal  10. Dysarthria: 0-->Normal  11. Extinction and Inattention (formerly Neglect): 0-->No abnormality  Total (NIH Stroke Scale): 9       Modified Marl[place] Score: 3  Seun Coma Scale:    ABCD2 Score:    [id number] Score:   HAS -BLED Score:   ICH Score:   Hunt & Katz Classification:      Hemorrhagic change of an Ischemic Stroke: Does this patient have an ischemic stroke with hemorrhagic changes? No     Neurologic Chief Complaint: R MCA stroke    Subjective:     Interval History: Patient is seen for follow-up neurological assessment and treatment recommendations:   NAEO, neuro exam stable. Patient noted to be more lethargic this morning, repeat CTH ordered. Echo and therapy evals pending to complete stroke workup. Diet advanced to mechanical soft with nectar thick liquids.    HPI, Past Medical, Family, and Social History remains the same as documented in the initial encounter.     Review of Systems   Constitutional:  Negative for activity change and fatigue.   HENT:  Negative for drooling and trouble swallowing.    Eyes:  Negative for photophobia and visual disturbance.   Respiratory:  Negative for cough and shortness of breath.    Cardiovascular:  Negative for chest pain and palpitations.   Gastrointestinal:  Negative for nausea and vomiting.   Musculoskeletal:  Negative for neck pain and neck stiffness.   Skin:  Negative for rash and wound.   Neurological:  Positive for speech difficulty and weakness. Negative for facial asymmetry and headaches.   Psychiatric/Behavioral:  Negative for confusion. The patient is not nervous/anxious.    Scheduled Meds:   [START ON 12/31/2022] aspirin  81 mg Oral Daily    atorvastatin  40 mg Oral Daily    heparin (porcine)  5,000 Units Subcutaneous Q8H      Continuous Infusions:   sodium chloride 0.9%       PRN Meds:acetaminophen, labetaloL, sodium chloride 0.9%, sodium chloride 0.9%    Objective:     Vital Signs (Most Recent):  Temp: 98.3 °F (36.8 °C) (12/30/22 1153)  Pulse: 61 (12/30/22 1153)  Resp: 18 (12/30/22 1153)  BP: (!) 145/76 (12/30/22 1153)  SpO2: 96 % (12/30/22 1153)  BP Location: Right arm    Vital Signs Range (Last 24H):  Temp:  [96.1 °F (35.6 °C)-98.4 °F (36.9 °C)]   Pulse:  [52-77]   Resp:  [15-20]   BP: (145-190)/()   SpO2:  [94 %-99 %]   BP Location: Right arm    Physical Exam  Vitals and nursing note reviewed.   Constitutional:       General: She is not in acute distress.  HENT:      Head: Normocephalic.      Mouth/Throat:      Mouth: Mucous membranes are moist.   Eyes:      Conjunctiva/sclera: Conjunctivae normal.   Cardiovascular:      Rate and Rhythm: Normal rate.   Pulmonary:      Effort: Pulmonary effort is normal.   Abdominal:      General: Abdomen is flat.   Musculoskeletal:         General: Normal range of motion.   Skin:     General: Skin is warm and dry.   Neurological:      Mental Status: She is oriented to person, place, and time. She is lethargic.      Cranial Nerves: No dysarthria or facial asymmetry.      Motor: Weakness (BLE > BUE) present.      Coordination: Finger-Nose-Finger Test normal.      Comments: Limited due to lethargy   Psychiatric:         Attention and Perception: She is inattentive.         Mood and Affect: Mood normal.         Speech: Speech is delayed.      Comments: Limited due to lethargy       Neurological Exam:   LOC: drowsy  Attention Span: poor  Language: Unable to assess due to lethargy  Articulation: Unable to assess due to lethargy  Orientation: Unable to assess due to lethargy  Visual Fields: Full  Pupils (CN II, III): PERRL  Facial Movement (CN VII): Symmetric facial expression    Motor: Arm left  Normal 5/5  Leg left  Paresis: 3/5  Arm right  Normal 5/5  Leg right Paresis: 3/5  Sensation: Intact  to light touch, temperature and vibration    Laboratory:  CMP:   Recent Labs   Lab 12/30/22  0341   CALCIUM 9.4   ALBUMIN 3.6   PROT 6.6      K 3.9   CO2 25      BUN 9*   CREATININE 1.0   ALKPHOS 87   ALT 6*   AST 15   BILITOT 1.2*     CBC:   Recent Labs   Lab 12/30/22  0341   WBC 10.16   RBC 5.89*   HGB 16.5*   HCT 53.0*      MCV 90   MCH 28.0   MCHC 31.1*     Lipid Panel:   Recent Labs   Lab 12/29/22  1729   CHOL 224*   LDLCALC 131.6   HDL 54   TRIG 192*     Coagulation:   Recent Labs   Lab 12/30/22  0341   INR 1.0   APTT 31.7     Hgb A1C:   Recent Labs   Lab 12/29/22  1729   HGBA1C 5.1     TSH:   Recent Labs   Lab 12/29/22  1831   TSH 2.987       Diagnostic Results     Brain Imaging   MRI brain without contrast 12/29/22  Study is limited by motion artifact.   Scattered small acute infarctions throughout the right cerebral hemisphere, largest within the posterior right parietal lobe.       Vessel Imaging   CTA stroke multiphase 12/29/22  1. CT head shows no acute intracranial abnormalities.   2. No large vessel occlusion.  Short segment of high-grade stenosis of the distal M1 segment right MCA.  There is flow within the M2 segments of the right MCA.  Moderate to high-grade stenosis in the proximal M2 segment superior division left MCA.  Multifocal irregularity of the bilateral anterior, middle, and posterior cerebral arteries as well as the intracranial vertebral arteries, likely atherosclerotic disease.   3. Calcific atherosclerosis at the bilateral carotid bifurcations with approximately 50% stenosis on the right.  Calcified plaque in the intracranial internal carotid arteries with suspected area of moderate stenosis on the right.   4. Thrombus within the left pulmonary artery.  Eccentric location suggestive of chronic thrombus, although no priors available for comparison.  Consider dedicated CTA PE protocol when clinically stable.   5. Chronic microvascular ischemic change.   6. Several  irregular likely endobronchial opacities in the partially visualized right lung as detailed above, likely mucous plugging.   7. Additional findings as above.       Cardiac Imaging   TTE -- pending        FRIDA Guidry  [facility] Stroke Center  Department of Vascular Neurology   Amol crow - Neurosurgery [facility])

## 2022-12-30 NOTE — ASSESSMENT & PLAN NOTE
89 year old female with past medical history of dementia, HTN, and DM who presented to ED with c/o slurred speech and weakness. CTA stroke multiphase negative for acute process, shows multifocal moderate-severe ICAD. MRI brain with acute infarctions in the R MCA territory. Suspect etiology at this time likely ICAD.    NAEO, neuro exam stable although patient more lethargic on exam this morning limiting exam. Repeat CTH ordered. Echo and therapy evals pending to complete stroke workup. Ideally recommend DAPT with ASA + Plavix for stroke prevention due to ICAD, but if determined high risk will continue with ASA monotherapy.      Antithrombotics for secondary stroke prevention: Antiplatelets: Aspirin: 81 mg daily    Statins for secondary stroke prevention and hyperlipidemia, if present:   Statins: Atorvastatin- 40 mg daily    Aggressive risk factor modification: HTN, HLD, AAD, ? neoplasm     Rehab efforts: The patient has been evaluated by a stroke team provider and the therapy needs have been fully considered based off the presenting complaints and exam findings. The following therapy evaluations are needed: PT evaluate and treat, OT evaluate and treat, SLP evaluate and treat    Diagnostics ordered/pending: TTE to assess cardiac function/status     VTE prophylaxis: Heparin 5000 units SQ every 8 hours  Mechanical prophylaxis: Place SCDs    BP parameters: Infarct: No intervention, SBP <220

## 2022-12-30 NOTE — PT/OT/SLP EVAL
Speech Language Pathology Evaluation  Bedside Swallow    Patient Name:  Franny Arnold   MRN:  [id number]  Admitting Diagnosis: Thrombotic stroke involving right middle cerebral artery    Recommendations:                 General Recommendations:  Dysphagia therapy and Speech language evaluation  Diet recommendations:  Mechanical soft, [place] Thick   Aspiration Precautions: Assistance with meals and Assistance with thickening liquids, HOB to 90 degrees, Meds crushed in puree, No straws, Remain upright 30 minutes post meal, and Strict aspiration precautions   General Precautions: Standard, aspiration, fall, nectar thick  Communication strategies:  none    History:     Past Medical History:   Diagnosis Date    Basal cell carcinoma     Dementia        Past Surgical History:   Procedure Laterality Date    APPENDECTOMY      INSERTION OF INTRAMEDULLARY ZAYNAB Left 1/2/2020    Procedure: INSERTION, INTRAMEDULLARY ZAYNAB;  Surgeon: Adithya Addison MD;  Location: [facility] OR [address] [place] [address];  Service: Orthopedics;  Laterality: Left;    TONSILLECTOMY         Social History: Patient lives with tricia.    Prior diet: tricia.    Occupation/hobbies/homemaking: na.    Subjective     No family   Patient goals: tricia     Pain/Comfort:  Pain Rating 1: 0/10  Pain Rating Post-Intervention 1: 0/10    Respiratory Status: Room air    Objective:     Oral Musculature Evaluation  Oral Musculature: general weakness  Dentition: present and adequate  Secretion Management: adequate  Mucosal Quality: dry  Mandibular Strength and Mobility: impaired  Oral Labial Strength and Mobility: impaired coordination  Lingual Strength and Mobility: impaired strength  Velar Elevation: impaired  Buccal Strength and Mobility: impaired  Volitional Cough: fair- weak  Volitional Swallow: not elicited  Voice Prior to PO Intake: adequate intensity    Bedside Swallow Eval:   Consistencies Assessed:  Thin liquids teaspoon of water x3 trials  Nectar thick liquids 3 teaspoons then self fed 4  sips via edge of cup  Puree 2 teaspoons   Solids 2 bites of cracker      Oral Phase:   Slow oral transit time    Pharyngeal Phase:   coughing/choking  delayed swallow initation    Treatment: Pt. Demonstrated coughing on 2 of 3 trials of water.  No coughing noted on nectar thick liquid, puree or soft solid consistences.  There was no oral residue noted following the swallow.  Education provided re safe swallow strategies and aspiration precautions.     Assessment:     Franny Arnold is a 89 y.o. female with an SLP diagnosis of Dysphagia.  She presents with coughing with thin liquids..    Goals:   Multidisciplinary Problems       SLP Goals          Problem: SLP    Goal Priority Disciplines Outcome   SLP Goal     SLP Ongoing, Progressing   Description: Goals due 1/6/2022  1.  Tolerate [facility] soft diet with nectar thick liquids with no s/s of aspiration  2.  Pt. Will participate in speech language eval                       Plan:     Patient to be seen:  4 x/week   Plan of Care expires:  01/27/23  Plan of Care reviewed with:  patient   SLP Follow-Up:  Yes       Discharge recommendations:  other (see comments)   Barriers to Discharge:  None    Time Tracking:     SLP Treatment Date:   12/30/22  Speech Start Time:  0720  Speech Stop Time:  0738     Speech Total Time (min):  18 min    Billable Minutes: Eval Swallow and Oral Function 10 and Self Care/Home Management Training 8    12/30/2022

## 2022-12-30 NOTE — H&P
See consult note dated 12/29/2022 for full assessment.  MRI Brain with multiple areas of acute infarction in the R MCA territory.  The patient is admitted to Vascular Neurology for further evaluation.      Silvia Perdomo DNP  [facility] Stroke Center  Department of Vascular Neurology   [facility]  [phone]

## 2022-12-30 NOTE — SUBJECTIVE & OBJECTIVE
Neurologic Chief Complaint: R MCA stroke    Subjective:     Interval History: Patient is seen for follow-up neurological assessment and treatment recommendations:   NAEO, neuro exam stable. Patient noted to be more lethargic this morning, repeat CTH ordered. Echo and therapy evals pending to complete stroke workup. Diet advanced to mechanical soft with nectar thick liquids.    HPI, Past Medical, Family, and Social History remains the same as documented in the initial encounter.     Review of Systems   Constitutional:  Negative for activity change and fatigue.   HENT:  Negative for drooling and trouble swallowing.    Eyes:  Negative for photophobia and visual disturbance.   Respiratory:  Negative for cough and shortness of breath.    Cardiovascular:  Negative for chest pain and palpitations.   Gastrointestinal:  Negative for nausea and vomiting.   Musculoskeletal:  Negative for neck pain and neck stiffness.   Skin:  Negative for rash and wound.   Neurological:  Positive for speech difficulty and weakness. Negative for facial asymmetry and headaches.   Psychiatric/Behavioral:  Negative for confusion. The patient is not nervous/anxious.    Scheduled Meds:   [START ON 12/31/2022] aspirin  81 mg Oral Daily    atorvastatin  40 mg Oral Daily    heparin (porcine)  5,000 Units Subcutaneous Q8H     Continuous Infusions:   sodium chloride 0.9%       PRN Meds:acetaminophen, labetaloL, sodium chloride 0.9%, sodium chloride 0.9%    Objective:     Vital Signs (Most Recent):  Temp: 98.3 °F (36.8 °C) (12/30/22 1153)  Pulse: 61 (12/30/22 1153)  Resp: 18 (12/30/22 1153)  BP: (!) 145/76 (12/30/22 1153)  SpO2: 96 % (12/30/22 1153)  BP Location: Right arm    Vital Signs Range (Last 24H):  Temp:  [96.1 °F (35.6 °C)-98.4 °F (36.9 °C)]   Pulse:  [52-77]   Resp:  [15-20]   BP: (145-190)/()   SpO2:  [94 %-99 %]   BP Location: Right arm    Physical Exam  Vitals and nursing note reviewed.   Constitutional:       General: She is not in  acute distress.  HENT:      Head: Normocephalic.      Mouth/Throat:      Mouth: Mucous membranes are moist.   Eyes:      Conjunctiva/sclera: Conjunctivae normal.   Cardiovascular:      Rate and Rhythm: Normal rate.   Pulmonary:      Effort: Pulmonary effort is normal.   Abdominal:      General: Abdomen is flat.   Musculoskeletal:         General: Normal range of motion.   Skin:     General: Skin is warm and dry.   Neurological:      Mental Status: She is oriented to person, place, and time. She is lethargic.      Cranial Nerves: No dysarthria or facial asymmetry.      Motor: Weakness (BLE > BUE) present.      Coordination: Finger-Nose-Finger Test normal.      Comments: Limited due to lethargy   Psychiatric:         Attention and Perception: She is inattentive.         Mood and Affect: Mood normal.         Speech: Speech is delayed.      Comments: Limited due to lethargy       Neurological Exam:   LOC: drowsy  Attention Span: poor  Language: Unable to assess due to lethargy  Articulation: Unable to assess due to lethargy  Orientation: Unable to assess due to lethargy  Visual Fields: Full  Pupils (CN II, III): PERRL  Facial Movement (CN VII): Symmetric facial expression    Motor: Arm left  Normal 5/5  Leg left  Paresis: 3/5  Arm right  Normal 5/5  Leg right Paresis: 3/5  Sensation: Intact to light touch, temperature and vibration    Laboratory:  CMP:   Recent Labs   Lab 12/30/22  0341   CALCIUM 9.4   ALBUMIN 3.6   PROT 6.6      K 3.9   CO2 25      BUN 9*   CREATININE 1.0   ALKPHOS 87   ALT 6*   AST 15   BILITOT 1.2*     CBC:   Recent Labs   Lab 12/30/22  0341   WBC 10.16   RBC 5.89*   HGB 16.5*   HCT 53.0*      MCV 90   MCH 28.0   MCHC 31.1*     Lipid Panel:   Recent Labs   Lab 12/29/22  1729   CHOL 224*   LDLCALC 131.6   HDL 54   TRIG 192*     Coagulation:   Recent Labs   Lab 12/30/22  0341   INR 1.0   APTT 31.7     Hgb A1C:   Recent Labs   Lab 12/29/22  1729   HGBA1C 5.1     TSH:   Recent Labs    Lab 12/29/22  1831   TSH 2.987       Diagnostic Results     Brain Imaging   MRI brain without contrast 12/29/22  Study is limited by motion artifact.   Scattered small acute infarctions throughout the right cerebral hemisphere, largest within the posterior right parietal lobe.       Vessel Imaging   CTA stroke multiphase 12/29/22  1. CT head shows no acute intracranial abnormalities.   2. No large vessel occlusion.  Short segment of high-grade stenosis of the distal M1 segment right MCA.  There is flow within the M2 segments of the right MCA.  Moderate to high-grade stenosis in the proximal M2 segment superior division left MCA.  Multifocal irregularity of the bilateral anterior, middle, and posterior cerebral arteries as well as the intracranial vertebral arteries, likely atherosclerotic disease.   3. Calcific atherosclerosis at the bilateral carotid bifurcations with approximately 50% stenosis on the right.  Calcified plaque in the intracranial internal carotid arteries with suspected area of moderate stenosis on the right.   4. Thrombus within the left pulmonary artery.  Eccentric location suggestive of chronic thrombus, although no priors available for comparison.  Consider dedicated CTA PE protocol when clinically stable.   5. Chronic microvascular ischemic change.   6. Several irregular likely endobronchial opacities in the partially visualized right lung as detailed above, likely mucous plugging.   7. Additional findings as above.       Cardiac Imaging   TTE -- pending

## 2022-12-30 NOTE — HOSPITAL COURSE
Ms. Franny Arnold was admitted to Vascular Neurology service for R CMA stroke, not candidate for acute intervention with thrombolytics/thrombectomy. Suspect etiology to be large artery atherosclerosis at this time. Hospital course complicated by waxing/waning confusion felt likely dementia exacerbated by acute delirium, delirium precautions in place. During hospital stay evaluations completed by PT/OT/SLP services, dispo recommendations provided and patient discharged SNF for continued therapy. Inpatient acute stroke workup completed and patient stable for discharge. Please see all appropriate medication changes, imaging results, and necessary follow-up below.

## 2022-12-30 NOTE — PT/OT/SLP EVAL
Occupational Therapy   Co-Evaluation and Treatment  Co-evaluation/treatment performed due to patient's multiple deficits requiring two skilled therapists to appropriately and safely assess patient's strength and endurance while facilitating functional tasks in addition to accommodating for patient's activity tolerance.      Name: Franny Arnold  MRN: [id number]  Admitting Diagnosis: Thrombotic stroke involving right middle cerebral artery  Recent Surgery: * No surgery found *      Recommendations:     Discharge Recommendations: nursing facility, skilled  Discharge Equipment Recommendations:  none  Barriers to discharge:  Other (Comment) (increased (A) required)    Assessment:     Franny Arnold is a 89 y.o. female with a medical diagnosis of Thrombotic stroke involving right middle cerebral artery.  She presents with the following performance deficits affecting function: weakness, impaired endurance, impaired self care skills, impaired functional mobility, gait instability, impaired balance, impaired cognition, decreased coordination, decreased lower extremity function, decreased upper extremity function, decreased safety awareness, impaired cardiopulmonary response to activity.  Pt A&Ox1, disoriented to place and reorientation provided. Pt unable to recall place at end of session. Pt confused throughout session with fair command following, requiring (A) for problem solving opening toothpaste. Stood from EOB w/ mod Ax2 and BLE blocked; pt with heavy posterior lean and not following cues to shift weight forward. Pt would benefit from continued skilled acute OT services in order to maximize independence and safety with ADLs and functional mobility to ensure safe return to PLOF in the least restrictive environment. OT recommending SNF once pt is medically appropriate for d/c.      Rehab Prognosis: Good; patient would benefit from acute skilled OT services to address these deficits and reach maximum level of function.        Plan:     Patient to be seen 3 x/week to address the above listed problems via self-care/home management, therapeutic activities, therapeutic exercises, neuromuscular re-education  Plan of Care Expires: 01/28/23  Plan of Care Reviewed with: patient, family    Subjective     Chief Complaint: none stated  Patient/Family Comments/goals: for pt to regain prior level of independence and be able to walk w/ RW    Occupational Profile:  Living Environment: Pt live with her niece in a [facility] with no DE. Pt has a tub/shower combo with a grab bar and utilizes a shower chair.   Previous level of function: IND with ADLs with niece providing SBA during most functional mobility. Pt uses RW in the home and wheelchair for community mobility.  Roles and Routines: aunt  Equipment Used at Home: shower chair, walker, rolling, wheelchair, other (see comments) (toilet frame. Pt also has bath bench and bedside commode but does not use.)  Assistance upon Discharge: Pt will have 24/hr assist from her niece    Pain/Comfort:  Pain Rating 1: 0/10  Pain Rating Post-Intervention 1: 0/10    Patients cultural, spiritual, Yazidi conflicts given the current situation: no    Objective:     Communicated with: Nsg prior to session.  Patient found supine with bed alarm, telemetry, SCD, PureWick upon OT entry to room.    General Precautions: Standard, aspiration, nectar thick, fall  Orthopedic Precautions: N/A  Braces: N/A  Respiratory Status: Room air    Occupational Performance:    Bed Mobility:    Patient completed Rolling/Turning to Left with  moderate assistance  Patient completed Scooting/Bridging with total assistance and 2 persons  Patient completed Supine to Sit with moderate assistance and 2 persons  Patient completed Sit to Supine with moderate assistance and 2 persons    Functional Mobility/Transfers:  Patient completed Sit <> Stand Transfer x2 from EOB with moderate assistance and of 2 persons  with  hand-held assist w/ B feet blocked  to prevent them from sliding forward  Functional Mobility: deferred due to pt with significant posterior lean    Activities of Daily Living:  Grooming: stand by assistance and verbal cues to perform oral cares while seated EOB. Pt turning toothpaste cap wrong way to open, requiring vc for problem solving    Lower Body Dressing: pt cued to pull up R sock; pt leaning forward and touching top of sock, but ultimately requiring total A to adjust      Cognitive/Visual Perceptual:  Cognitive/Psychosocial Skills:     -       Oriented to: self only   -       Follows Commands/attention:Follows one-step commands  -       Communication: dysarthria  -       Memory: difficult to assess due to communication difficulty; pt unable to recall she was in hospital at end of session after being told at beginning of session  -       Safety awareness/insight to disability: impaired   -       Mood/Affect/Coping skills/emotional control: Appropriate to situation  Visual/Perceptual:      -Intact      Physical Exam:  Balance:    -       Static sitting: SBA  Postural examination/scapula alignment:    -       Rounded shoulders  -       Forward head  -       Kyphosis  Skin integrity: Visible skin intact  Edema:  None noted  Sensation:    -       Intact  Motor Planning:    -       WFL  Dominant hand:    -       Right  Upper Extremity Range of Motion:     -       Right Upper Extremity: WFL  -       Left Upper Extremity: WFL  Upper Extremity Strength: grossly 3+/5   Strength:    -       Right Upper Extremity: WFL  -       Left Upper Extremity: WFL  Fine Motor Coordination:    -       Intact  Right hand, finger to nose  -       Impaired  Left hand, finger to nose required increased time    AMPAC 6 Click ADL:  AMPAC Total Score: 14    Treatment & Education:  Therapist provided facilitation and instruction of proper body mechanics and fall prevention strategies during tasks listed above.  Instructed patient to use call light to have nursing staff  assist with needs/transfers.  Discussed OT POC and answered all questions within OT scope of practice.      Patient left HOB elevated with all lines intact, call button in reach, bed alarm on, Nsg notified, and family present    GOALS:   Multidisciplinary Problems       Occupational Therapy Goals          Problem: Occupational Therapy    Goal Priority Disciplines Outcome Interventions   Occupational Therapy Goal     OT, PT/OT Ongoing, Progressing    Description: Goals to be met by: 1/13/23     Patient will increase functional independence with ADLs by performing:    UE Dressing with Stand-by Assistance.  LE Dressing with Minimal Assistance.  Grooming while seated with Set-up Assistance.  Toileting from bedside commode with Moderate Assistance for hygiene and clothing management.   Toilet transfer to bedside commode with Moderate Assistance.                         History:     Past Medical History:   Diagnosis Date    Basal cell carcinoma     Dementia          Past Surgical History:   Procedure Laterality Date    APPENDECTOMY      INSERTION OF INTRAMEDULLARY ZAYNAB Left 1/2/2020    Procedure: INSERTION, INTRAMEDULLARY ZAYNAB;  Surgeon: Adithya Addison MD;  Location: [facility] OR [address] [place] [address];  Service: Orthopedics;  Laterality: Left;    TONSILLECTOMY         Time Tracking:     OT Date of Treatment: 12/30/22  OT Start Time: 1424  OT Stop Time: 1459  OT Total Time (min): 35 min    Billable Minutes:Evaluation 10  Self Care/Home Management 10  Neuromuscular Re-education 15    12/30/2022

## 2022-12-30 NOTE — ASSESSMENT & PLAN NOTE
-CTA Chest w/multiple bilatera nodular foci in the upper and lower lobes, the largest in the LLL measuring 2.1 cm.    -Follow up outpatient.  -Mild patchy bilateral ground-glass infiltrates, pneumonitis vs mild edema.  Single view CXR negative for acute findings.  Monitor.

## 2022-12-30 NOTE — PLAN OF CARE
Problem: Occupational Therapy  Goal: Occupational Therapy Goal  Description: Goals to be met by: 1/13/23     Patient will increase functional independence with ADLs by performing:    UE Dressing with Stand-by Assistance.  LE Dressing with Minimal Assistance.  Grooming while seated with Set-up Assistance.  Toileting from bedside commode with Moderate Assistance for hygiene and clothing management.   Toilet transfer to bedside commode with Moderate Assistance.    Outcome: Ongoing, Progressing

## 2022-12-30 NOTE — PLAN OF CARE
Mech soft diet with nectar thick liquids recommended.  Crush meds in puree bolus.  Problem: SLP  Goal: SLP Goal  Description: Goals due 1/6/2022  1.  Tolerate mech soft diet with nectar thick liquids with no s/s of aspiration  2.  Pt. Will participate in speech language eval  Outcome: Ongoing, Progressing

## 2022-12-30 NOTE — ED NOTES
Telemetry Verification   Patient placed on Telemetry Box  Verified with War Room  Box # [id number]   Monitor Tech    Rate 64   Rhythm NSR

## 2022-12-30 NOTE — ED NOTES
Telemetry Verification   Patient placed on Telemetry Box  Verified with War Room  Box # [id number]   Monitor Tech    Rate 67    Rhythm NSR

## 2022-12-30 NOTE — ASSESSMENT & PLAN NOTE
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  -Follow up outpatient.

## 2022-12-30 NOTE — PLAN OF CARE
Step 1 - Admit - Current ([facility] - IP ISCHEMIC STROKE  - [facility])  RN: Depression Screen completed and passed  Outcome: Met  RN: Harrington Screen completed within 4 hours of admit and medication route accurately determined  Outcome: Met  RN: Education booklet given on day of admit  Outcome: Met  RN: SCDs applied  Outcome: Met  RN: Complete mobility protocol  Outcome: Met     Problem: Adjustment to Illness (Stroke, Ischemic/Transient Ischemic Attack)  Goal: Optimal Coping  Outcome: Ongoing, Progressing  Intervention: Support Psychosocial Response to Stroke  Flowsheets (Taken 12/30/2022 1615)  Supportive Measures:   relaxation techniques promoted   verbalization of feelings encouraged  Family/Support System Care:   caregiver stress acknowledged   support provided     Problem: Cognitive Impairment (Stroke, Ischemic/Transient Ischemic Attack)  Goal: Optimal Cognitive Function  Outcome: Ongoing, Progressing  Intervention: Optimize Cognitive Function  Flowsheets (Taken 12/30/2022 1615)  Reorientation Measures:   calendar in view   clock in view  Sensory Stimulation Regulation: quiet environment promoted     Problem: Adult Inpatient Plan of Care  Goal: Patient-Specific Goal (Individualized)  Outcome: Ongoing, Progressing     Problem: Infection  Goal: Absence of Infection Signs and Symptoms  Outcome: Ongoing, Progressing     Problem: Skin Injury Risk Increased  Goal: Skin Health and Integrity  Outcome: Ongoing, Progressing  Intervention: Optimize Skin Protection  Flowsheets (Taken 12/30/2022 1615)  Pressure Reduction Techniques: weight shift assistance provided

## 2022-12-30 NOTE — PLAN OF CARE
[facility] (Hospital)  Initial Discharge Assessment       Primary Care Provider: Dinesh Wylie MD    Admission Diagnosis: Stroke [I63.9]  Thrombotic stroke involving right middle cerebral artery [I63.311]    Admission Date: 12/29/2022  Expected Discharge Date:     Discharge Barriers Identified: (P) None    Payor: MEDICARE / Plan: MEDICARE PART A & B / Product Type: Government /     Extended Emergency Contact Information  Primary Emergency Contact: Chela Michaud  Home Phone: [phone]  Relation: Relative    Discharge Plan A: (P) Home Health, Home with family  Discharge Plan B: (P) Skilled Nursing Facility      [facility]  [address] [facility] [address]  Phone: [phone] Fax: [phone]    [facility] [employer] #[id number] [address] AT [address] [address]  Phone: [phone] Fax: [phone]      Initial Assessment (most recent)       Adult Discharge Assessment - 12/30/22 1318          Discharge Assessment    Assessment Type Discharge Planning Assessment (P)      Confirmed/corrected address, phone number and insurance Yes (P)      Source of Information family (P)      If unable to respond/provide information was family/caregiver contacted? Yes (P)      Contact Name/Number niece/caregiver Chela Michaud [phone] (P)      When was your last doctors appointment? 11/16/22 (P)      Communicated RUTH with patient/caregiver Yes (P)      Reason For Admission stroke (P)      People in Home other relative(s) (P)    lives with nidary and her     Facility Arrived From: home (P)      Do you expect to return to your current living situation? Yes (P)      Do you have help at home or someone to help you manage your care at home? Yes (P)      Who are your caregiver(s) and their phone number(s)? Jose Michaud [phone] (P)      Prior to hospitilization cognitive status: Alert/Oriented (P)       Current cognitive status: Unable to Assess (P)      Walking or Climbing Stairs ambulation difficulty, requires equipment (P)      Mobility Management walker, WC (P)      Dressing/Bathing bathing difficulty, requires equipment (P)      Dressing/Bathing Management has shower chair and bars (P)      Do you have any problems with: Errands/Grocery (P)    family provides    Home Accessibility wheelchair accessible (P)      Equipment Currently Used at Home walker, rolling;wheelchair;grab bar;hospital bed;shower chair (P)      Readmission within 30 days? No (P)      Patient currently being followed by outpatient case management? No (P)      Do you currently have service(s) that help you manage your care at home? No (P)      Do you take prescription medications? No (P)      Who is going to help you get home at discharge? nicci York (P)      How do you get to doctors appointments? family or friend will provide (P)      Are you on dialysis? No (P)      Do you take coumadin? No (P)      Discharge Plan A Home Health;Home with family (P)      Discharge Plan B Skilled Nursing Facility (P)      DME Needed Upon Discharge  none (P)      Discharge Plan discussed with: Caregiver (P)      Name(s) and Number(s) Chela (P)      Discharge Barriers Identified None (P)         OTHER    Name(s) of People in Home nicci York and her  Marcus (P)                    ANNA met with patient and her niece/caregiver Chela at bedside.  Patient lethargic and unable to complete assessment so questions answered by Chela.  Patient lives with Chela and her  Marcus in a [facility] with one step up.  Patient has a WC, walker, hospital bed, shower chair, shower bars and toilet bars at home.  Chela has been the patient's caregiver for about 3 years.  Patient is not on HD or Coumadin and does not take medications.  Family would prefer the patient to discharge home with HH (PT/OT, nursing, aide) once med cleared.  Pending therapy recs.      Francoise Murphy,  LMSW  [facility]  [phone]

## 2022-12-30 NOTE — ASSESSMENT & PLAN NOTE
-Noted on CTA chest obtained on 12/29/2022.  -Renal US with hydronephrosis (L >>> R) and non-obstructing nephrolithiasis of R kidney  -Nephrology consulted, recs appreciated

## 2022-12-30 NOTE — NURSING
Pt arrived to unit via stretcher with x1 transporter and niece in attendance.  Alert but oriented only to self.  Per niece she has a Hx of Alzheimers/dementia.  Pt slightly [place].  Niece reports no longer has hearing aids.  Dysarthria noted.  Rash/old age skin noted to area under bilateral breasts/sternal area otherwise skin intact.  BM noted on arrival.  Olena-care provided.  Non-productive cough noted.  Pt unable to follow commands/directions for Harrington screen and thus failed.  Will await ST eval.  VSS.  See flowsheet for full assessment.  No s/s of pain or discomfort noted.  Fall/safety precautions initiated.

## 2022-12-30 NOTE — ED PROVIDER NOTES
Encounter Date: 12/29/2022       History     Chief Complaint   Patient presents with    Slurred Speech    Weakness     Patient with slurred speech and generalized weakness since 0930. Patient with hx alzheimers dementia. Patient is A&Ox2 and following commands.      HPI  Patient is a 89-year-old female with history of dementia but no other reported medical problems and is not on any reported medications who presents for concerns for stroke.  Patient is accompanied by her niece who provides most of the history.  She states that the patient went to bed last night at 9:30 p.m. in her usual state of health.  The patient woke up this morning at 10:00 a.m. and reportedly had a left-sided facial droop and difficulty speaking.  She was also having difficulty getting up from the toilet while using the bathroom.  Their PCP instructed him to come to the emergency department for evaluation given concern for acute stroke.  Patient denies any complaints of pain.  She is pleasantly confused but has no medical complaints at this time.  Her niece denies any obvious new extremity weakness.    Review of patient's allergies indicates:  No Known Allergies  Past Medical History:   Diagnosis Date    Basal cell carcinoma     Dementia      Past Surgical History:   Procedure Laterality Date    APPENDECTOMY      INSERTION OF INTRAMEDULLARY ZAYNAB Left 1/2/2020    Procedure: INSERTION, INTRAMEDULLARY ZAYNAB;  Surgeon: Adithya Addison MD;  Location: [facility] OR [address] [place] [address];  Service: Orthopedics;  Laterality: Left;    TONSILLECTOMY       Family History   Family history unknown: Yes     Social History     Tobacco Use    Smoking status: Never    Smokeless tobacco: Never   Substance Use Topics    Alcohol use: Not Currently    Drug use: Never     Review of Systems  Unable to obtain full review of systems due to dementia.  Pertinent review of systems listed in the HPI and obtained from the patient's family member.    Physical Exam     Initial Vitals  [12/29/22 1606]   BP Pulse Resp Temp SpO2   (!) 180/81 68 16 97.7 °F (36.5 °C) 96 %      MAP       --         Physical Exam  Constitutional: No acute distress, nontoxic appearing  Respiratory: Non-labored, lungs clear  Cardiovascular: Well perfused, normal rate, regular rhythm  Gastrointestinal: Soft, non-tender, non-distended  Integumentary: Warm and dry  Musculoskeletal: No deformity  Neurological: Awake and alert, oriented to person but not place or time or situation, 5/5 motor in all extremities, sensation light touch intact in all extremities, patient has mild left-sided lower facial droop, forehead spared, otherwise other cranial nerves 2-12 grossly intact, speech appears normal   Psychiatric: Cooperative     ED Course   Critical Care    Date/Time: 12/30/2022 12:26 AM  Performed by: Hilda Francis MD  Authorized by: Hilda Francis MD   Direct patient critical care time: 15 minutes  Additional history critical care time: 5 minutes  Ordering / reviewing critical care time: 12 minutes  Documentation critical care time: 14 minutes  Consulting other physicians critical care time: 10 minutes  Consult with family critical care time: 8 minutes  Other critical care time: 0 minutes  Total critical care time (exclusive of procedural time) : 64 minutes  Critical care time was exclusive of separately billable procedures and treating other patients and teaching time.  Critical care was necessary to treat or prevent imminent or life-threatening deterioration of the following conditions: CNS failure or compromise.      Labs Reviewed   PROTIME-INR - Abnormal; Notable for the following components:       Result Value    Prothrombin Time 13.0 (*)     INR 1.3 (*)     All other components within normal limits   CBC W/ AUTO DIFFERENTIAL - Abnormal; Notable for the following components:    RBC 5.69 (*)     Hematocrit 50.5 (*)     MCH 26.9 (*)     MCHC 30.3 (*)     Gran % 76.0 (*)     Mono % 3.0 (*)     Platelet Estimate  Clumped (*)     All other components within normal limits   COMPREHENSIVE METABOLIC PANEL - Abnormal; Notable for the following components:    Sodium 135 (*)     ALT 8 (*)     Anion Gap 7 (*)     eGFR 57.8 (*)     All other components within normal limits   LIPID PANEL - Abnormal; Notable for the following components:    Cholesterol 224 (*)     Triglycerides 192 (*)     All other components within normal limits   URINALYSIS, REFLEX TO URINE CULTURE - Abnormal; Notable for the following components:    Color, UA Colorless (*)     Specific Gravity, UA >1.030 (*)     Occult Blood UA 3+ (*)     All other components within normal limits    Narrative:     Specimen Source->Urine   URINALYSIS MICROSCOPIC - Abnormal; Notable for the following components:    WBC, UA 12 (*)     All other components within normal limits    Narrative:     Specimen Source->Urine   ISTAT PROCEDURE - Abnormal; Notable for the following components:    POC PH 7.274 (*)     POC PCO2 75.1 (*)     POC HCO3 34.8 (*)     POC SATURATED O2 69 (*)     POC TCO2 37 (*)     All other components within normal limits   ISTAT PROCEDURE - Abnormal; Notable for the following components:    POC PCO2 54.3 (*)     POC PO2 21 (*)     POC HCO3 33.7 (*)     POC SATURATED O2 33 (*)     POC TCO2 35 (*)     All other components within normal limits   CULTURE, URINE   TSH   TROPONIN I   B-TYPE NATRIURETIC PEPTIDE   APTT   TSH   TROPONIN I   B-TYPE NATRIURETIC PEPTIDE   HEMOGLOBIN A1C   HEMOGLOBIN A1C    Narrative:     add on hemoglobin a1c per reanna pérez  12/29/2022  23:38    POCT GLUCOSE, HAND-HELD DEVICE   POCT GLUCOSE          Imaging Results               US Retroperitoneal Complete (Final result)  Result time 12/29/22 23:52:31      Final result by Lele Dawson MD (12/29/22 23:52:31)                   Impression:      1. Technically difficult study.  2. Severe hydronephrosis on the left.  Recommend CT follow-up for better characterization.  3. Mild hydronephrosis on  the right also.  Follow-up recommended.  4. Nonobstructing nephrolithiasis of the right kidney.  5.  This report was flagged in Epic as abnormal.      Electronically signed by: Lele Galvan  Date:    12/29/2022  Time:    23:52               Narrative:    EXAMINATION:  US RETROPERITONEAL COMPLETE    CLINICAL HISTORY:  severe hydronephrosis on CTA w/unclear etiology;    TECHNIQUE:  Ultrasound of the kidneys and urinary bladder was performed including color flow and Doppler evaluation of the kidneys.    COMPARISON:  None.    FINDINGS:  Technically difficult study as the patient could not remain stationary, though unable to be positioned/rolled.    Right kidney: The right kidney measures 9.5 cm. No cortical thinning. No loss of corticomedullary distinction. Resistive index measures 0.65.  No mass. Few echogenic stones are noted in the right kidney upper pole.  Mild hydronephrosis on the right.    Left kidney: The left kidney measures 12.8 cm. Limited visualization of the left kidney.  Resistive index measures 0.76.  No soft tissue mass is detected.  No stones are detected.  Severe hydronephrosis on the left with limited visualization.  Bilateral ureteral jets are noted in the urinary bladder.    No focal abnormality of the urinary bladder.                                        MRI Brain Without Contrast (Final result)  Result time 12/29/22 22:24:53      Final result by John Obrien MD (12/29/22 22:24:53)                   Impression:      Study is limited by motion artifact.    Scattered small acute infarctions throughout the right cerebral hemisphere, largest within the posterior right parietal lobe.    This report was flagged in Epic as abnormal.    COMMUNICATION  This critical result was discovered/received at 2130. The critical information above was relayed directly by Raymond Gacria MD by telephone to Hilda Francis MD on 12/29/2022 at 2136.    Electronically signed by resident: Raymond  Radha  Date:    12/29/2022  Time:    21:25    Electronically signed by: John Obrien MD  Date:    12/29/2022  Time:    22:24               Narrative:    EXAMINATION:  MRI BRAIN WITHOUT CONTRAST    CLINICAL HISTORY:  Neuro deficit, acute, stroke suspected;    TECHNIQUE:  Multiplanar multisequence MR imaging of the brain was performed without intravenous contrast.    COMPARISON:  CTA stroke multiphase 12/29/2022    FINDINGS:  Study is limited by motion artifact.    Multiple scattered small areas of diffusion restriction throughout the right cerebral hemisphere, concerning for acute infarctions.  Largest area of diffusion restriction is located in the posterior right parietal lobe.  No hemorrhage.  No mass effect or midline shift.    Generalized cerebral volume loss with mild patchy T2/FLAIR hyperintensity in the supratentorial white matter, nonspecific but could reflect chronic microvascular ischemic changes.    Ventricles are prominent size due to compensatory enlargement.  No overt hydrocephalus.    Postoperative changes of bilateral globes.    Mastoid air cells and paranasal sinuses are unremarkable.                                        CTA Chest Non-Coronary (PE Studies) (Final result)  Result time 12/29/22 20:07:16      Final result by Lele Dawson MD (12/29/22 20:07:16)                   Impression:      1. No evidence of pulmonary embolism.  2. Mild fusiform aneurysmal dilation of the ascending thoracic aorta to approximately 4.5 cm.  Recommend surveillance.  3. Multiple small bilateral nodular foci in upper and lower lobes which are indeterminate.  See above comments.  Neoplasm is not excluded.  Largest lesions in the left lower lobe measuring 2.1 cm.  Follow-up recommended.  4. There is severe hydronephrosis of the left kidney incompletely visualized.  Follow-up is recommended.  5. Mild patchy bilateral ground-glass infiltrates may be associated with pneumonitis.  Mild edema is not excluded follow-up  recommended.  6. Moderate compression fracture of T10 with anterior wedging.  I favor chronic process.  7.  This report was flagged in Epic as abnormal.      Electronically signed by: Lele Galvan  Date:    12/29/2022  Time:    20:07               Narrative:    EXAMINATION:  CTA CHEST NON CORONARY (PE STUDIES)    CLINICAL HISTORY:  Pulmonary embolism (PE) suspected, unknown D-dimer;    TECHNIQUE:  Low dose axial images, sagittal and coronal reformations were obtained from the thoracic inlet to the lung bases following the IV administration of 100 mL of Omnipaque 350.  Contrast timing was optimized to evaluate the pulmonary arteries.  MIP images were performed.    COMPARISON:  None    FINDINGS:  Pulmonary arteries:Pulmonary arteries are adequately enhanced.No filling defects to indicate pulmonary embolism.    Thoracic soft tissues: Unremarkable.    Aorta: Mild fusiform dilation of the ascending thoracic aorta measuring 4.5 cm on axial 219 of series 2.  No dissection is detected.    Heart: Normal size. No effusion.    Lila/Mediastinum: No pathologic linda enlargement.    Airways: Patent.    Lungs/Pleura: There are multiple small nodular foci bilaterally in upper and lower lobes which are indeterminate.  Infectious, inflammatory or neoplastic processes are possible.  2.1 cm lesion in the left lower lobe on axial 287 of series 3 1.7 cm lesion right upper lobe on axial 102.    Mild patchy bilateral ground-glass infiltrates may be associated with pneumonitis.  Mild edema not excluded.  Follow-up recommended.    No effusion or pneumothorax.    Esophagus: Unremarkable.    Upper Abdomen: There is severe hydronephrosis on the left incompletely visualized.  Follow-up is recommended.    Bones: Moderate compression fracture of T10 with anterior wedging.  This is age indeterminate.  I favor a chronic process.                                       X-Ray Chest AP Portable (Final result)  Result time 12/29/22 17:30:37      Final  result by Gabriel Mendoza MD (12/29/22 17:30:37)                   Impression:      No detrimental change or radiographic acute intrathoracic process seen on this single view.      Electronically signed by: Gabriel Mendoza MD  Date:    12/29/2022  Time:    17:30               Narrative:    EXAMINATION:  XR CHEST AP PORTABLE    CLINICAL HISTORY:  Stroke;    TECHNIQUE:  Single frontal view of the chest was performed.    COMPARISON:  Chest radiograph 06/02/2022    FINDINGS:  Patient is somewhat rotated.  Monitoring leads and clothing artifact overlie the chest.    Cardiomediastinal silhouette is midline and grossly stable without convincing evidence of failure.  Pulmonary vasculature and hilar contours are within normal limits.    Stable blunting of the left costophrenic angle suggesting pleural thickening/scarring versus chronic trace left pleural effusion.  The lungs are otherwise well expanded without consolidation or pneumothorax.  No sizable right pleural effusion.  No acute osseous process seen.  PA and lateral views can be obtained.                                        CTA STROKE MULTI-PHASE (Final result)  Result time 12/29/22 18:01:37      Final result by Barrett Lu MD (12/29/22 18:01:37)                   Impression:      1. CT head shows no acute intracranial abnormalities.  2. No large vessel occlusion.  Short segment of high-grade stenosis of the distal M1 segment right MCA.  There is flow within the M2 segments of the right MCA.  Moderate to high-grade stenosis in the proximal M2 segment superior division left MCA.  Multifocal irregularity of the bilateral anterior, middle, and posterior cerebral arteries as well as the intracranial vertebral arteries, likely atherosclerotic disease.  3. Calcific atherosclerosis at the bilateral carotid bifurcations with approximately 50% stenosis on the right.  Calcified plaque in the intracranial internal carotid arteries with suspected area of moderate stenosis on  the right.  4. Thrombus within the left pulmonary artery.  Eccentric location suggestive of chronic thrombus, although no priors available for comparison.  Consider dedicated CTA PE protocol when clinically stable.  5. Chronic microvascular ischemic change.  6. Several irregular likely endobronchial opacities in the partially visualized right lung as detailed above, likely mucous plugging.  7. Additional findings as above.  This report was flagged in Epic as abnormal.    The critical information above was relayed directly by Tremaine Suh MD via [facility] Secure Chat to Hilda Francis MD who acknowledged receipt on 12/29/2022 at 17:05.    Electronically signed by resident: Tremaine Suh  Date:    12/29/2022  Time:    16:51    Electronically signed by: Barrett Lu MD  Date:    12/29/2022  Time:    18:01               Narrative:    EXAMINATION:  CTA STROKE MULTI-PHASE    CLINICAL HISTORY:  Neuro deficit, acute, stroke suspected;    TECHNIQUE:  Non contrast low dose axial images were obtained thought the head. CT angiogram was performed from the level of the arun to the top of the head following the IV administration of 100mL of Omnipaque 350.   Sagittal and coronal reconstructions and maximum intensity projection reconstructions were performed. Arterial stenosis percentages are based on NASCET measurement criteria.  Two additional phases of immediate post-contrast CTA images were performed through the head alone.    COMPARISON:  CT head 01/01/2020    FINDINGS:  CT head:    Generalized cerebral volume loss with compensatory enlargement of the ventricles.  Ventricles stable in size and configuration without evidence of hydrocephalus.    Brain parenchyma appears unchanged.  Patchy areas of hypoattenuation in the supratentorial white matter, nonspecific but likely chronic microvascular ischemic change.  No new parenchymal mass, hemorrhage, edema or CT evidence of acute major vascular distribution infarct.    No  extra-axial blood or fluid collections.    No acute displaced calvarial fractures.  Mastoid air cells and paranasal sinuses are essentially clear.      CTA:    Fusiform ectasia of the ascending aorta measuring up to 4.2 cm and of the proximal descending aorta measuring up to 3.1 cm.  The aortic arch maintains a normal branching pattern.    Tortuosity of the proximal common carotid arteries bilaterally.  Calcified plaque at the bilateral carotid bifurcations, right greater than left with approximately 50% stenosis per NASCET criteria on the right.  Scattered calcified plaque in the intracranial internal carotid arteries with suspected area of moderate stenosis on the right (axial series 5, image 366).    The vertebral origins are patent.  Tortuosity of the proximal left vertebral artery.  The cervical vertebral arteries are otherwise normal in course and caliber.  Left vertebral artery is dominant.  Mild multifocal irregularity of the V4 segments of the bilateral vertebral arteries, likely atherosclerotic disease.  Vertebrobasilar system is within normal limits without focal abnormality.    Multifocal irregularity of the M1 segments of the bilateral middle cerebral arteries with short segment of high-grade stenosis in the distal right M1 segment (axial series 5, image 368).  There is flow within the M2 segments of the right MCA.  Moderate to high-grade stenosis in the proximal M2 segment, superior division of the left MCA.    Mild multifocal irregularity of the bilateral anterior and posterior cerebral arteries without evidence of significant stenosis or occlusion.    No intracranial aneurysm formation.    Additional findings:    Thrombus in the left pulmonary artery located eccentrically in the lumen (axial series 5, image 28).  Several irregular, likely endobronchial opacities in the right upper lobe with the largest measuring up to 1.7 cm (axial series 5, image 83).  There is associated bronchiectasis distal to  this largest opacity.  Mosaic attenuation pattern of the visualized pulmonary parenchyma, a nonspecific finding which can be seen with edema, small airways disease, or infection/inflammation.  Aerated secretions in the proximal right mainstem bronchus at the arun.  Thyroid, submandibular, and parotid glands unremarkable.  Degenerative change of the cervical spine.  No suspicious osseous lesions.                                       Medications   sodium chloride 0.9% flush 10 mL (has no administration in time range)   sodium chloride 0.9% bolus 500 mL 500 mL (has no administration in time range)   labetalol 20 mg/4 mL (5 mg/mL) IV syring (has no administration in time range)   heparin (porcine) injection 5,000 Units (5,000 Units Subcutaneous Given 12/30/22 0004)   acetaminophen tablet 650 mg (has no administration in time range)   aspirin EC tablet 81 mg (has no administration in time range)   atorvastatin tablet 40 mg (has no administration in time range)   iohexoL (OMNIPAQUE 350) injection 100 mL (100 mLs Intravenous Given 12/29/22 1640)   iohexoL (OMNIPAQUE 350) injection 50 mL (50 mLs Intravenous Given 12/29/22 1848)   LORazepam injection 0.5 mg (0.5 mg Intravenous Given 12/29/22 2102)     Medical Decision Making:   History:   I obtained history from: someone other than patient.  Initial Assessment:   The patient's family member  Clinical Tests:   Lab Tests: Ordered and Reviewed  Radiological Study: Ordered and Reviewed  Medical Tests: Ordered and Reviewed  ED Management:  Patient here for stroke-like symptoms.  Last known normal at 9:30 p.m. yesterday evening.  Patient woke up this morning at 10:00 a.m. with slurred speech and left-sided facial weakness.  Her NIH stroke scale is difficult due to history of dementia as she is not able to answer the correct month and year.  Her only neurologic deficit is a left-sided facial droop.  Given the patient is within 24 hours of onset, code stroke was paged.  Neurology  at bedside.  Labs and imaging ordered.    Labs and imaging reviewed.  Head CT negative.  CTA without LVOT.  Patient did have a pulmonary artery filling defects were CTA chest was ordered.  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  I discussed with Radiology and there is no dissection present.  MRI ordered per neurology recommendations and does show multiple infarcts in the right cerebral hemisphere in the right MCA distribution.  Neurology notified.  Patient admitted to neurology service.  The patient and family at bedside were updated on plan and agreeable with admission.  Other:   I have discussed this case with another health care provider.       <> Summary of the Discussion: Vascular Neurology           ED Course as of 12/30/22 0026   Thu Dec 29, 2022   1733 EKG with normal sinus rhythm, rate 76, right bundle-branch block, no STEMI [NN]   2135 Discussed with radiology.  MRI with few scattered right cerebral hemisphere infarcts in the right MCA distribution with the largest in the right posterior parietal region. [NN]      ED Course User Index  [NN] Hilda Francis MD                 Clinical Impression:   Final diagnoses:  [I63.9] Stroke  [I63.311] Thrombotic stroke involving right middle cerebral artery        ED Disposition Condition    Admit                 Hilda Francis MD  12/30/22 0028

## 2022-12-31 PROBLEM — E44.0 MODERATE MALNUTRITION: Status: ACTIVE | Noted: 2022-12-31

## 2022-12-31 LAB
ALBUMIN SERPL BCP-MCNC: 3.4 G/DL (ref 3.5–5.2)
ALP SERPL-CCNC: 80 U/L (ref 55–135)
ALT SERPL W/O P-5'-P-CCNC: 8 U/L (ref 10–44)
ANION GAP SERPL CALC-SCNC: 11 MMOL/L (ref 8–16)
AST SERPL-CCNC: 19 U/L (ref 10–40)
BACTERIA UR CULT: NORMAL
BACTERIA UR CULT: NORMAL
BASOPHILS # BLD AUTO: 0.06 K/UL (ref 0–0.2)
BASOPHILS NFR BLD: 0.7 % (ref 0–1.9)
BILIRUB SERPL-MCNC: 1.1 MG/DL (ref 0.1–1)
BUN SERPL-MCNC: 14 MG/DL (ref 10–30)
CALCIUM SERPL-MCNC: 9.2 MG/DL (ref 8.7–10.5)
CHLORIDE SERPL-SCNC: 107 MMOL/L (ref 95–110)
CO2 SERPL-SCNC: 19 MMOL/L (ref 23–29)
CREAT SERPL-MCNC: 0.9 MG/DL (ref 0.5–1.4)
DIFFERENTIAL METHOD: ABNORMAL
EOSINOPHIL # BLD AUTO: 0.1 K/UL (ref 0–0.5)
EOSINOPHIL NFR BLD: 1.3 % (ref 0–8)
ERYTHROCYTE [DISTWIDTH] IN BLOOD BY AUTOMATED COUNT: 14.3 % (ref 11.5–14.5)
EST. GFR  (NO RACE VARIABLE): 57.8 ML/MIN/1.73 M^2
GLUCOSE SERPL-MCNC: 73 MG/DL (ref 70–110)
HCT VFR BLD AUTO: 55.1 % (ref 37–48.5)
HGB BLD-MCNC: 16.3 G/DL (ref 12–16)
IMM GRANULOCYTES # BLD AUTO: 0.04 K/UL (ref 0–0.04)
IMM GRANULOCYTES NFR BLD AUTO: 0.5 % (ref 0–0.5)
LYMPHOCYTES # BLD AUTO: 2.5 K/UL (ref 1–4.8)
LYMPHOCYTES NFR BLD: 29.5 % (ref 18–48)
MCH RBC QN AUTO: 27.5 PG (ref 27–31)
MCHC RBC AUTO-ENTMCNC: 29.6 G/DL (ref 32–36)
MCV RBC AUTO: 93 FL (ref 82–98)
MONOCYTES # BLD AUTO: 0.7 K/UL (ref 0.3–1)
MONOCYTES NFR BLD: 8.3 % (ref 4–15)
NEUTROPHILS # BLD AUTO: 5.1 K/UL (ref 1.8–7.7)
NEUTROPHILS NFR BLD: 59.7 % (ref 38–73)
NRBC BLD-RTO: 0 /100 WBC
PLATELET # BLD AUTO: 126 K/UL (ref 150–450)
PMV BLD AUTO: 11.8 FL (ref 9.2–12.9)
POTASSIUM SERPL-SCNC: 4.6 MMOL/L (ref 3.5–5.1)
PROT SERPL-MCNC: 6.6 G/DL (ref 6–8.4)
RBC # BLD AUTO: 5.93 M/UL (ref 4–5.4)
SODIUM SERPL-SCNC: 137 MMOL/L (ref 136–145)
WBC # BLD AUTO: 8.51 K/UL (ref 3.9–12.7)

## 2022-12-31 PROCEDURE — 94761 N-INVAS EAR/PLS OXIMETRY MLT: CPT

## 2022-12-31 PROCEDURE — 63600175 PHARM REV CODE 636 W HCPCS: Performed by: NURSE PRACTITIONER

## 2022-12-31 PROCEDURE — 36415 COLL VENOUS BLD VENIPUNCTURE: CPT | Performed by: NURSE PRACTITIONER

## 2022-12-31 PROCEDURE — 11000001 HC ACUTE MED/SURG PRIVATE ROOM

## 2022-12-31 PROCEDURE — 99233 PR SUBSEQUENT HOSPITAL CARE,LEVL III: ICD-10-PCS | Mod: ,,, | Performed by: PSYCHIATRY & NEUROLOGY

## 2022-12-31 PROCEDURE — 85025 COMPLETE CBC W/AUTO DIFF WBC: CPT | Performed by: NURSE PRACTITIONER

## 2022-12-31 PROCEDURE — 80053 COMPREHEN METABOLIC PANEL: CPT | Performed by: NURSE PRACTITIONER

## 2022-12-31 PROCEDURE — 99233 SBSQ HOSP IP/OBS HIGH 50: CPT | Mod: ,,, | Performed by: PSYCHIATRY & NEUROLOGY

## 2022-12-31 PROCEDURE — 25000003 PHARM REV CODE 250: Performed by: NURSE PRACTITIONER

## 2022-12-31 PROCEDURE — 25000003 PHARM REV CODE 250: Performed by: PHYSICIAN ASSISTANT

## 2022-12-31 RX ORDER — TALC
6 POWDER (GRAM) TOPICAL NIGHTLY
Status: DISCONTINUED | OUTPATIENT
Start: 2022-12-31 | End: 2023-01-04 | Stop reason: HOSPADM

## 2022-12-31 RX ORDER — CLOPIDOGREL BISULFATE 75 MG/1
75 TABLET ORAL DAILY
Status: DISCONTINUED | OUTPATIENT
Start: 2022-12-31 | End: 2023-01-04 | Stop reason: HOSPADM

## 2022-12-31 RX ADMIN — CLOPIDOGREL BISULFATE 75 MG: 75 TABLET ORAL at 09:12

## 2022-12-31 RX ADMIN — HEPARIN SODIUM 5000 UNITS: 5000 INJECTION INTRAVENOUS; SUBCUTANEOUS at 09:12

## 2022-12-31 RX ADMIN — HEPARIN SODIUM 5000 UNITS: 5000 INJECTION INTRAVENOUS; SUBCUTANEOUS at 02:12

## 2022-12-31 RX ADMIN — HEPARIN SODIUM 5000 UNITS: 5000 INJECTION INTRAVENOUS; SUBCUTANEOUS at 05:12

## 2022-12-31 RX ADMIN — ATORVASTATIN CALCIUM 40 MG: 20 TABLET, FILM COATED ORAL at 09:12

## 2022-12-31 RX ADMIN — Medication 6 MG: at 09:12

## 2022-12-31 NOTE — SUBJECTIVE & OBJECTIVE
Past Medical History:   Diagnosis Date    Basal cell carcinoma     Dementia        Past Surgical History:   Procedure Laterality Date    APPENDECTOMY      INSERTION OF INTRAMEDULLARY ZAYNAB Left 1/2/2020    Procedure: INSERTION, INTRAMEDULLARY ZAYNAB;  Surgeon: Adithya Addison MD;  Location: [facility] OR [address] [place] [address];  Service: Orthopedics;  Laterality: Left;    TONSILLECTOMY         Review of patient's allergies indicates:  No Known Allergies    Family History       Family history is unknown by patient.            Tobacco Use    Smoking status: Never    Smokeless tobacco: Never   Substance and Sexual Activity    Alcohol use: Not Currently    Drug use: Never    Sexual activity: Not Currently       Review of Systems   Constitutional:  Negative for activity change, appetite change, chills, fever and unexpected weight change.   Respiratory:  Negative for shortness of breath.    Cardiovascular:  Negative for chest pain.   Gastrointestinal:  Negative for abdominal pain, constipation, diarrhea, nausea and vomiting.   Genitourinary:  Negative for decreased urine volume, difficulty urinating, dysuria, flank pain and hematuria.   Musculoskeletal:  Positive for back pain.   Skin:  Negative for wound.   Neurological:  Negative for headaches.   Psychiatric/Behavioral:  Negative for confusion.      Objective:     Temp:  [96.1 °F (35.6 °C)-98.4 °F (36.9 °C)] 97.7 °F (36.5 °C)  Pulse:  [52-75] 59  Resp:  [15-20] 16  SpO2:  [94 %-98 %] 94 %  BP: (141-190)/(75-98) 141/90     Body mass index is 18.54 kg/m².    Date 12/30/22 0700 - 12/31/22 0659   Shift 7375-7589 5334-2545 7007-8384 24 Hour Total   INTAKE   Shift Total(mL/kg)       OUTPUT   Urine(mL/kg/hr) 550(1.4)   550   Shift Total(mL/kg) 550(11.2)   550(11.2)   Weight (kg) 49 49 49 49          Drains       Drain  Duration             Female External Urinary Catheter 12/29/22 2025 <1 day                    Physical Exam  Vitals reviewed.   Constitutional:       General: She is not in  acute distress.     Appearance: She is not diaphoretic.   HENT:      Head: Normocephalic and atraumatic.      Nose: Nose normal.   Eyes:      Conjunctiva/sclera: Conjunctivae normal.   Cardiovascular:      Rate and Rhythm: Normal rate.   Pulmonary:      Effort: Pulmonary effort is normal. No respiratory distress.   Abdominal:      General: There is no distension.      Tenderness: There is no right CVA tenderness or left CVA tenderness.   Musculoskeletal:         General: Normal range of motion.      Cervical back: Normal range of motion.   Skin:     General: Skin is dry.   Neurological:      Mental Status: She is alert.   Psychiatric:         Behavior: Behavior normal.         Thought Content: Thought content normal.       Significant Labs:    BMP:  Recent Labs   Lab 12/29/22  1729 12/30/22  0341   * 138   K 4.5 3.9    104   CO2 25 25   BUN 11 9*   CREATININE 0.9 1.0   CALCIUM 8.9 9.4       CBC:  Recent Labs   Lab 12/29/22  1729 12/30/22  0341   WBC 8.79 10.16   HGB 15.3 16.5*   HCT 50.5* 53.0*    155       All pertinent labs results from the past 24 hours have been reviewed.    Significant Imaging:  All pertinent imaging results/findings from the past 24 hours have been reviewed.

## 2022-12-31 NOTE — ASSESSMENT & PLAN NOTE
"89 year old female with past medical history of dementia, HTN, and DM who presented to ED with c/o slurred speech and weakness. CTA stroke multiphase negative for acute process, shows multifocal moderate-severe ICAD. MRI brain with acute infarctions in the R MCA territory. Suspect etiology at this time likely ICAD.    Patient was restless and mildly agitated around 2030.  Was given melatonin and had improvement of symptoms.  This morning the patient is easily arousable.  She repeatedly states "I'm fine, nothing is wrong." "I want to go home.  My family will take care of me, they take good care of me."  Continue delirium precautions and nightly melatonin.    Antithrombotics for secondary stroke prevention: Antiplatelets: Clopidogrel: 75 mg daily, continue    Statins for secondary stroke prevention and hyperlipidemia, if present:   Statins: Atorvastatin- 40 mg daily, continue    Aggressive risk factor modification: HTN, HLD, AAD, ? neoplasm     Rehab efforts: The patient has been evaluated by a stroke team provider and the therapy needs have been fully considered based off the presenting complaints and exam findings. The following therapy evaluations are needed: PT evaluate and treat, OT evaluate and treat, SLP evaluate and treat-Recommending SNF, on a mechanical soft nectar thick diet    Diagnostics ordered/pending: None     VTE prophylaxis: Heparin 5000 units SQ every 8 hours  Mechanical prophylaxis: Place SCDs    BP parameters: Infarct: No intervention, SBP <220    "

## 2022-12-31 NOTE — PLAN OF CARE
Recommendations    1. Rec Low Na diet      2. Add Boost Plus TID (chocolate)      3. RD to monitor and follow    Goals: Meet % EEN, EPN by RD f/u  Nutrition Goal Status: new  Communication of RD Recs:  (POC)

## 2022-12-31 NOTE — SUBJECTIVE & OBJECTIVE
Neurologic Chief Complaint: R MCA stroke    Subjective:     Interval History: Patient is seen for follow-up neurological assessment and treatment recommendations: MARCE.  Neuro exam remains stable.  She remains lethargic.  Repeat CTH ordered yesterday was negative for any acute findings.  TTE w/mild aortic stenosis.  EF 65%.  Therapy rec SNF placement.    HPI, Past Medical, Family, and Social History remains the same as documented in the initial encounter.     Review of Systems   Unable to perform ROS: Dementia   Neurological:  Positive for speech difficulty and weakness.   Psychiatric/Behavioral:  Positive for confusion.    Scheduled Meds:   aspirin  81 mg Oral Daily    atorvastatin  40 mg Oral Daily    heparin (porcine)  5,000 Units Subcutaneous Q8H     Continuous Infusions:   sodium chloride 0.9%       PRN Meds:acetaminophen, labetaloL, sodium chloride 0.9%, sodium chloride 0.9%    Objective:     Vital Signs (Most Recent):  Temp: 97.6 °F (36.4 °C) (12/31/22 0815)  Pulse: 60 (12/31/22 0815)  Resp: 18 (12/31/22 0815)  BP: (!) 153/78 (12/31/22 0815)  SpO2: (!) 93 % (12/31/22 0815)  BP Location: Left arm    Vital Signs Range (Last 24H):  Temp:  [97.6 °F (36.4 °C)-98.7 °F (37.1 °C)]   Pulse:  [52-80]   Resp:  [16-18]   BP: (141-165)/(73-90)   SpO2:  [93 %-96 %]   BP Location: Left arm    Physical Exam  Vitals and nursing note reviewed.   HENT:      Right Ear: External ear normal.      Left Ear: External ear normal.      Mouth/Throat:      Mouth: Mucous membranes are dry.   Eyes:      General: No scleral icterus.        Right eye: No discharge.         Left eye: No discharge.      Conjunctiva/sclera: Conjunctivae normal.   Cardiovascular:      Rate and Rhythm: Normal rate and regular rhythm.      Heart sounds: Murmur heard.   Pulmonary:      Effort: Pulmonary effort is normal.      Breath sounds: Normal breath sounds.   Abdominal:      General: Abdomen is flat.      Palpations: Abdomen is soft.   Musculoskeletal:       Cervical back: Normal range of motion and neck supple.      Right lower leg: No edema.      Left lower leg: No edema.   Skin:     General: Skin is warm and dry.   Neurological:      Mental Status: She is easily aroused. She is disoriented.      Motor: Weakness present.   Psychiatric:         Mood and Affect: Mood normal.       Neurological Exam:   LOC: drowsy  Attention Span: poor  Articulation: Dysarthria  Orientation: Not oriented to place, Not oriented to time  Sensation: Intact to light touch, temperature and vibration    Laboratory:  CMP:   Recent Labs   Lab 12/31/22  0519   CALCIUM 9.2   ALBUMIN 3.4*   PROT 6.6      K 4.6   CO2 19*      BUN 14   CREATININE 0.9   ALKPHOS 80   ALT 8*   AST 19   BILITOT 1.1*     CBC:   Recent Labs   Lab 12/31/22  0519   WBC 8.51   RBC 5.93*   HGB 16.3*   HCT 55.1*   *   MCV 93   MCH 27.5   MCHC 29.6*     Coagulation:   Recent Labs   Lab 12/30/22  0341   INR 1.0   APTT 31.7       Diagnostic Results        Brain Imaging     CT 12/30/2022 Impression: CT head appears unchanged from prior.  Areas of acute infarction noted on MRI are not appreciated on routine CT, due to differences in technique.  No evidence of hemorrhagic conversion.  Chronic microvascular ischemic change.  Moderate generalized cerebral volume loss.    MRI brain without contrast 12/29/22  Study is limited by motion artifact.   Scattered small acute infarctions throughout the right cerebral hemisphere, largest within the posterior right parietal lobe.      Vessel Imaging   CTA stroke multiphase 12/29/22  1. CT head shows no acute intracranial abnormalities.   2. No large vessel occlusion.  Short segment of high-grade stenosis of the distal M1 segment right MCA.  There is flow within the M2 segments of the right MCA.  Moderate to high-grade stenosis in the proximal M2 segment superior division left MCA.  Multifocal irregularity of the bilateral anterior, middle, and posterior cerebral arteries as  well as the intracranial vertebral arteries, likely atherosclerotic disease.   3. Calcific atherosclerosis at the bilateral carotid bifurcations with approximately 50% stenosis on the right.  Calcified plaque in the intracranial internal carotid arteries with suspected area of moderate stenosis on the right.   4. Thrombus within the left pulmonary artery.  Eccentric location suggestive of chronic thrombus, although no priors available for comparison.  Consider dedicated CTA PE protocol when clinically stable.   5. Chronic microvascular ischemic change.   6. Several irregular likely endobronchial opacities in the partially visualized right lung as detailed above, likely mucous plugging.   7. Additional findings as above.     Cardiac Imaging     TTE 12/30/2022    Summary  The left ventricle is normal in size with concentric remodeling and normal systolic function.  Normal left ventricular diastolic function.  Normal right ventricular size with normal right ventricular systolic function.  There is mild aortic valve stenosis. AV not seen well in SAX. by hemodynamic assessment this appears to be mild AS.  Aortic valve area is 1.80 cm2; peak velocity is 2.24 m/s; mean gradient is 12 mmHg.  Normal central venous pressure (3 mmHg).  The estimated ejection fraction is 65%.

## 2022-12-31 NOTE — ASSESSMENT & PLAN NOTE
-Noted on CTA chest obtained on 12/31/2022.  -Renal US with hydronephrosis (L >>> R) and non-obstructing nephrolithiasis of R kidney  -Urology consulted, appreciate the consult and recs  -CT Renal stone study obtained and revealed UPJO that Urology feels is likely chronic with no need for intervention at this time.

## 2022-12-31 NOTE — ASSESSMENT & PLAN NOTE
89 yo F admitted with stroke. Urology consulted for left hydronephrosis.     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  - She is asymptomatic from this.  - No intervention necessary for chronic UPJO in this patient.   - Rest of care per primary.

## 2022-12-31 NOTE — ASSESSMENT & PLAN NOTE
89 year old female with past medical history of dementia, HTN, and DM who presented to ED with c/o slurred speech and weakness. CTA stroke multiphase negative for acute process, shows multifocal moderate-severe ICAD. MRI brain with acute infarctions in the R MCA territory. Suspect etiology at this time likely ICAD.    NAEON.  Neuro exam remains stable.  She remains lethargic.  Repeat CTH ordered yesterday was negative for any acute findings.  TTE w/mild aortic stenosis.  EF 65%.  Therapy rec SNF placement.      Antithrombotics for secondary stroke prevention: Antiplatelets: Aspirin: 81 mg daily    Statins for secondary stroke prevention and hyperlipidemia, if present:   Statins: Atorvastatin- 40 mg daily    Aggressive risk factor modification: HTN, HLD, AAD, ? neoplasm     Rehab efforts: The patient has been evaluated by a stroke team provider and the therapy needs have been fully considered based off the presenting complaints and exam findings. The following therapy evaluations are needed: PT evaluate and treat, OT evaluate and treat, SLP evaluate and treat    Diagnostics ordered/pending: TTE to assess cardiac function/status     VTE prophylaxis: Heparin 5000 units SQ every 8 hours  Mechanical prophylaxis: Place SCDs    BP parameters: Infarct: No intervention, SBP <220

## 2022-12-31 NOTE — PROGRESS NOTES
[facility] - Neurosurgery ([facility])  Vascular Neurology  Comprehensive Stroke Center  Progress Note    Assessment/Plan:     * Thrombotic stroke involving right middle cerebral artery  89 year old female with past medical history of dementia, HTN, and DM who presented to ED with c/o slurred speech and weakness. CTA stroke multiphase negative for acute process, shows multifocal moderate-severe ICAD. MRI brain with acute infarctions in the R MCA territory. Suspect etiology at this time likely ICAD.    NAEON.  Neuro exam remains stable.  She remains lethargic.  Repeat CTH ordered yesterday was negative for any acute findings.  TTE w/mild aortic stenosis.  EF 65%.  Therapy rec SNF placement.      Antithrombotics for secondary stroke prevention: Antiplatelets: Changed to plavix 75 mg daily as the patient had been on ASA at home.    Statins for secondary stroke prevention and hyperlipidemia, if present:   Statins: Atorvastatin- 40 mg daily    Aggressive risk factor modification: HTN, HLD, AAD, ? neoplasm     Rehab efforts: The patient has been evaluated by a stroke team provider and the therapy needs have been fully considered based off the presenting complaints and exam findings. The following therapy evaluations are needed: PT evaluate and treat, OT evaluate and treat, SLP evaluate and treat    Diagnostics ordered/pending: TTE to assess cardiac function/status     VTE prophylaxis: Heparin 5000 units SQ every 8 hours  Mechanical prophylaxis: Place SCDs    BP parameters: Infarct: No intervention, SBP <220      Other hydronephrosis  -Noted on CTA chest obtained on 12/31/2022.  -Renal US with hydronephrosis (L >>> R) and non-obstructing nephrolithiasis of R kidney  -Urology consulted, appreciate the consult and recs  -CT Renal stone study obtained and revealed UPJO that Urology feels is likely chronic with no need for intervention at this time.    Mixed hyperlipidemia  Stroke risk factor  , goal <70  Atorvastatin 40mg  daily, continue    Abnormal CT scan, chest  -CTA Chest w/multiple bilatera nodular foci in the upper and lower lobes, the largest in the LLL measuring 2.1 cm.    -Follow up outpatient.  -Mild patchy bilateral ground-glass infiltrates, pneumonitis vs mild edema.  Single view CXR negative for acute findings.  Monitor.    Aneurysm of ascending aorta without rupture  -4.5 cm fusiform aneurysmal dilation of the ascending thoracic aorta noted on CTA Chest (PE study) obtained 12/31/2022.  -Follow up outpatient.    Weakness  Due to stroke  Aggressive PT/OT/SLP  PT/OT recommend SNF.  Placement pending.         12/30/2022 NAEO, neuro exam stable. Patient noted to be more lethargic this morning, repeat CTH ordered. Echo and therapy evals pending to complete stroke workup. Diet advanced to mechanical soft with nectar thick liquids.  12/31/2022 NAEON.  Neuro exam remains stable.  She remains lethargic.  Repeat CTH ordered yesterday was negative for any acute findings.  TTE w/mild aortic stenosis.  EF 65%.  Therapy rec SNF placement.      STROKE DOCUMENTATION   Acute Stroke Times   Last Known Normal Date: 12/29/22  Last Known Normal Time: 2130  Symptom Onset Date: 12/29/22  Unknown Symptom Onset Date: Unknown Date  Symptom Onset Time: 1000  Unknown Symptom Onset Time: Unknown Time  Stroke Team Called Date: 12/29/22  Stroke Team Called Time: 1640  Stroke Team Arrival Date: 12/29/22  Stroke Team Arrival Time: 1645  CT Interpretation Time: 1646  Thrombolytic Therapy Recommended: No  CTA Interpretation Time: 1650  Thrombectomy Recommended: No    NIH Scale:  1a. Level of Consciousness: 2-->Not alert, requires repeated stimulation to attend, or is obtunded and requires strong or painful stimulation to make movements (not stereotyped)  1b. LOC Questions: 2-->Answers neither question correctly  1c. LOC Commands: 1-->Performs one task correctly  2. Best Gaze: 0-->Normal  3. Visual: 0-->No visual loss  4. Facial Palsy: 0-->Normal  symmetrical movements  5a. Motor Arm, Left: 0-->No drift, limb holds 90 (or 45) degrees for full 10 secs  5b. Motor Arm, Right: 0-->No drift, limb holds 90 (or 45) degrees for full 10 secs  6a. Motor Leg, Left: 2-->Some effort against gravity, leg falls to bed by 5 secs, but has some effort against gravity  6b. Motor Leg, Right: 2-->Some effort against gravity, leg falls to bed by 5 secs, but has some effort against gravity  7. Limb Ataxia: 0-->Absent  8. Sensory: 0-->Normal, no sensory loss  9. Best Language: 0-->No aphasia, normal  10. Dysarthria: 0-->Normal  11. Extinction and Inattention (formerly Neglect): 0-->No abnormality  Total (NIH Stroke Scale): 9       Modified Madeline Score: 3  [place] Coma Scale:13   ABCD2 Score:    [id number] Score:   HAS -BLED Score:   ICH Score:   Hunt & Katz Classification:      Hemorrhagic change of an Ischemic Stroke: Does this patient have an ischemic stroke with hemorrhagic changes? No     Neurologic Chief Complaint: R MCA stroke    Subjective:     Interval History: Patient is seen for follow-up neurological assessment and treatment recommendations: MARCE.  Neuro exam remains stable.  She remains lethargic.  Repeat CTH ordered yesterday was negative for any acute findings.  TTE w/mild aortic stenosis.  EF 65%.  Therapy rec SNF placement.    HPI, Past Medical, Family, and Social History remains the same as documented in the initial encounter.     Review of Systems   Unable to perform ROS: Dementia   Neurological:  Positive for speech difficulty and weakness.   Psychiatric/Behavioral:  Positive for confusion.    Scheduled Meds:   aspirin  81 mg Oral Daily    atorvastatin  40 mg Oral Daily    heparin (porcine)  5,000 Units Subcutaneous Q8H     Continuous Infusions:   sodium chloride 0.9%       PRN Meds:acetaminophen, labetaloL, sodium chloride 0.9%, sodium chloride 0.9%    Objective:     Vital Signs (Most Recent):  Temp: 97.6 °F (36.4 °C) (12/31/22 0815)  Pulse: 60 (12/31/22  0815)  Resp: 18 (12/31/22 0815)  BP: (!) 153/78 (12/31/22 0815)  SpO2: (!) 93 % (12/31/22 0815)  BP Location: Left arm    Vital Signs Range (Last 24H):  Temp:  [97.6 °F (36.4 °C)-98.7 °F (37.1 °C)]   Pulse:  [52-80]   Resp:  [16-18]   BP: (141-165)/(73-90)   SpO2:  [93 %-96 %]   BP Location: Left arm    Physical Exam  Vitals and nursing note reviewed.   HENT:      Right Ear: External ear normal.      Left Ear: External ear normal.      Mouth/Throat:      Mouth: Mucous membranes are dry.   Eyes:      General: No scleral icterus.        Right eye: No discharge.         Left eye: No discharge.      Conjunctiva/sclera: Conjunctivae normal.   Cardiovascular:      Rate and Rhythm: Normal rate and regular rhythm.      Heart sounds: Murmur heard.   Pulmonary:      Effort: Pulmonary effort is normal.      Breath sounds: Normal breath sounds.   Abdominal:      General: Abdomen is flat.      Palpations: Abdomen is soft.   Musculoskeletal:      Cervical back: Normal range of motion and neck supple.      Right lower leg: No edema.      Left lower leg: No edema.   Skin:     General: Skin is warm and dry.   Neurological:      Mental Status: She is easily aroused. She is disoriented.      Motor: Weakness present.   Psychiatric:         Mood and Affect: Mood normal.       Neurological Exam:   LOC: drowsy  Attention Span: poor  Articulation: Dysarthria  Orientation: Not oriented to place, Not oriented to time  Sensation: Intact to light touch, temperature and vibration    Laboratory:  CMP:   Recent Labs   Lab 12/31/22  0519   CALCIUM 9.2   ALBUMIN 3.4*   PROT 6.6      K 4.6   CO2 19*      BUN 14   CREATININE 0.9   ALKPHOS 80   ALT 8*   AST 19   BILITOT 1.1*     CBC:   Recent Labs   Lab 12/31/22 0519   WBC 8.51   RBC 5.93*   HGB 16.3*   HCT 55.1*   *   MCV 93   MCH 27.5   MCHC 29.6*     Coagulation:   Recent Labs   Lab 12/30/22  0341   INR 1.0   APTT 31.7       Diagnostic Results        Brain Imaging     CT  12/30/2022 Impression: CT head appears unchanged from prior.  Areas of acute infarction noted on MRI are not appreciated on routine CT, due to differences in technique.  No evidence of hemorrhagic conversion.  Chronic microvascular ischemic change.  Moderate generalized cerebral volume loss.    MRI brain without contrast 12/29/22  Study is limited by motion artifact.   Scattered small acute infarctions throughout the right cerebral hemisphere, largest within the posterior right parietal lobe.      Vessel Imaging   CTA stroke multiphase 12/29/22  1. CT head shows no acute intracranial abnormalities.   2. No large vessel occlusion.  Short segment of high-grade stenosis of the distal M1 segment right MCA.  There is flow within the M2 segments of the right MCA.  Moderate to high-grade stenosis in the proximal M2 segment superior division left MCA.  Multifocal irregularity of the bilateral anterior, middle, and posterior cerebral arteries as well as the intracranial vertebral arteries, likely atherosclerotic disease.   3. Calcific atherosclerosis at the bilateral carotid bifurcations with approximately 50% stenosis on the right.  Calcified plaque in the intracranial internal carotid arteries with suspected area of moderate stenosis on the right.   4. Thrombus within the left pulmonary artery.  Eccentric location suggestive of chronic thrombus, although no priors available for comparison.  Consider dedicated CTA PE protocol when clinically stable.   5. Chronic microvascular ischemic change.   6. Several irregular likely endobronchial opacities in the partially visualized right lung as detailed above, likely mucous plugging.   7. Additional findings as above.     Cardiac Imaging     TTE 12/30/2022    Summary  The left ventricle is normal in size with concentric remodeling and normal systolic function.  Normal left ventricular diastolic function.  Normal right ventricular size with normal right ventricular systolic  function.  There is mild aortic valve stenosis. AV not seen well in SAX. by hemodynamic assessment this appears to be mild AS.  Aortic valve area is 1.80 cm2; peak velocity is 2.24 m/s; mean gradient is 12 mmHg.  Normal central venous pressure (3 mmHg).  The estimated ejection fraction is 65%.      Silvia Perdomo DNP, NP  [facility] Stroke [place]  Department of Vascular Neurology   [facility] Neuro[facility] ([facility])    This medical record was prepared using voice recognition software and may contain phonetic and/or or grammatical errors.  Garbled syntax, mangled pronounciations, and other bizarre constructions may be attributed to that system.

## 2022-12-31 NOTE — ASSESSMENT & PLAN NOTE
Nutrition Problem:  Moderate Protein-Calorie Malnutrition  Malnutrition in the context of Chronic Illness/Injury    Related to (etiology):  Inadequate energy intake    Signs and Symptoms (as evidenced by):  Body Fat Depletion: mild depletion of orbitals   Muscle Mass Depletion: mild depletion of temples and clavicle region   Weight Loss: 10% x 6 months     Interventions(treatment strategy):  Collaboration with other providers    Nutrition Diagnosis Status:  New

## 2022-12-31 NOTE — CONSULTS
[facility] - Neurosurgery ([facility])  Adult Nutrition  Consult Note    SUMMARY     Recommendations    1. Rec Low Na diet      2. Add Boost Plus TID (chocolate)      3. RD to monitor and follow    Goals: Meet % EEN, EPN by RD f/u  Nutrition Goal Status: new  Communication of RD Recs:  (POC)    Assessment and Plan    Nutrition Problem:  Moderate Protein-Calorie Malnutrition  Malnutrition in the context of Chronic Illness/Injury    Related to (etiology):  Inadequate energy intake    Signs and Symptoms (as evidenced by):  Body Fat Depletion: mild depletion of orbitals   Muscle Mass Depletion: mild depletion of temples and clavicle region   Weight Loss: 10% x 6 months     Interventions(treatment strategy):  Collaboration with other providers    Nutrition Diagnosis Status:  New      Malnutrition Assessment  Weight Loss (Malnutrition):  (10 % x 6 months)   Orbital Region (Subcutaneous Fat Loss): mild depletion   [place] Region (Muscle Loss): mild depletion  Clavicle Bone Region (Muscle Loss): mild depletion  Clavicle and Acromion Bone Region (Muscle Loss): mild depletion  Dorsal Hand (Muscle Loss): mild depletion     Reason for Assessment    Reason For Assessment: consult  Diagnosis: stroke/CVA  Relevant Medical History: HLD  Interdisciplinary Rounds: did not attend    General Information Comments:   89 year old female with past medical history of dementia, HTN, and DM who presented to ED with c/o slurred speech and weakness. RD consulted for stroke pathway. Speak with family at bedside. Family reports pt with good appetite PTA, usually eat 3 meals per day. Denies N/V. Tolerating soft diet. Stated  lb.  lb. Per wt hx, noted wt loss of 12 lb (10%) x 6 months (significant). Visual NFPE completed, pt with mild fat and muscle depletion. Pt meet criteria for moderate malnutrition in the context of chronic illness.     Nutrition Discharge Planning: Stroke nutrition therapy provided on 12/31detailing how to  "limit salt and fat intake. Emphasized the importance of diet adherence. Pt verbalized understanding. All questions/concerns addressed. No further needs identified.    Nutrition Risk Screen    Nutrition Risk Screen: no indicators present    Nutrition/Diet History    Spiritual, Cultural Beliefs, Advent Practices, Values that Affect Care: no    Anthropometrics    Temp: 96.6 °F (35.9 °C)  Height Method: Stated  Height: 5' 4" (162.6 cm)  Height (inches): 64 in  Weight Method: Stated  Weight: 49 kg (108 lb)  Weight (lb): 108 lb  Ideal Body Weight (IBW), Female: 120 lb  % Ideal Body Weight, Female (lb): 90 %  BMI (Calculated): 18.5     Lab/Procedures/Meds    Pertinent Labs Reviewed: reviewed  Pertinent Labs Comments: Tbili 1.1, eGFR 57.8  Pertinent Medications Reviewed: reviewed  Pertinent Medications Comments: heparin, sotrvastatin    Estimated/Assessed Needs    Weight Used For Calorie Calculations: 49 kg (108 lb)  Energy Calorie Requirements (kcal): 6484-7679 kcal  Energy Need Method: Kcal/kg (25-30)  Protein Requirements: 59-73g (1.2-1.5g/kg)  Weight Used For Protein Calculations: 49 kg (108 lb)  Fluid Requirements (mL): 1ml/kcal or per MD  Estimated Fluid Requirement Method: RDA Method  RDA Method (mL): 1224     Nutrition Prescription Ordered    Current Diet Order: Dysphagia soft    Evaluation of Received Nutrient/Fluid Intake    I/O: -0.7 L since admit  Energy Calories Required: not meeting needs  Protein Required: not meeting needs  Comments: LBM 12/31  Tolerance: tolerating    Nutrition Risk    Level of Risk/Frequency of Follow-up:  (1x/week)       Monitor and Evaluation    Food and Nutrient Intake: energy intake, food and beverage intake  Food and Nutrient Adminstration: diet order  Knowledge/Beliefs/Attitudes: food and nutrition knowledge/skill  Physical Activity and Function: nutrition-related ADLs and IADLs  Anthropometric Measurements: height/length, weight, weight change, body mass index  Biochemical Data, " Medical Tests and Procedures: electrolyte and renal panel, gastrointestinal profile, glucose/endocrine profile, inflammatory profile, lipid profile  Nutrition-Focused Physical Findings: overall appearance     Nutrition Follow-Up    RD Follow-up?: Yes     show

## 2022-12-31 NOTE — PLAN OF CARE
Problem: Fall Injury Risk  Goal: Absence of Fall and Fall-Related Injury  Outcome: Ongoing, Progressing  Intervention: Promote Injury-Free Environment  Flowsheets (Taken 12/31/2022 0419)  Safety Promotion/Fall Prevention:   assistive device/personal item within reach   bed alarm set   Fall Risk reviewed with patient/family   medications reviewed   side rails raised x 2   instructed to call staff for mobility     Problem: Adult Inpatient Plan of Care  Goal: Plan of Care Review  Outcome: Ongoing, Progressing     Problem: Cognitive Impairment (Stroke, Ischemic/Transient Ischemic Attack)  Goal: Optimal Cognitive Function  Outcome: Ongoing, Not Progressing  Intervention: Optimize Cognitive Function  Flowsheets (Taken 12/31/2022 0419)  Environment Familiarity/Consistency: daily routine followed  Reorientation Measures:   clock in view   reorientation provided  Sensory Stimulation Regulation:   quiet environment promoted   care clustered     POC reviewed this shift.  Pt more alert this shift but remains oriented only to self.  Dysarthria continues.  Incontinent of b/b.  Olena-care provided per staff.  VSS.  See flowsheet for full assessment.  No s/s of distress noted.  Fall/safety precautions maintained.

## 2022-12-31 NOTE — CONSULTS
[facility] - Neurosurgery ([facility])  Urology  Consult Note    Patient Name: Franny Arnold  MRN: [id number]  Admission Date: 12/29/2022  Hospital Length of Stay: 1   Code Status: Full Code   Attending Provider: Bry Cortés DO   Consulting Provider: Chen Eisenberg MD  Primary Care Physician: Dinesh Wylie MD  Principal Problem:Thrombotic stroke involving right middle cerebral artery    Inpatient consult to Urology  Consult performed by: Chen Eisenberg MD  Consult ordered by: FRIDA Sanchez          Subjective:     HPI:  89 yo F with PMH of HTN, DM, Dementia, who presented to the ED due to slurred speech and weakness and is admitted for stroke. CTA stroke multiphase negative for acute process, shows multifocal moderate-severe ICAD. MRI brain with acute infarctions in the R MCA territory. Urology consulted for hydronephrosis. Patient is accompanied by her niece during the visit. She reports occasional non-specific back pain. She also reports a UTI this past Summer but she rarely gets UTI's. Patient denies fevers, chills, nausea, vomiting, flank pain, hematuria, dysuria, difficulty voiding.     CT RSS obtained today shows moderate to severe hydronephrosis on the left with abrupt transition point at the left UPJ. There is no hydroureteronephrosis, although the ureters are difficult to follow. On the right she has an extrarenal pelvis and no hydroureter. There are no visualized ureteral stones. Bladder filled with contrast. WBC 10. Hgb 16.5. Cr 1.0 (baseline 0.8-1.0). Clean catch UA nitrite negative, 2 RBC, 12 WBC, rare bacteria. Urine culture in process.       Past Medical History:   Diagnosis Date    Basal cell carcinoma     Dementia        Past Surgical History:   Procedure Laterality Date    APPENDECTOMY      INSERTION OF INTRAMEDULLARY ZAYNAB Left 1/2/2020    Procedure: INSERTION, INTRAMEDULLARY ZAYNAB;  Surgeon: Adithya Addison MD;  Location: [facility] OR [address] [place] [address];  Service: Orthopedics;   Laterality: Left;    TONSILLECTOMY         Review of patient's allergies indicates:  No Known Allergies    Family History       Family history is unknown by patient.            Tobacco Use    Smoking status: Never    Smokeless tobacco: Never   Substance and Sexual Activity    Alcohol use: Not Currently    Drug use: Never    Sexual activity: Not Currently       Review of Systems   Constitutional:  Negative for activity change, appetite change, chills, fever and unexpected weight change.   Respiratory:  Negative for shortness of breath.    Cardiovascular:  Negative for chest pain.   Gastrointestinal:  Negative for abdominal pain, constipation, diarrhea, nausea and vomiting.   Genitourinary:  Negative for decreased urine volume, difficulty urinating, dysuria, flank pain and hematuria.   Musculoskeletal:  Positive for back pain.   Skin:  Negative for wound.   Neurological:  Negative for headaches.   Psychiatric/Behavioral:  Negative for confusion.      Objective:     Temp:  [96.1 °F (35.6 °C)-98.4 °F (36.9 °C)] 97.7 °F (36.5 °C)  Pulse:  [52-75] 59  Resp:  [15-20] 16  SpO2:  [94 %-98 %] 94 %  BP: (141-190)/(75-98) 141/90     Body mass index is 18.54 kg/m².    Date 12/30/22 0700 - 12/31/22 0659   Shift 2329-9392 1153-4813 4906-8709 24 Hour Total   INTAKE   Shift Total(mL/kg)       OUTPUT   Urine(mL/kg/hr) 550(1.4)   550   Shift Total(mL/kg) 550(11.2)   550(11.2)   Weight (kg) 49 49 49 49          Drains       Drain  Duration             Female External Urinary Catheter 12/29/22 2025 <1 day                    Physical Exam  Vitals reviewed.   Constitutional:       General: She is not in acute distress.     Appearance: She is not diaphoretic.   HENT:      Head: Normocephalic and atraumatic.      Nose: Nose normal.   Eyes:      Conjunctiva/sclera: Conjunctivae normal.   Cardiovascular:      Rate and Rhythm: Normal rate.   Pulmonary:      Effort: Pulmonary effort is normal. No respiratory distress.   Abdominal:       General: There is no distension.      Tenderness: There is no right CVA tenderness or left CVA tenderness.   Musculoskeletal:         General: Normal range of motion.      Cervical back: Normal range of motion.   Skin:     General: Skin is dry.   Neurological:      Mental Status: She is alert.   Psychiatric:         Behavior: Behavior normal.         Thought Content: Thought content normal.       Significant Labs:    BMP:  Recent Labs   Lab 12/29/22  1729 12/30/22  0341   * 138   K 4.5 3.9    104   CO2 25 25   BUN 11 9*   CREATININE 0.9 1.0   CALCIUM 8.9 9.4       CBC:  Recent Labs   Lab 12/29/22  1729 12/30/22  0341   WBC 8.79 10.16   HGB 15.3 16.5*   HCT 50.5* 53.0*    155       All pertinent labs results from the past 24 hours have been reviewed.    Significant Imaging:  All pertinent imaging results/findings from the past 24 hours have been reviewed.                      Assessment and Plan:     Other hydronephrosis  89 yo F admitted with stroke. Urology consulted for left hydronephrosis.     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  - She is asymptomatic from this.  - No intervention necessary for chronic UPJO in this patient.   - Rest of care per primary.        VTE Risk Mitigation (From admission, onward)         Ordered     heparin (porcine) injection 5,000 Units  Every 8 hours         12/29/22 2146     IP VTE HIGH RISK PATIENT  Once         12/29/22 2146     Place sequential compression device  Until discontinued         12/29/22 2146                Thank you for your consult. I will sign off. Please contact us if you have any additional questions.    Chen Eisenberg MD  Urology  [facility] - Neurosurgery ([facility])

## 2022-12-31 NOTE — ASSESSMENT & PLAN NOTE
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  -Follow up outpatient.

## 2023-01-01 PROCEDURE — 25000003 PHARM REV CODE 250: Performed by: NURSE PRACTITIONER

## 2023-01-01 PROCEDURE — 99233 PR SUBSEQUENT HOSPITAL CARE,LEVL III: ICD-10-PCS | Mod: ,,, | Performed by: PSYCHIATRY & NEUROLOGY

## 2023-01-01 PROCEDURE — 11000001 HC ACUTE MED/SURG PRIVATE ROOM

## 2023-01-01 PROCEDURE — 94761 N-INVAS EAR/PLS OXIMETRY MLT: CPT

## 2023-01-01 PROCEDURE — 25000003 PHARM REV CODE 250: Performed by: PHYSICIAN ASSISTANT

## 2023-01-01 PROCEDURE — 63600175 PHARM REV CODE 636 W HCPCS: Performed by: NURSE PRACTITIONER

## 2023-01-01 PROCEDURE — 99233 SBSQ HOSP IP/OBS HIGH 50: CPT | Mod: ,,, | Performed by: PSYCHIATRY & NEUROLOGY

## 2023-01-01 RX ADMIN — HEPARIN SODIUM 5000 UNITS: 5000 INJECTION INTRAVENOUS; SUBCUTANEOUS at 05:01

## 2023-01-01 RX ADMIN — HEPARIN SODIUM 5000 UNITS: 5000 INJECTION INTRAVENOUS; SUBCUTANEOUS at 09:01

## 2023-01-01 RX ADMIN — HEPARIN SODIUM 5000 UNITS: 5000 INJECTION INTRAVENOUS; SUBCUTANEOUS at 02:01

## 2023-01-01 RX ADMIN — Medication 6 MG: at 09:01

## 2023-01-01 RX ADMIN — CLOPIDOGREL BISULFATE 75 MG: 75 TABLET ORAL at 09:01

## 2023-01-01 RX ADMIN — ATORVASTATIN CALCIUM 40 MG: 20 TABLET, FILM COATED ORAL at 09:01

## 2023-01-01 NOTE — SUBJECTIVE & OBJECTIVE
"Neurologic Chief Complaint: R MCA stroke    Subjective:     Interval History: Patient is seen for follow-up neurological assessment and treatment recommendations: Patient was restless and mildly agitated around 2030.  Was given melatonin and had improvement of symptoms.  This morning the patient is easily arousable.  She repeatedly states "I'm fine, nothing is wrong." "I want to go home.  My family will take care of me, they take good care of me." Continue delirium precautions and nightly melatonin.     HPI, Past Medical, Family, and Social History remains the same as documented in the initial encounter.     Review of Systems   Unable to perform ROS: Dementia   Neurological:  Positive for speech difficulty and weakness.   Psychiatric/Behavioral:  Positive for confusion.    Scheduled Meds:   atorvastatin  40 mg Oral Daily    clopidogreL  75 mg Oral Daily    heparin (porcine)  5,000 Units Subcutaneous Q8H    melatonin  6 mg Oral Nightly     Continuous Infusions:   sodium chloride 0.9%       PRN Meds:acetaminophen, labetaloL, sodium chloride 0.9%, sodium chloride 0.9%    Objective:     Vital Signs (Most Recent):  Temp: 98.1 °F (36.7 °C) (01/01/23 0609)  Pulse: 68 (01/01/23 0609)  Resp: 20 (01/01/23 0609)  BP: (!) 141/91 (01/01/23 0609)  SpO2: (!) 94 % (01/01/23 0609)  BP Location: Right arm    Vital Signs Range (Last 24H):  Temp:  [96.6 °F (35.9 °C)-98.1 °F (36.7 °C)]   Pulse:  []   Resp:  [18-20]   BP: (135-153)/(67-91)   SpO2:  [93 %-96 %]   BP Location: Right arm    Physical Exam  Vitals and nursing note reviewed.   Constitutional:       General: She is not in acute distress.     Appearance: She is well-developed. She is not diaphoretic.   HENT:      Head: Normocephalic and atraumatic.      Right Ear: External ear normal.      Left Ear: External ear normal.      Nose: Nose normal.      Mouth/Throat:      Mouth: Mucous membranes are moist.   Eyes:      General: No scleral icterus.        Right eye: No " discharge.         Left eye: No discharge.      Extraocular Movements: Extraocular movements intact.      Conjunctiva/sclera: Conjunctivae normal.   Cardiovascular:      Rate and Rhythm: Normal rate and regular rhythm.   Pulmonary:      Effort: Pulmonary effort is normal. No respiratory distress.   Abdominal:      General: There is no distension.      Palpations: Abdomen is soft.      Tenderness: There is no abdominal tenderness.   Musculoskeletal:      Cervical back: Normal range of motion and neck supple.      Right lower leg: No edema.      Left lower leg: No edema.   Skin:     General: Skin is warm and dry.      Capillary Refill: Capillary refill takes less than 2 seconds.   Neurological:      Mental Status: She is easily aroused. She is lethargic and disoriented.      Motor: Weakness present.       Neurological Exam:   LOC: drowsy  Attention Span: poor  Language: Expressive aphasia  Articulation: Dysarthria  Orientation: Not oriented to place, Not oriented to time  Sensation: Intact to light touch    Laboratory:  CMP: No results for input(s): GLUCOSE, CALCIUM, ALBUMIN, PROT, NA, K, CO2, CL, BUN, CREATININE, ALKPHOS, ALT, AST, BILITOT in the last 24 hours.  CBC:   Recent Labs   Lab 12/31/22  0519   WBC 8.51   RBC 5.93*   HGB 16.3*   HCT 55.1*   *   MCV 93   MCH 27.5   MCHC 29.6*     Coagulation:   Recent Labs   Lab 12/30/22  0341   INR 1.0   APTT 31.7       Diagnostic Results     Brain Imaging      CT 12/30/2022 Impression: CT head appears unchanged from prior.  Areas of acute infarction noted on MRI are not appreciated on routine CT, due to differences in technique.  No evidence of hemorrhagic conversion.  Chronic microvascular ischemic change.  Moderate generalized cerebral volume loss.     MRI brain without contrast 12/29/22  Study is limited by motion artifact.   Scattered small acute infarctions throughout the right cerebral hemisphere, largest within the posterior right parietal lobe.      Vessel  Imaging   CTA stroke multiphase 12/29/22  1. CT head shows no acute intracranial abnormalities.   2. No large vessel occlusion.  Short segment of high-grade stenosis of the distal M1 segment right MCA.  There is flow within the M2 segments of the right MCA.  Moderate to high-grade stenosis in the proximal M2 segment superior division left MCA.  Multifocal irregularity of the bilateral anterior, middle, and posterior cerebral arteries as well as the intracranial vertebral arteries, likely atherosclerotic disease.   3. Calcific atherosclerosis at the bilateral carotid bifurcations with approximately 50% stenosis on the right.  Calcified plaque in the intracranial internal carotid arteries with suspected area of moderate stenosis on the right.   4. Thrombus within the left pulmonary artery.  Eccentric location suggestive of chronic thrombus, although no priors available for comparison.  Consider dedicated CTA PE protocol when clinically stable.   5. Chronic microvascular ischemic change.   6. Several irregular likely endobronchial opacities in the partially visualized right lung as detailed above, likely mucous plugging.   7. Additional findings as above.      Cardiac Imaging      TTE 12/30/2022     Summary  The left ventricle is normal in size with concentric remodeling and normal systolic function.  Normal left ventricular diastolic function.  Normal right ventricular size with normal right ventricular systolic function.  There is mild aortic valve stenosis. AV not seen well in SAX. by hemodynamic assessment this appears to be mild AS.  Aortic valve area is 1.80 cm2; peak velocity is 2.24 m/s; mean gradient is 12 mmHg.  Normal central venous pressure (3 mmHg).  The estimated ejection fraction is 65%.

## 2023-01-01 NOTE — PLAN OF CARE
Problem: Adjustment to Illness (Stroke, Ischemic/Transient Ischemic Attack)  Goal: Optimal Coping  Outcome: Ongoing, Progressing     Problem: Cognitive Impairment (Stroke, Ischemic/Transient Ischemic Attack)  Goal: Optimal Cognitive Function  Outcome: Ongoing, Progressing     Problem: Communication Impairment (Stroke, Ischemic/Transient Ischemic Attack)  Goal: Improved Communication Skills  Outcome: Ongoing, Progressing     Problem: Fall Injury Risk  Goal: Absence of Fall and Fall-Related Injury  Outcome: Ongoing, Progressing       POC and stroke booklet reviewed with patient at the beside. No evidence of learning noted related to patients continued confusion. Stroke booklet remains at the bedside. Cardiac monitoring ongoing. Vital signs all shift as ordered. Patient noted to have increased restlessness, with confusion and unable to redirect, see previous nurses note. Melatonin ordered per on call provider for behaviors. Melatonin noted to be effective. Precautions maintained. Bed low and locked with call light within reach and bed alarm activated. Tele sitter remains at the beside. POC ongoing.

## 2023-01-01 NOTE — NURSING
Patient noted to have increased confusion with restlessness, patient noted to be removing hospital gown, SCDs, telemetry and continues to ask to be taken down the street to her home, unable to redirect or reorient patient, on call for stroke notified, this writer was informed that the on call would review patient chart.

## 2023-01-01 NOTE — PROGRESS NOTES
"Amol Colon - Neurosurgery ([facility])  Vascular Neurology  Comprehensive Stroke Center  Progress Note    Assessment/Plan:     * Thrombotic stroke involving right middle cerebral artery  89 year old female with past medical history of dementia, HTN, and DM who presented to ED with c/o slurred speech and weakness. CTA stroke multiphase negative for acute process, shows multifocal moderate-severe ICAD. MRI brain with acute infarctions in the R MCA territory. Suspect etiology at this time likely ICAD.    Patient was restless and mildly agitated around 2030.  Was given melatonin and had improvement of symptoms.  This morning the patient is easily arousable.  She repeatedly states "I'm fine, nothing is wrong." "I want to go home.  My family will take care of me, they take good care of me."  Continue delirium precautions and nightly melatonin.    Antithrombotics for secondary stroke prevention: Antiplatelets: Clopidogrel: 75 mg daily, continue    Statins for secondary stroke prevention and hyperlipidemia, if present:   Statins: Atorvastatin- 40 mg daily, continue    Aggressive risk factor modification: HTN, HLD, AAD, ? neoplasm     Rehab efforts: The patient has been evaluated by a stroke team provider and the therapy needs have been fully considered based off the presenting complaints and exam findings. The following therapy evaluations are needed: PT evaluate and treat, OT evaluate and treat, SLP evaluate and treat-Recommending SNF, on a mechanical soft nectar thick diet    Diagnostics ordered/pending: None     VTE prophylaxis: Heparin 5000 units SQ every 8 hours  Mechanical prophylaxis: Place SCDs    BP parameters: Infarct: No intervention, SBP <220      Moderate malnutrition  -Seen by Dietician.  Boost w/meals.    Other hydronephrosis  -Noted on CTA chest obtained on 12/31/2022.  -Renal US with hydronephrosis (L >>> R) and non-obstructing nephrolithiasis of R kidney  -Urology consulted, appreciate the consult and " "recs  -CT Renal stone study obtained and revealed UPJO that Urology feels is likely chronic with no need for intervention at this time.    Mixed hyperlipidemia  Stroke risk factor  , goal <70  Atorvastatin 40mg daily, continue    Abnormal CT scan, chest  -CTA Chest w/multiple bilatera nodular foci in the upper and lower lobes, the largest in the LLL measuring 2.1 cm.    -Follow up outpatient.  -Mild patchy bilateral ground-glass infiltrates, pneumonitis vs mild edema.  Single view CXR negative for acute findings.  Monitor.    Aneurysm of ascending aorta without rupture  -4.5 cm fusiform aneurysmal dilation of the ascending thoracic aorta noted on CTA Chest (PE study) obtained 12/31/2022.  -Follow up outpatient.    Weakness  Due to stroke  Aggressive PT/OT/SLP  PT/OT recommend SNF.  Placement pending.  The patient is stating this am she wants to go home.         12/30/2022 NAEO, neuro exam stable. Patient noted to be more lethargic this morning, repeat CTH ordered. Echo and therapy evals pending to complete stroke workup. Diet advanced to mechanical soft with nectar thick liquids.  12/31/2022 NAEON.  Neuro exam remains stable.  She remains lethargic.  Repeat CTH ordered yesterday was negative for any acute findings.  TTE w/mild aortic stenosis.  EF 65%.  Therapy rec SNF placement.  1/1/2023 Patient was restless and mildly agitated around 2030.  Was given melatonin and had improvement of symptoms.  This morning the patient is easily arousable.  She repeatedly states "I'm fine, nothing is wrong." "I want to go home.  My family will take care of me, they take good care of me."      STROKE DOCUMENTATION   Acute Stroke Times   Last Known Normal Date: 12/29/22  Last Known Normal Time: 2130  Symptom Onset Date: 12/29/22  Unknown Symptom Onset Date: Unknown Date  Symptom Onset Time: 1000  Unknown Symptom Onset Time: Unknown Time  Stroke Team Called Date: 12/29/22  Stroke Team Called Time: 1640  Stroke Team Arrival " "Date: 12/29/22  Stroke Team Arrival Time: 1645  CT Interpretation Time: 1646  Thrombolytic Therapy Recommended: No  CTA Interpretation Time: 1650  Thrombectomy Recommended: No    NIH Scale:  1a. Level of Consciousness: 2-->Not alert, requires repeated stimulation to attend, or is obtunded and requires strong or painful stimulation to make movements (not stereotyped)  1b. LOC Questions: 2-->Answers neither question correctly  1c. LOC Commands: 1-->Performs one task correctly  2. Best Gaze: 0-->Normal  3. Visual: 0-->No visual loss  4. Facial Palsy: 0-->Normal symmetrical movements  5a. Motor Arm, Left: 0-->No drift, limb holds 90 (or 45) degrees for full 10 secs  5b. Motor Arm, Right: 0-->No drift, limb holds 90 (or 45) degrees for full 10 secs  6a. Motor Leg, Left: 2-->Some effort against gravity, leg falls to bed by 5 secs, but has some effort against gravity  6b. Motor Leg, Right: 2-->Some effort against gravity, leg falls to bed by 5 secs, but has some effort against gravity  7. Limb Ataxia: 0-->Absent  8. Sensory: 0-->Normal, no sensory loss  9. Best Language: 0-->No aphasia, normal  10. Dysarthria: 0-->Normal  11. Extinction and Inattention (formerly Neglect): 0-->No abnormality  Total (NIH Stroke Scale): 9       Modified [place] Score: 3  Seun Coma Scale:13   ABCD2 Score:    [id number] Score:   HAS -BLED Score:   ICH Score:   Hunt & Katz Classification:      Hemorrhagic change of an Ischemic Stroke: Does this patient have an ischemic stroke with hemorrhagic changes? No     Neurologic Chief Complaint: R MCA stroke    Subjective:     Interval History: Patient is seen for follow-up neurological assessment and treatment recommendations: Patient was restless and mildly agitated around 2030.  Was given melatonin and had improvement of symptoms.  This morning the patient is easily arousable.  She repeatedly states "I'm fine, nothing is wrong." "I want to go home.  My family will take care of me, they take good " "care of me." Continue delirium precautions and nightly melatonin.     HPI, Past Medical, Family, and Social History remains the same as documented in the initial encounter.     Review of Systems   Unable to perform ROS: Dementia   Neurological:  Positive for speech difficulty and weakness.   Psychiatric/Behavioral:  Positive for confusion.    Scheduled Meds:   atorvastatin  40 mg Oral Daily    clopidogreL  75 mg Oral Daily    heparin (porcine)  5,000 Units Subcutaneous Q8H    melatonin  6 mg Oral Nightly     Continuous Infusions:   sodium chloride 0.9%       PRN Meds:acetaminophen, labetaloL, sodium chloride 0.9%, sodium chloride 0.9%    Objective:     Vital Signs (Most Recent):  Temp: 98.1 °F (36.7 °C) (01/01/23 0609)  Pulse: 68 (01/01/23 0609)  Resp: 20 (01/01/23 0609)  BP: (!) 141/91 (01/01/23 0609)  SpO2: (!) 94 % (01/01/23 0609)  BP Location: Right arm    Vital Signs Range (Last 24H):  Temp:  [96.6 °F (35.9 °C)-98.1 °F (36.7 °C)]   Pulse:  []   Resp:  [18-20]   BP: (135-153)/(67-91)   SpO2:  [93 %-96 %]   BP Location: Right arm    Physical Exam  Vitals and nursing note reviewed.   Constitutional:       General: She is not in acute distress.     Appearance: She is well-developed. She is not diaphoretic.   HENT:      Head: Normocephalic and atraumatic.      Right Ear: External ear normal.      Left Ear: External ear normal.      Nose: Nose normal.      Mouth/Throat:      Mouth: Mucous membranes are moist.   Eyes:      General: No scleral icterus.        Right eye: No discharge.         Left eye: No discharge.      Extraocular Movements: Extraocular movements intact.      Conjunctiva/sclera: Conjunctivae normal.   Cardiovascular:      Rate and Rhythm: Normal rate and regular rhythm.   Pulmonary:      Effort: Pulmonary effort is normal. No respiratory distress.   Abdominal:      General: There is no distension.      Palpations: Abdomen is soft.      Tenderness: There is no abdominal tenderness. "   Musculoskeletal:      Cervical back: Normal range of motion and neck supple.      Right lower leg: No edema.      Left lower leg: No edema.   Skin:     General: Skin is warm and dry.      Capillary Refill: Capillary refill takes less than 2 seconds.   Neurological:      Mental Status: She is easily aroused. She is lethargic and disoriented.      Motor: Weakness present.       Neurological Exam:   LOC: drowsy  Attention Span: poor  Language: Expressive aphasia  Articulation: Dysarthria  Orientation: Not oriented to place, Not oriented to time  Sensation: Intact to light touch    Laboratory:  CMP: No results for input(s): GLUCOSE, CALCIUM, ALBUMIN, PROT, NA, K, CO2, CL, BUN, CREATININE, ALKPHOS, ALT, AST, BILITOT in the last 24 hours.  CBC:   Recent Labs   Lab 12/31/22  0519   WBC 8.51   RBC 5.93*   HGB 16.3*   HCT 55.1*   *   MCV 93   MCH 27.5   MCHC 29.6*     Coagulation:   Recent Labs   Lab 12/30/22  0341   INR 1.0   APTT 31.7       Diagnostic Results     Brain Imaging      CTH 12/30/2022 Impression: CT head appears unchanged from prior.  Areas of acute infarction noted on MRI are not appreciated on routine CT, due to differences in technique.  No evidence of hemorrhagic conversion.  Chronic microvascular ischemic change.  Moderate generalized cerebral volume loss.     MRI brain without contrast 12/29/22  Study is limited by motion artifact.   Scattered small acute infarctions throughout the right cerebral hemisphere, largest within the posterior right parietal lobe.      Vessel Imaging   CTA stroke multiphase 12/29/22  1. CT head shows no acute intracranial abnormalities.   2. No large vessel occlusion.  Short segment of high-grade stenosis of the distal M1 segment right MCA.  There is flow within the M2 segments of the right MCA.  Moderate to high-grade stenosis in the proximal M2 segment superior division left MCA.  Multifocal irregularity of the bilateral anterior, middle, and posterior cerebral  arteries as well as the intracranial vertebral arteries, likely atherosclerotic disease.   3. Calcific atherosclerosis at the bilateral carotid bifurcations with approximately 50% stenosis on the right.  Calcified plaque in the intracranial internal carotid arteries with suspected area of moderate stenosis on the right.   4. Thrombus within the left pulmonary artery.  Eccentric location suggestive of chronic thrombus, although no priors available for comparison.  Consider dedicated CTA PE protocol when clinically stable.   5. Chronic microvascular ischemic change.   6. Several irregular likely endobronchial opacities in the partially visualized right lung as detailed above, likely mucous plugging.   7. Additional findings as above.      Cardiac Imaging      TTE 12/30/2022     Summary   The left ventricle is normal in size with concentric remodeling and normal systolic function.   Normal left ventricular diastolic function.   Normal right ventricular size with normal right ventricular systolic function.   There is mild aortic valve stenosis. AV not seen well in SAX. by hemodynamic assessment this appears to be mild AS.   Aortic valve area is 1.80 cm2; peak velocity is 2.24 m/s; mean gradient is 12 mmHg.   Normal central venous pressure (3 mmHg).   The estimated ejection fraction is 65%.      Silvia Perdomo, EFREN, NP  Comprehensive Stroke Center  Department of Vascular Neurology   [facility])

## 2023-01-01 NOTE — ASSESSMENT & PLAN NOTE
Due to stroke  Aggressive PT/OT/SLP  PT/OT recommend SNF.  Placement pending.  The patient is stating this am she wants to go home.

## 2023-01-02 LAB
ALBUMIN SERPL BCP-MCNC: 3.7 G/DL (ref 3.5–5.2)
ALP SERPL-CCNC: 82 U/L (ref 55–135)
ALT SERPL W/O P-5'-P-CCNC: 16 U/L (ref 10–44)
ANION GAP SERPL CALC-SCNC: 12 MMOL/L (ref 8–16)
AST SERPL-CCNC: 21 U/L (ref 10–40)
BASOPHILS # BLD AUTO: 0.04 K/UL (ref 0–0.2)
BASOPHILS NFR BLD: 0.4 % (ref 0–1.9)
BILIRUB SERPL-MCNC: 1.1 MG/DL (ref 0.1–1)
BUN SERPL-MCNC: 19 MG/DL (ref 10–30)
CALCIUM SERPL-MCNC: 10.1 MG/DL (ref 8.7–10.5)
CHLORIDE SERPL-SCNC: 106 MMOL/L (ref 95–110)
CO2 SERPL-SCNC: 23 MMOL/L (ref 23–29)
CREAT SERPL-MCNC: 1.1 MG/DL (ref 0.5–1.4)
DIFFERENTIAL METHOD: ABNORMAL
EOSINOPHIL # BLD AUTO: 0.1 K/UL (ref 0–0.5)
EOSINOPHIL NFR BLD: 0.6 % (ref 0–8)
ERYTHROCYTE [DISTWIDTH] IN BLOOD BY AUTOMATED COUNT: 14.6 % (ref 11.5–14.5)
EST. GFR  (NO RACE VARIABLE): 45.4 ML/MIN/1.73 M^2
GLUCOSE SERPL-MCNC: 90 MG/DL (ref 70–110)
HCT VFR BLD AUTO: 58.2 % (ref 37–48.5)
HGB BLD-MCNC: 18 G/DL (ref 12–16)
IMM GRANULOCYTES # BLD AUTO: 0.06 K/UL (ref 0–0.04)
IMM GRANULOCYTES NFR BLD AUTO: 0.6 % (ref 0–0.5)
LYMPHOCYTES # BLD AUTO: 2.7 K/UL (ref 1–4.8)
LYMPHOCYTES NFR BLD: 27.5 % (ref 18–48)
MCH RBC QN AUTO: 27.5 PG (ref 27–31)
MCHC RBC AUTO-ENTMCNC: 30.9 G/DL (ref 32–36)
MCV RBC AUTO: 89 FL (ref 82–98)
MONOCYTES # BLD AUTO: 0.7 K/UL (ref 0.3–1)
MONOCYTES NFR BLD: 6.7 % (ref 4–15)
NEUTROPHILS # BLD AUTO: 6.3 K/UL (ref 1.8–7.7)
NEUTROPHILS NFR BLD: 64.2 % (ref 38–73)
NRBC BLD-RTO: 0 /100 WBC
PLATELET # BLD AUTO: 235 K/UL (ref 150–450)
PMV BLD AUTO: 9.6 FL (ref 9.2–12.9)
POTASSIUM SERPL-SCNC: 4.1 MMOL/L (ref 3.5–5.1)
PROT SERPL-MCNC: 7.1 G/DL (ref 6–8.4)
RBC # BLD AUTO: 6.55 M/UL (ref 4–5.4)
SODIUM SERPL-SCNC: 141 MMOL/L (ref 136–145)
WBC # BLD AUTO: 9.82 K/UL (ref 3.9–12.7)

## 2023-01-02 PROCEDURE — 99233 PR SUBSEQUENT HOSPITAL CARE,LEVL III: ICD-10-PCS | Mod: ,,, | Performed by: PSYCHIATRY & NEUROLOGY

## 2023-01-02 PROCEDURE — 80053 COMPREHEN METABOLIC PANEL: CPT | Performed by: PHYSICIAN ASSISTANT

## 2023-01-02 PROCEDURE — 11000001 HC ACUTE MED/SURG PRIVATE ROOM

## 2023-01-02 PROCEDURE — 25000003 PHARM REV CODE 250: Performed by: NURSE PRACTITIONER

## 2023-01-02 PROCEDURE — 36415 COLL VENOUS BLD VENIPUNCTURE: CPT | Performed by: PHYSICIAN ASSISTANT

## 2023-01-02 PROCEDURE — 85025 COMPLETE CBC W/AUTO DIFF WBC: CPT | Performed by: PHYSICIAN ASSISTANT

## 2023-01-02 PROCEDURE — 63600175 PHARM REV CODE 636 W HCPCS: Performed by: NURSE PRACTITIONER

## 2023-01-02 PROCEDURE — 99233 SBSQ HOSP IP/OBS HIGH 50: CPT | Mod: ,,, | Performed by: PSYCHIATRY & NEUROLOGY

## 2023-01-02 PROCEDURE — 25000003 PHARM REV CODE 250: Performed by: PHYSICIAN ASSISTANT

## 2023-01-02 PROCEDURE — 94761 N-INVAS EAR/PLS OXIMETRY MLT: CPT

## 2023-01-02 RX ADMIN — HEPARIN SODIUM 5000 UNITS: 5000 INJECTION INTRAVENOUS; SUBCUTANEOUS at 09:01

## 2023-01-02 RX ADMIN — ATORVASTATIN CALCIUM 40 MG: 20 TABLET, FILM COATED ORAL at 09:01

## 2023-01-02 RX ADMIN — HEPARIN SODIUM 5000 UNITS: 5000 INJECTION INTRAVENOUS; SUBCUTANEOUS at 01:01

## 2023-01-02 RX ADMIN — CLOPIDOGREL BISULFATE 75 MG: 75 TABLET ORAL at 09:01

## 2023-01-02 RX ADMIN — Medication 6 MG: at 08:01

## 2023-01-02 RX ADMIN — HEPARIN SODIUM 5000 UNITS: 5000 INJECTION INTRAVENOUS; SUBCUTANEOUS at 05:01

## 2023-01-02 NOTE — SUBJECTIVE & OBJECTIVE
Neurologic Chief Complaint: R MCA stroke    Subjective:     Interval History: Patient is seen for follow-up neurological assessment and treatment recommendations:   NAEO, delirium precautions in place and scheduled melatonin. Neuro exam stable, more alert and interactive this morning. States shes feeling better and more like herself. Medically ready for discharge, pending SNF placement.    HPI, Past Medical, Family, and Social History remains the same as documented in the initial encounter.     Review of Systems   Unable to perform ROS: Dementia   Neurological:  Positive for speech difficulty and weakness.   Psychiatric/Behavioral:  Positive for confusion.    Scheduled Meds:   atorvastatin  40 mg Oral Daily    clopidogreL  75 mg Oral Daily    heparin (porcine)  5,000 Units Subcutaneous Q8H    melatonin  6 mg Oral Nightly     Continuous Infusions:   sodium chloride 0.9%       PRN Meds:acetaminophen, labetaloL, sodium chloride 0.9%, sodium chloride 0.9%    Objective:     Vital Signs (Most Recent):  Temp: 97.9 °F (36.6 °C) (01/02/23 0828)  Pulse: 81 (01/02/23 0828)  Resp: 18 (01/02/23 0828)  BP: (!) 151/76 (01/02/23 0828)  SpO2: (!) 93 % (01/02/23 0828)  BP Location: Right arm    Vital Signs Range (Last 24H):  Temp:  [97 °F (36.1 °C)-98.1 °F (36.7 °C)]   Pulse:  [63-85]   Resp:  [16-18]   BP: (113-165)/(56-95)   SpO2:  [92 %-96 %]   BP Location: Right arm      Physical Exam  Vitals and nursing note reviewed.   Constitutional:       General: She is not in acute distress.     Appearance: She is well-developed.   HENT:      Head: Normocephalic.      Nose: Nose normal.      Mouth/Throat:      Mouth: Mucous membranes are moist.   Eyes:      General:         Right eye: No discharge.         Left eye: No discharge.      Extraocular Movements: Extraocular movements intact.      Conjunctiva/sclera: Conjunctivae normal.   Cardiovascular:      Rate and Rhythm: Normal rate and regular rhythm.   Pulmonary:      Effort: Pulmonary  effort is normal. No respiratory distress.   Abdominal:      General: There is no distension.      Palpations: Abdomen is soft.      Tenderness: There is no abdominal tenderness.   Musculoskeletal:      Cervical back: Normal range of motion and neck supple.      Right lower leg: No edema.      Left lower leg: No edema.   Skin:     General: Skin is warm and dry.      Capillary Refill: Capillary refill takes less than 2 seconds.   Neurological:      Mental Status: She is alert and easily aroused. She is disoriented.      Motor: Weakness present.       Neurological Exam:   LOC: alert  Attention Span: Good   Language: No aphasia  Articulation: No dysarthria  Orientation: Not oriented to place, Not oriented to time  Facial Movement (CN VII): Symmetric facial expression    Motor: Arm left  Normal 5/5  Leg left  Paresis: 4/5  Arm right  Normal 5/5  Leg right Paresis: 4/5  Sensation: Intact to light touch    Laboratory:  CMP: No results for input(s): GLUCOSE, CALCIUM, ALBUMIN, PROT, NA, K, CO2, CL, BUN, CREATININE, ALKPHOS, ALT, AST, BILITOT in the last 24 hours.  CBC:   Recent Labs   Lab 12/31/22  0519   WBC 8.51   RBC 5.93*   HGB 16.3*   HCT 55.1*   *   MCV 93   MCH 27.5   MCHC 29.6*       Coagulation:   Recent Labs   Lab 12/30/22  0341   INR 1.0   APTT 31.7         Diagnostic Results     Brain Imaging   CTH 12/30/2022 Impression: CT head appears unchanged from prior.  Areas of acute infarction noted on MRI are not appreciated on routine CT, due to differences in technique.  No evidence of hemorrhagic conversion.  Chronic microvascular ischemic change.  Moderate generalized cerebral volume loss.     MRI brain without contrast 12/29/22  Study is limited by motion artifact.   Scattered small acute infarctions throughout the right cerebral hemisphere, largest within the posterior right parietal lobe.        Vessel Imaging   CTA stroke multiphase 12/29/22  1. CT head shows no acute intracranial abnormalities.   2. No  large vessel occlusion.  Short segment of high-grade stenosis of the distal M1 segment right MCA.  There is flow within the M2 segments of the right MCA.  Moderate to high-grade stenosis in the proximal M2 segment superior division left MCA.  Multifocal irregularity of the bilateral anterior, middle, and posterior cerebral arteries as well as the intracranial vertebral arteries, likely atherosclerotic disease.   3. Calcific atherosclerosis at the bilateral carotid bifurcations with approximately 50% stenosis on the right.  Calcified plaque in the intracranial internal carotid arteries with suspected area of moderate stenosis on the right.   4. Thrombus within the left pulmonary artery.  Eccentric location suggestive of chronic thrombus, although no priors available for comparison.  Consider dedicated CTA PE protocol when clinically stable.   5. Chronic microvascular ischemic change.   6. Several irregular likely endobronchial opacities in the partially visualized right lung as detailed above, likely mucous plugging.   7. Additional findings as above.        Cardiac Imaging   TTE 12/30/2022  The left ventricle is normal in size with concentric remodeling and normal systolic function.  Normal left ventricular diastolic function.  Normal right ventricular size with normal right ventricular systolic function.  There is mild aortic valve stenosis. AV not seen well in SAX. by hemodynamic assessment this appears to be mild AS.  Aortic valve area is 1.80 cm2; peak velocity is 2.24 m/s; mean gradient is 12 mmHg.  Normal central venous pressure (3 mmHg).  The estimated ejection fraction is 65%.

## 2023-01-02 NOTE — PROGRESS NOTES
Amol Colon - Neurosurgery ([facility])  Vascular Neurology  Comprehensive Stroke Center  Progress Note    Assessment/Plan:     * Thrombotic stroke involving right middle cerebral artery  89 year old female with past medical history of dementia, HTN, and DM who presented to ED with c/o slurred speech and weakness. CTA stroke multiphase negative for acute process, shows multifocal moderate-severe ICAD. MRI brain with acute infarctions in the R MCA territory. Suspect etiology at this time likely ICAD.    NAEO, delirium precautions in place and scheduled melatonin. Neuro exam stable, more alert and interactive this morning. States shes feeling better and more like herself. Medically ready for discharge, pending SNF placement.      Antithrombotics for secondary stroke prevention: Antiplatelets: Clopidogrel: 75 mg daily    Statins for secondary stroke prevention and hyperlipidemia, if present:   Statins: Atorvastatin- 40 mg daily    Aggressive risk factor modification: HTN, HLD, AAD, ? neoplasm     Rehab efforts: The patient has been evaluated by a stroke team provider and the therapy needs have been fully considered based off the presenting complaints and exam findings. The following therapy evaluations are needed: PT evaluate and treat, OT evaluate and treat, SLP evaluate and treat-Recommending SNF, on a mechanical soft nectar thick diet    Diagnostics ordered/pending: None     VTE prophylaxis: Heparin 5000 units SQ every 8 hours  Mechanical prophylaxis: Place SCDs    BP parameters: Infarct: No intervention, SBP <220      Moderate malnutrition  -Seen by Dietician.  Boost w/meals.    Other hydronephrosis  -Noted on CTA chest obtained on 1/2/2023.  -Renal US with hydronephrosis (L >>> R) and non-obstructing nephrolithiasis of R kidney  -Urology consulted, appreciate the consult and recs  -CT Renal stone study obtained and revealed UPJO that Urology feels is likely chronic with no need for intervention at this time.    Mixed  "hyperlipidemia  Stroke risk factor  , goal <70  Continue atorvastatin 40mg daily    Abnormal CT scan, chest  -CTA Chest w/multiple bilatera nodular foci in the upper and lower lobes, the largest in the LLL measuring 2.1 cm.    -Follow up outpatient.  -Mild patchy bilateral ground-glass infiltrates, pneumonitis vs mild edema.  Single view CXR negative for acute findings.  Monitor.    Aneurysm of ascending aorta without rupture  -4.5 cm fusiform aneurysmal dilation of the ascending thoracic aorta noted on CTA Chest (PE study) obtained 1/2/2023.  -Follow up outpatient    Weakness  Due to stroke  Aggressive PT/OT/SLP  PT/OT recommend SNF, placement pending         12/30/2022 NAEO, neuro exam stable. Patient noted to be more lethargic this morning, repeat CTH ordered. Echo and therapy evals pending to complete stroke workup. Diet advanced to mechanical soft with nectar thick liquids.  12/31/2022 NAEON.  Neuro exam remains stable.  She remains lethargic.  Repeat CTH ordered yesterday was negative for any acute findings.  TTE w/mild aortic stenosis.  EF 65%.  Therapy rec SNF placement.  1/1/2023 Patient was restless and mildly agitated around 2030.  Was given melatonin and had improvement of symptoms.  This morning the patient is easily arousable.  She repeatedly states "I'm fine, nothing is wrong." "I want to go home.  My family will take care of me, they take good care of me."  01/02/2023 NAEO, delirium precautions in place and scheduled melatonin. Neuro exam stable, more alert and interactive this morning. States shes feeling better and more like herself. Medically ready for discharge, pending SNF placement.        STROKE DOCUMENTATION   Acute Stroke Times   Last Known Normal Date: 12/29/22  Last Known Normal Time: 2130  Symptom Onset Date: 12/29/22  Unknown Symptom Onset Date: Unknown Date  Symptom Onset Time: 1000  Unknown Symptom Onset Time: Unknown Time  Stroke Team Called Date: 12/29/22  Stroke Team Called " Time: 1640  Stroke Team Arrival Date: 12/29/22  Stroke Team Arrival Time: 1645  CT Interpretation Time: 1646  Thrombolytic Therapy Recommended: No  CTA Interpretation Time: 1650  Thrombectomy Recommended: No    NIH Scale:  1a. Level of Consciousness: 0-->Alert, keenly responsive  1b. LOC Questions: 1-->Answers one question correctly  1c. LOC Commands: 0-->Performs both tasks correctly  2. Best Gaze: 0-->Normal  3. Visual: 0-->No visual loss  4. Facial Palsy: 0-->Normal symmetrical movements  5a. Motor Arm, Left: 0-->No drift, limb holds 90 (or 45) degrees for full 10 secs  5b. Motor Arm, Right: 0-->No drift, limb holds 90 (or 45) degrees for full 10 secs  6a. Motor Leg, Left: 2-->Some effort against gravity, leg falls to bed by 5 secs, but has some effort against gravity  6b. Motor Leg, Right: 2-->Some effort against gravity, leg falls to bed by 5 secs, but has some effort against gravity  7. Limb Ataxia: 0-->Absent  8. Sensory: 0-->Normal, no sensory loss  9. Best Language: 0-->No aphasia, normal  10. Dysarthria: 0-->Normal  11. Extinction and Inattention (formerly Neglect): 0-->No abnormality  Total (NIH Stroke Scale): 5       Modified Madeline Score: 3  Glenford Coma Scale:    ABCD2 Score:    [id number] Score:   HAS -BLED Score:   ICH Score:   Hunt & Katz Classification:      Hemorrhagic change of an Ischemic Stroke: Does this patient have an ischemic stroke with hemorrhagic changes? No     Neurologic Chief Complaint: R MCA stroke    Subjective:     Interval History: Patient is seen for follow-up neurological assessment and treatment recommendations:   NAEO, delirium precautions in place and scheduled melatonin. Neuro exam stable, more alert and interactive this morning. States shes feeling better and more like herself. Medically ready for discharge, pending SNF placement.    HPI, Past Medical, Family, and Social History remains the same as documented in the initial encounter.     Review of Systems   Unable to  perform ROS: Dementia   Neurological:  Positive for speech difficulty and weakness.   Psychiatric/Behavioral:  Positive for confusion.    Scheduled Meds:   atorvastatin  40 mg Oral Daily    clopidogreL  75 mg Oral Daily    heparin (porcine)  5,000 Units Subcutaneous Q8H    melatonin  6 mg Oral Nightly     Continuous Infusions:   sodium chloride 0.9%       PRN Meds:acetaminophen, labetaloL, sodium chloride 0.9%, sodium chloride 0.9%    Objective:     Vital Signs (Most Recent):  Temp: 97.9 °F (36.6 °C) (01/02/23 0828)  Pulse: 81 (01/02/23 0828)  Resp: 18 (01/02/23 0828)  BP: (!) 151/76 (01/02/23 0828)  SpO2: (!) 93 % (01/02/23 0828)  BP Location: Right arm    Vital Signs Range (Last 24H):  Temp:  [97 °F (36.1 °C)-98.1 °F (36.7 °C)]   Pulse:  [63-85]   Resp:  [16-18]   BP: (113-165)/(56-95)   SpO2:  [92 %-96 %]   BP Location: Right arm      Physical Exam  Vitals and nursing note reviewed.   Constitutional:       General: She is not in acute distress.     Appearance: She is well-developed.   HENT:      Head: Normocephalic.      Nose: Nose normal.      Mouth/Throat:      Mouth: Mucous membranes are moist.   Eyes:      General:         Right eye: No discharge.         Left eye: No discharge.      Extraocular Movements: Extraocular movements intact.      Conjunctiva/sclera: Conjunctivae normal.   Cardiovascular:      Rate and Rhythm: Normal rate and regular rhythm.   Pulmonary:      Effort: Pulmonary effort is normal. No respiratory distress.   Abdominal:      General: There is no distension.      Palpations: Abdomen is soft.      Tenderness: There is no abdominal tenderness.   Musculoskeletal:      Cervical back: Normal range of motion and neck supple.      Right lower leg: No edema.      Left lower leg: No edema.   Skin:     General: Skin is warm and dry.      Capillary Refill: Capillary refill takes less than 2 seconds.   Neurological:      Mental Status: She is alert and easily aroused. She is disoriented.       Motor: Weakness present.       Neurological Exam:   LOC: alert  Attention Span: Good   Language: No aphasia  Articulation: No dysarthria  Orientation: Not oriented to place, Not oriented to time  Facial Movement (CN VII): Symmetric facial expression    Motor: Arm left  Normal 5/5  Leg left  Paresis: 4/5  Arm right  Normal 5/5  Leg right Paresis: 4/5  Sensation: Intact to light touch    Laboratory:  CMP: No results for input(s): GLUCOSE, CALCIUM, ALBUMIN, PROT, NA, K, CO2, CL, BUN, CREATININE, ALKPHOS, ALT, AST, BILITOT in the last 24 hours.  CBC:   Recent Labs   Lab 12/31/22  0519   WBC 8.51   RBC 5.93*   HGB 16.3*   HCT 55.1*   *   MCV 93   MCH 27.5   MCHC 29.6*       Coagulation:   Recent Labs   Lab 12/30/22  0341   INR 1.0   APTT 31.7         Diagnostic Results     Brain Imaging   CTH 12/30/2022 Impression: CT head appears unchanged from prior.  Areas of acute infarction noted on MRI are not appreciated on routine CT, due to differences in technique.  No evidence of hemorrhagic conversion.  Chronic microvascular ischemic change.  Moderate generalized cerebral volume loss.     MRI brain without contrast 12/29/22  Study is limited by motion artifact.   Scattered small acute infarctions throughout the right cerebral hemisphere, largest within the posterior right parietal lobe.        Vessel Imaging   CTA stroke multiphase 12/29/22  1. CT head shows no acute intracranial abnormalities.   2. No large vessel occlusion.  Short segment of high-grade stenosis of the distal M1 segment right MCA.  There is flow within the M2 segments of the right MCA.  Moderate to high-grade stenosis in the proximal M2 segment superior division left MCA.  Multifocal irregularity of the bilateral anterior, middle, and posterior cerebral arteries as well as the intracranial vertebral arteries, likely atherosclerotic disease.   3. Calcific atherosclerosis at the bilateral carotid bifurcations with approximately 50% stenosis on the  right.  Calcified plaque in the intracranial internal carotid arteries with suspected area of moderate stenosis on the right.   4. Thrombus within the left pulmonary artery.  Eccentric location suggestive of chronic thrombus, although no priors available for comparison.  Consider dedicated CTA PE protocol when clinically stable.   5. Chronic microvascular ischemic change.   6. Several irregular likely endobronchial opacities in the partially visualized right lung as detailed above, likely mucous plugging.   7. Additional findings as above.        Cardiac Imaging   TTE 12/30/2022   The left ventricle is normal in size with concentric remodeling and normal systolic function.   Normal left ventricular diastolic function.   Normal right ventricular size with normal right ventricular systolic function.   There is mild aortic valve stenosis. AV not seen well in SAX. by hemodynamic assessment this appears to be mild AS.   Aortic valve area is 1.80 cm2; peak velocity is 2.24 m/s; mean gradient is 12 mmHg.   Normal central venous pressure (3 mmHg).   The estimated ejection fraction is 65%.      [facility]  Comprehensive Stroke Center  Department of Vascular Neurology   [facility])

## 2023-01-02 NOTE — ASSESSMENT & PLAN NOTE
Due to stroke  Aggressive PT/OT/SLP  PT/OT recommend SNF, placement pending   Scheduled For Follow Up In (Optional): 3 months nails Detail Level: Simple

## 2023-01-02 NOTE — PLAN OF CARE
Problem: Bowel Elimination Impaired (Stroke, Ischemic/Transient Ischemic Attack)  Goal: Effective Bowel Elimination  Outcome: Ongoing, Progressing     Problem: Cerebral Tissue Perfusion (Stroke, Ischemic/Transient Ischemic Attack)  Goal: Optimal Cerebral Tissue Perfusion  Outcome: Ongoing, Progressing     Problem: Cognitive Impairment (Stroke, Ischemic/Transient Ischemic Attack)  Goal: Optimal Cognitive Function  Outcome: Ongoing, Progressing       POC and stroke booklet reviewed with patient at the beside. No evidence of learning noted related to patients continued confusion. Stroke booklet remains at the bedside. Cardiac monitoring ongoing when patient will keep electrodes in place. Vital signs all shift as ordered per delirium precautions. Patient noted to have poor night time sleep although melatonin was given as ordered due to patient having increased daytime sleep on previous shift. Precautions maintained. Bed low and locked with call light within reach and bed alarm activated. Tele sitter remains at the beside. POC ongoing.

## 2023-01-02 NOTE — ASSESSMENT & PLAN NOTE
89 year old female with past medical history of dementia, HTN, and DM who presented to ED with c/o slurred speech and weakness. CTA stroke multiphase negative for acute process, shows multifocal moderate-severe ICAD. MRI brain with acute infarctions in the R MCA territory. Suspect etiology at this time likely ICAD.    NAEO, delirium precautions in place and scheduled melatonin. Neuro exam stable, more alert and interactive this morning. States shes feeling better and more like herself. Medically ready for discharge, pending SNF placement.      Antithrombotics for secondary stroke prevention: Antiplatelets: Clopidogrel: 75 mg daily    Statins for secondary stroke prevention and hyperlipidemia, if present:   Statins: Atorvastatin- 40 mg daily    Aggressive risk factor modification: HTN, HLD, AAD, ? neoplasm     Rehab efforts: The patient has been evaluated by a stroke team provider and the therapy needs have been fully considered based off the presenting complaints and exam findings. The following therapy evaluations are needed: PT evaluate and treat, OT evaluate and treat, SLP evaluate and treat-Recommending SNF, on a mechanical soft nectar thick diet    Diagnostics ordered/pending: None     VTE prophylaxis: Heparin 5000 units SQ every 8 hours  Mechanical prophylaxis: Place SCDs    BP parameters: Infarct: No intervention, SBP <220

## 2023-01-02 NOTE — ASSESSMENT & PLAN NOTE
-4.5 cm fusiform aneurysmal dilation of the ascending thoracic aorta noted on CTA Chest (PE study) obtained 1/2/2023.  -Follow up outpatient

## 2023-01-02 NOTE — ASSESSMENT & PLAN NOTE
-Noted on CTA chest obtained on 1/2/2023.  -Renal US with hydronephrosis (L >>> R) and non-obstructing nephrolithiasis of R kidney  -Urology consulted, appreciate the consult and recs  -CT Renal stone study obtained and revealed UPJO that Urology feels is likely chronic with no need for intervention at this time.

## 2023-01-03 LAB — SARS-COV-2 RNA RESP QL NAA+PROBE: NOT DETECTED

## 2023-01-03 PROCEDURE — 97110 THERAPEUTIC EXERCISES: CPT | Mod: CQ

## 2023-01-03 PROCEDURE — 25000003 PHARM REV CODE 250: Performed by: PSYCHIATRY & NEUROLOGY

## 2023-01-03 PROCEDURE — 25000003 PHARM REV CODE 250: Performed by: PHYSICIAN ASSISTANT

## 2023-01-03 PROCEDURE — 94761 N-INVAS EAR/PLS OXIMETRY MLT: CPT

## 2023-01-03 PROCEDURE — 51798 US URINE CAPACITY MEASURE: CPT

## 2023-01-03 PROCEDURE — 99233 PR SUBSEQUENT HOSPITAL CARE,LEVL III: ICD-10-PCS | Mod: ,,, | Performed by: PSYCHIATRY & NEUROLOGY

## 2023-01-03 PROCEDURE — 97535 SELF CARE MNGMENT TRAINING: CPT

## 2023-01-03 PROCEDURE — 92526 ORAL FUNCTION THERAPY: CPT

## 2023-01-03 PROCEDURE — 92523 SPEECH SOUND LANG COMPREHEN: CPT

## 2023-01-03 PROCEDURE — 25000003 PHARM REV CODE 250: Performed by: NURSE PRACTITIONER

## 2023-01-03 PROCEDURE — 11000001 HC ACUTE MED/SURG PRIVATE ROOM

## 2023-01-03 PROCEDURE — U0005 INFEC AGEN DETEC AMPLI PROBE: HCPCS | Performed by: PHYSICIAN ASSISTANT

## 2023-01-03 PROCEDURE — 99233 SBSQ HOSP IP/OBS HIGH 50: CPT | Mod: ,,, | Performed by: PSYCHIATRY & NEUROLOGY

## 2023-01-03 PROCEDURE — 63600175 PHARM REV CODE 636 W HCPCS: Performed by: NURSE PRACTITIONER

## 2023-01-03 RX ORDER — MICONAZOLE NITRATE 2 %
POWDER (GRAM) TOPICAL 2 TIMES DAILY
Status: DISCONTINUED | OUTPATIENT
Start: 2023-01-03 | End: 2023-01-04 | Stop reason: HOSPADM

## 2023-01-03 RX ORDER — SODIUM CHLORIDE 9 MG/ML
INJECTION, SOLUTION INTRAVENOUS CONTINUOUS
Status: DISCONTINUED | OUTPATIENT
Start: 2023-01-04 | End: 2023-01-04

## 2023-01-03 RX ORDER — SODIUM CHLORIDE 9 MG/ML
INJECTION, SOLUTION INTRAVENOUS CONTINUOUS
Status: ACTIVE | OUTPATIENT
Start: 2023-01-03 | End: 2023-01-03

## 2023-01-03 RX ADMIN — SODIUM CHLORIDE: 9 INJECTION, SOLUTION INTRAVENOUS at 09:01

## 2023-01-03 RX ADMIN — CLOPIDOGREL BISULFATE 75 MG: 75 TABLET ORAL at 09:01

## 2023-01-03 RX ADMIN — MICONAZOLE NITRATE 2 % TOPICAL POWDER: at 09:01

## 2023-01-03 RX ADMIN — Medication 6 MG: at 09:01

## 2023-01-03 RX ADMIN — ATORVASTATIN CALCIUM 40 MG: 20 TABLET, FILM COATED ORAL at 09:01

## 2023-01-03 RX ADMIN — HEPARIN SODIUM 5000 UNITS: 5000 INJECTION INTRAVENOUS; SUBCUTANEOUS at 05:01

## 2023-01-03 RX ADMIN — HEPARIN SODIUM 5000 UNITS: 5000 INJECTION INTRAVENOUS; SUBCUTANEOUS at 03:01

## 2023-01-03 RX ADMIN — HEPARIN SODIUM 5000 UNITS: 5000 INJECTION INTRAVENOUS; SUBCUTANEOUS at 09:01

## 2023-01-03 NOTE — PLAN OF CARE
[facility] completed LOCET via phone. [facility] faxed PASSR to obtain the 142 for NH admission.    NORBERTO Dye  [phone]

## 2023-01-03 NOTE — PT/OT/SLP PROGRESS
Physical Therapy Treatment    Patient Name:  Franny Arnold   MRN:  [id number]    Recommendations:     Discharge Recommendations: nursing facility, skilled  Discharge Equipment Recommendations: none  Barriers to discharge:  Increased level of assistance     Assessment:     Franny Arnold is a 89 y.o. female admitted with a medical diagnosis of Thrombotic stroke involving right middle cerebral artery.  She presents with the following impairments/functional limitations: weakness, impaired sensation, impaired functional mobility, impaired endurance, impaired balance, gait instability, decreased coordination, impaired cognition, decreased lower extremity function, decreased upper extremity function. Pt participated, and tolerated treatment well. Pt will continue to benefit from skilled PT services to improve all deficits noted above. Resume PT POC as indicated.     Rehab Prognosis: Good; patient would benefit from acute skilled PT services to address these deficits and reach maximum level of function.    Recent Surgery: * No surgery found *      Plan:     During this hospitalization, patient to be seen 3 x/week to address the identified rehab impairments via gait training, therapeutic activities, therapeutic exercises, neuromuscular re-education and progress toward the following goals:    Plan of Care Expires:  01/28/23    Subjective     Chief Complaint: none stated  Patient/Family Comments/goals: none stated  Pain/Comfort:  Pain Rating 1: 0/10  Pain Rating Post-Intervention 1: 0/10      Objective:     Communicated with nursing prior to session.  Patient found up in chair with  (all lines intact and PCT in room) upon PT entry to room.     General Precautions: Standard, aspiration, fall, nectar thick  Orthopedic Precautions: N/A  Braces: N/A  Respiratory Status: Room air     Functional Mobility:  -Not performed 2/2 pt refusal.       AM-PAC 6 CLICK MOBILITY  Turning over in bed (including adjusting bedclothes, sheets and  blankets)?: 2  Sitting down on and standing up from a chair with arms (e.g., wheelchair, bedside commode, etc.): 2  Moving from lying on back to sitting on the side of the bed?: 2  Moving to and from a bed to a chair (including a wheelchair)?: 1  Need to walk in hospital room?: 1  Climbing 3-5 steps with a railing?: 1  Basic Mobility Total Score: 9       Treatment & Education:  -BLE therex x10 reps with assistance and cueing t/o for proper technique.     Patient left up in chair with all lines intact, call button in reach, and PCT present..    GOALS:   Multidisciplinary Problems       Physical Therapy Goals          Problem: Physical Therapy    Goal Priority Disciplines Outcome Goal Variances Interventions   Physical Therapy Goal     PT, PT/OT Ongoing, Progressing     Description: Goals to be met by: 23     Patient will increase functional independence with mobility by performin. Supine to sit with Stand-by Assistance  2. Sit to supine with Stand-by Assistance  3. Sit to stand transfer with Minimal Assistance  4. Bed to chair transfer with Minimal Assistance using Rolling Walker  5. Gait  x 50 feet with Minimal Assistance using Rolling Walker.   6. Sitting at edge of bed x5 minutes with Supervision  7. Stand for 3 minutes with Stand-by Assistance using Rolling Walker  8. Lower extremity exercise program x8 reps per handout, with supervision                         Time Tracking:     PT Received On: 23  PT Start Time: 923     PT Stop Time: 933  PT Total Time (min): 10 min     Billable Minutes: Therapeutic Exercise 10    Treatment Type: Treatment  PT/PTA: PTA     PTA Visit Number: 2023

## 2023-01-03 NOTE — PLAN OF CARE
Problem: Adjustment to Illness (Stroke, Ischemic/Transient Ischemic Attack)  Goal: Optimal Coping  Outcome: Ongoing, Progressing     Problem: Bowel Elimination Impaired (Stroke, Ischemic/Transient Ischemic Attack)  Goal: Effective Bowel Elimination  Outcome: Ongoing, Progressing     Problem: Cognitive Impairment (Stroke, Ischemic/Transient Ischemic Attack)  Goal: Optimal Cognitive Function  Outcome: Ongoing, Progressing     Problem: Communication Impairment (Stroke, Ischemic/Transient Ischemic Attack)  Goal: Improved Communication Skills  Outcome: Ongoing, Progressing     Problem: Swallowing Impairment (Stroke, Ischemic/Transient Ischemic Attack)  Goal: Optimal Eating and Swallowing without Aspiration  Outcome: Ongoing, Progressing     Problem: Fall Injury Risk  Goal: Absence of Fall and Fall-Related Injury  Outcome: Ongoing, Progressing     Problem: Adult Inpatient Plan of Care  Goal: Plan of Care Review  Outcome: Ongoing, Progressing  Flowsheets (Taken 1/3/2023 0030)  Plan of Care Reviewed With: patient  Goal: Optimal Comfort and Wellbeing  Outcome: Ongoing, Progressing     Problem: Skin Injury Risk Increased  Goal: Skin Health and Integrity  Outcome: Ongoing, Progressing             POC updated and reviewed with the patient at the bedside. Questions regarding POC were encouraged and addressed. VSS, see flowsheets. Patient is AOX self at this time. Tele maintained. Fall and safety precautions maintained, no signs of injury noted during shift. Patient was repositioned for comfort with bed locked in low position, side rails up x 4, bed alarm set, with call light within reach. Instructed patient to call staff for mobility, verbalized understanding. Telesitter to remain at bedside. Stroke book and education reviewed at the bedside, see education flowsheets for details. No acute signs of distress noted at this time.

## 2023-01-03 NOTE — SUBJECTIVE & OBJECTIVE
Neurologic Chief Complaint: R MCA stroke    Subjective:     Interval History: Patient is seen for follow-up neurological assessment and treatment recommendations:   NAEO, neuro exam stable. Resting this morning after working with therapy, denies any complaints/concerns. Maintenance IVF for mild dehydration on labs this morning. Discharge to SNF pending.    HPI, Past Medical, Family, and Social History remains the same as documented in the initial encounter.     Review of Systems   Unable to perform ROS: Dementia   Neurological:  Positive for speech difficulty and weakness.   Psychiatric/Behavioral:  Positive for confusion.    Scheduled Meds:   atorvastatin  40 mg Oral Daily    clopidogreL  75 mg Oral Daily    heparin (porcine)  5,000 Units Subcutaneous Q8H    melatonin  6 mg Oral Nightly     Continuous Infusions:   sodium chloride 0.9% 75 mL/hr at 01/03/23 0942    sodium chloride 0.9%       PRN Meds:acetaminophen, influenza 65up-adj, labetaloL, sodium chloride 0.9%, sodium chloride 0.9%    Objective:     Vital Signs (Most Recent):  Temp: 97.4 °F (36.3 °C) (01/03/23 0747)  Pulse: 73 (01/03/23 0753)  Resp: 18 (01/03/23 0747)  BP: 127/65 (01/03/23 0747)  SpO2: 97 % (01/03/23 0747)  BP Location: Left arm    Vital Signs Range (Last 24H):  Temp:  [97.4 °F (36.3 °C)-98.3 °F (36.8 °C)]   Pulse:  [59-96]   Resp:  [13-18]   BP: ()/(58-96)   SpO2:  [92 %-97 %]   BP Location: Left arm      Physical Exam  Vitals and nursing note reviewed.   Constitutional:       General: She is sleeping. She is not in acute distress.     Appearance: She is well-developed.   HENT:      Head: Normocephalic.      Nose: Nose normal.      Mouth/Throat:      Mouth: Mucous membranes are moist.   Eyes:      General:         Right eye: No discharge.         Left eye: No discharge.      Extraocular Movements: Extraocular movements intact.      Conjunctiva/sclera: Conjunctivae normal.   Cardiovascular:      Rate and Rhythm: Normal rate and regular  rhythm.   Pulmonary:      Effort: Pulmonary effort is normal. No respiratory distress.   Abdominal:      General: There is no distension.      Palpations: Abdomen is soft.      Tenderness: There is no abdominal tenderness.   Musculoskeletal:      Cervical back: Normal range of motion and neck supple.      Right lower leg: No edema.      Left lower leg: No edema.   Skin:     General: Skin is warm and dry.      Capillary Refill: Capillary refill takes less than 2 seconds.   Neurological:      Mental Status: She is easily aroused. She is disoriented.      Motor: Weakness present.       Neurological Exam:   LOC: sleeping, easily arouses  Attention Span: Good   Language: No aphasia  Articulation: No dysarthria  Orientation: Not oriented to place, Not oriented to time  Facial Movement (CN VII): Symmetric facial expression    Motor: Arm left  Normal 5/5  Leg left  Paresis: 4/5  Arm right  Normal 5/5  Leg right Paresis: 4/5  Sensation: Intact to light touch    Laboratory:  CMP: No results for input(s): GLUCOSE, CALCIUM, ALBUMIN, PROT, NA, K, CO2, CL, BUN, CREATININE, ALKPHOS, ALT, AST, BILITOT in the last 24 hours.  CBC:   Recent Labs   Lab 01/02/23  0955   WBC 9.82   RBC 6.55*   HGB 18.0*   HCT 58.2*      MCV 89   MCH 27.5   MCHC 30.9*       Coagulation:   Recent Labs   Lab 12/30/22  0341   INR 1.0   APTT 31.7         Diagnostic Results     Brain Imaging   CT 12/30/2022 Impression: CT head appears unchanged from prior.  Areas of acute infarction noted on MRI are not appreciated on routine CT, due to differences in technique.  No evidence of hemorrhagic conversion.  Chronic microvascular ischemic change.  Moderate generalized cerebral volume loss.     MRI brain without contrast 12/29/22  Study is limited by motion artifact.   Scattered small acute infarctions throughout the right cerebral hemisphere, largest within the posterior right parietal lobe.        Vessel Imaging   CTA stroke multiphase 12/29/22  1. CT head  shows no acute intracranial abnormalities.   2. No large vessel occlusion.  Short segment of high-grade stenosis of the distal M1 segment right MCA.  There is flow within the M2 segments of the right MCA.  Moderate to high-grade stenosis in the proximal M2 segment superior division left MCA.  Multifocal irregularity of the bilateral anterior, middle, and posterior cerebral arteries as well as the intracranial vertebral arteries, likely atherosclerotic disease.   3. Calcific atherosclerosis at the bilateral carotid bifurcations with approximately 50% stenosis on the right.  Calcified plaque in the intracranial internal carotid arteries with suspected area of moderate stenosis on the right.   4. Thrombus within the left pulmonary artery.  Eccentric location suggestive of chronic thrombus, although no priors available for comparison.  Consider dedicated CTA PE protocol when clinically stable.   5. Chronic microvascular ischemic change.   6. Several irregular likely endobronchial opacities in the partially visualized right lung as detailed above, likely mucous plugging.   7. Additional findings as above.        Cardiac Imaging   TTE 12/30/2022  The left ventricle is normal in size with concentric remodeling and normal systolic function.  Normal left ventricular diastolic function.  Normal right ventricular size with normal right ventricular systolic function.  There is mild aortic valve stenosis. AV not seen well in SAX. by hemodynamic assessment this appears to be mild AS.  Aortic valve area is 1.80 cm2; peak velocity is 2.24 m/s; mean gradient is 12 mmHg.  Normal central venous pressure (3 mmHg).  The estimated ejection fraction is 65%.

## 2023-01-03 NOTE — PT/OT/SLP EVAL
"Speech Language Pathology Evaluation  Cognitive Communication    Patient Name:  Franny Arnold   MRN:  [id number]  Admitting Diagnosis: Thrombotic stroke involving right middle cerebral artery    Recommendations:     Recommendations:                General Recommendations:  Dysphagia therapy  Diet recommendations:  Mechanical soft, [place] Thick   Aspiration Precautions: Assistance with meals and Assistance with thickening liquids, Feed only when awake/alert, HOB to 90 degrees, Meds crushed in puree, No straws, Small bites/sips, and Strict aspiration precautions   General Precautions: Standard, aspiration, nectar thick  Communication strategies:  none    History:     Past Medical History:   Diagnosis Date    Basal cell carcinoma     Dementia        Past Surgical History:   Procedure Laterality Date    APPENDECTOMY      INSERTION OF INTRAMEDULLARY ZAYNAB Left 1/2/2020    Procedure: INSERTION, INTRAMEDULLARY ZAYNAB;  Surgeon: Adithya Addison MD;  Location: [facility] OR [address] [place] [address];  Service: Orthopedics;  Laterality: Left;    TONSILLECTOMY             Prior diet: regular with thin.        Subjective     "First class" pt. Response when asked where she was  Patient goals: tricia     Pain/Comfort:  Pain Rating 1: 0/10  Pain Rating Post-Intervention 1: 0/10    Respiratory Status: Room air    Objective:   Cognitive Status:    Pt. Alert though very confused with verbal responses being off task.  Language was confused with poor ability to participate in simple conversation beyond one conversational turn.  No initation of communication noted.        Receptive Language:   Comprehension:   Pt. Did not model simple commands and yes/no response was off task    Pragmatics:    Poor attention  Poor eye contact    Expressive Language:  Verbal:    Pt. Did not name any common objects       Motor Speech:  Mild-mod dysarthria    Voice:   wfl    Visual-Spatial:  tba    Reading:   tba     Written Expression:   tba    Treatment: pt. Was observed self " feeding 4 sips of nectar thick juice via cup.  No overt coughing, throat clearing noted following the swallow.      Assessment:   Franny Arnold is a 89 y.o. female with an SLP diagnosis of Dysphagia, Dysarthria, and Cognitive-Linguistic Impairment.   Goals:   Multidisciplinary Problems       SLP Goals          Problem: SLP    Goal Priority Disciplines Outcome   SLP Goal     SLP Ongoing, Progressing   Description: Goals due 1/6/2022  1.  [facility] with nectar thick liquids with no s/s of aspiration  2.  Pt. Will participate in speech language eval                       Plan:   Patient to be seen:  4 x/week   Plan of Care expires:  01/27/23  Plan of Care reviewed with:  patient   SLP Follow-Up:  Yes       Discharge recommendations:  Discharge Facility/Level of Care Needs: other (see comments)   Barriers to Discharge:  None    Time Tracking:   SLP Treatment Date:   01/03/23  Speech Start Time:  0845  Speech Stop Time:  0900     Speech Total Time (min):  15 min    Billable Minutes: Eval 7  and Treatment Swallowing Dysfunction 8    01/03/2023

## 2023-01-03 NOTE — PLAN OF CARE
01/03/23 1237   Post-Acute Status   Post-Acute Authorization Placement   Post-Acute Placement Status Referrals Sent   Discharge Plan   Discharge Plan A Skilled Nursing Facility   Discharge Plan B Home Health;Home with family     ANNA talked with patient's caregiver/nidary York re: SNF choices.  She chose [facility] as her first choice and [facility] and [place] for the other 2 choices.  ANNA sent referrals and spoke with Kamini at [facility].  She is reviewing the clincals and will let this writer know.  ANNA will continue to follow.  Patient medically cleared for SNF today.      Francoise Murphy, [place]  [facility]  [phone]

## 2023-01-03 NOTE — PLAN OF CARE
Problem: Adjustment to Illness (Stroke, Ischemic/Transient Ischemic Attack)  Goal: Optimal Coping  Outcome: Ongoing, Progressing     Problem: Cognitive Impairment (Stroke, Ischemic/Transient Ischemic Attack)  Goal: Optimal Cognitive Function  Outcome: Ongoing, Progressing     Problem: Functional Ability Impaired (Stroke, Ischemic/Transient Ischemic Attack)  Goal: Optimal Functional Ability  Outcome: Ongoing, Progressing     Problem: Adult Inpatient Plan of Care  Goal: Plan of Care Review  Outcome: Ongoing, Progressing  Goal: Patient-Specific Goal (Individualized)  Outcome: Ongoing, Progressing  Goal: Absence of Hospital-Acquired Illness or Injury  Outcome: Ongoing, Progressing  Goal: Optimal Comfort and Wellbeing  Outcome: Ongoing, Progressing  Goal: Readiness for Transition of Care  Outcome: Ongoing, Progressing

## 2023-01-03 NOTE — PROGRESS NOTES
[facility] - Neurosurgery ([facility])  Vascular Neurology  Comprehensive Stroke Center  Progress Note    Assessment/Plan:     * Thrombotic stroke involving right middle cerebral artery  89 year old female with past medical history of dementia, HTN, and DM who presented to ED with c/o slurred speech and weakness. CTA stroke multiphase negative for acute process, shows multifocal moderate-severe ICAD. MRI brain with acute infarctions in the R MCA territory. Suspect etiology at this time likely ICAD.    NAEO, neuro exam stable. Resting this morning after working with therapy, denies any complaints/concerns. Maintenance IVF for mild dehydration on labs this morning. Discharge to SNF pending.      Antithrombotics for secondary stroke prevention: Antiplatelets: Clopidogrel: 75 mg daily    Statins for secondary stroke prevention and hyperlipidemia, if present:   Statins: Atorvastatin- 40 mg daily    Aggressive risk factor modification: HTN, HLD, AAD, ? neoplasm     Rehab efforts: The patient has been evaluated by a stroke team provider and the therapy needs have been fully considered based off the presenting complaints and exam findings. The following therapy evaluations are needed: PT evaluate and treat, OT evaluate and treat, SLP evaluate and treat-Recommending SNF, on a mechanical soft nectar thick diet    Diagnostics ordered/pending: None     VTE prophylaxis: Heparin 5000 units SQ every 8 hours  Mechanical prophylaxis: Place SCDs    BP parameters: SBP <180      Moderate malnutrition  -Seen by Dietician.  Boost w/meals.  -Strict calorie count and monitor I/Os    Other hydronephrosis  -Noted on CTA chest obtained on 1/2/2023.  -Renal US with hydronephrosis (L >>> R) and non-obstructing nephrolithiasis of R kidney  -Urology consulted, appreciate the consult and recs  -CT Renal stone study obtained and revealed UPJO that Urology feels is likely chronic with no need for intervention at this time.    Mixed  "hyperlipidemia  Stroke risk factor  , goal <70  Continue atorvastatin 40mg daily    Abnormal CT scan, chest  -CTA Chest w/multiple bilatera nodular foci in the upper and lower lobes, the largest in the LLL measuring 2.1 cm.    -Follow up outpatient.  -Mild patchy bilateral ground-glass infiltrates, pneumonitis vs mild edema.  Single view CXR negative for acute findings.  Monitor.    Aneurysm of ascending aorta without rupture  -4.5 cm fusiform aneurysmal dilation of the ascending thoracic aorta noted on CTA Chest (PE study) obtained 1/2/2023.  -Follow up outpatient    Weakness  Due to stroke  Aggressive PT/OT/SLP  PT/OT recommend SNF, placement pending         12/30/2022 NAEO, neuro exam stable. Patient noted to be more lethargic this morning, repeat CTH ordered. Echo and therapy evals pending to complete stroke workup. Diet advanced to mechanical soft with nectar thick liquids.  12/31/2022 NAEON.  Neuro exam remains stable.  She remains lethargic.  Repeat CTH ordered yesterday was negative for any acute findings.  TTE w/mild aortic stenosis.  EF 65%.  Therapy rec SNF placement.  1/1/2023 Patient was restless and mildly agitated around 2030.  Was given melatonin and had improvement of symptoms.  This morning the patient is easily arousable.  She repeatedly states "I'm fine, nothing is wrong." "I want to go home.  My family will take care of me, they take good care of me."  01/02/2023 NAEO, delirium precautions in place and scheduled melatonin. Neuro exam stable, more alert and interactive this morning. States shes feeling better and more like herself. Medically ready for discharge, pending SNF placement.  01/03/2023 NAEO, neuro exam stable. Resting this morning after working with therapy, denies any complaints/concerns. Maintenance IVF for mild dehydration on labs this morning. Discharge to SNF pending.      STROKE DOCUMENTATION   Acute Stroke Times   Last Known Normal Date: 12/29/22  Last Known Normal Time: " 2130  Symptom Onset Date: 12/29/22  Unknown Symptom Onset Date: Unknown Date  Symptom Onset Time: 1000  Unknown Symptom Onset Time: Unknown Time  Stroke Team Called Date: 12/29/22  Stroke Team Called Time: 1640  Stroke Team Arrival Date: 12/29/22  Stroke Team Arrival Time: 1645  CT Interpretation Time: 1646  Thrombolytic Therapy Recommended: No  CTA Interpretation Time: 1650  Thrombectomy Recommended: No    NIH Scale:  1a. Level of Consciousness: 1-->Not alert, but arousable by minor stimulation to obey, answer, or respond  1b. LOC Questions: 1-->Answers one question correctly  1c. LOC Commands: 0-->Performs both tasks correctly  2. Best Gaze: 0-->Normal  3. Visual: 0-->No visual loss  4. Facial Palsy: 0-->Normal symmetrical movements  5a. Motor Arm, Left: 0-->No drift, limb holds 90 (or 45) degrees for full 10 secs  5b. Motor Arm, Right: 0-->No drift, limb holds 90 (or 45) degrees for full 10 secs  6a. Motor Leg, Left: 2-->Some effort against gravity, leg falls to bed by 5 secs, but has some effort against gravity  6b. Motor Leg, Right: 2-->Some effort against gravity, leg falls to bed by 5 secs, but has some effort against gravity  7. Limb Ataxia: 0-->Absent  8. Sensory: 0-->Normal, no sensory loss  9. Best Language: 0-->No aphasia, normal  10. Dysarthria: 0-->Normal  11. Extinction and Inattention (formerly Neglect): 0-->No abnormality  Total (NIH Stroke Scale): 6       Modified Madeline Score: 3  Seun Coma Scale:    ABCD2 Score:    [id number] Score:   HAS -BLED Score:   ICH Score:   Hunt & Katz Classification:      Hemorrhagic change of an Ischemic Stroke: Does this patient have an ischemic stroke with hemorrhagic changes? No     Neurologic Chief Complaint: R MCA stroke    Subjective:     Interval History: Patient is seen for follow-up neurological assessment and treatment recommendations:   PEPITO, neuro exam stable. Resting this morning after working with therapy, denies any complaints/concerns. Maintenance  IVF for mild dehydration on labs this morning. Discharge to SNF pending.    HPI, Past Medical, Family, and Social History remains the same as documented in the initial encounter.     Review of Systems   Unable to perform ROS: Dementia   Neurological:  Positive for speech difficulty and weakness.   Psychiatric/Behavioral:  Positive for confusion.    Scheduled Meds:   atorvastatin  40 mg Oral Daily    clopidogreL  75 mg Oral Daily    heparin (porcine)  5,000 Units Subcutaneous Q8H    melatonin  6 mg Oral Nightly     Continuous Infusions:   sodium chloride 0.9% 75 mL/hr at 01/03/23 0942    sodium chloride 0.9%       PRN Meds:acetaminophen, influenza 65up-adj, labetaloL, sodium chloride 0.9%, sodium chloride 0.9%    Objective:     Vital Signs (Most Recent):  Temp: 97.4 °F (36.3 °C) (01/03/23 0747)  Pulse: 73 (01/03/23 0753)  Resp: 18 (01/03/23 0747)  BP: 127/65 (01/03/23 0747)  SpO2: 97 % (01/03/23 0747)  BP Location: Left arm    Vital Signs Range (Last 24H):  Temp:  [97.4 °F (36.3 °C)-98.3 °F (36.8 °C)]   Pulse:  [59-96]   Resp:  [13-18]   BP: ()/(58-96)   SpO2:  [92 %-97 %]   BP Location: Left arm      Physical Exam  Vitals and nursing note reviewed.   Constitutional:       General: She is sleeping. She is not in acute distress.     Appearance: She is well-developed.   HENT:      Head: Normocephalic.      Nose: Nose normal.      Mouth/Throat:      Mouth: Mucous membranes are moist.   Eyes:      General:         Right eye: No discharge.         Left eye: No discharge.      Extraocular Movements: Extraocular movements intact.      Conjunctiva/sclera: Conjunctivae normal.   Cardiovascular:      Rate and Rhythm: Normal rate and regular rhythm.   Pulmonary:      Effort: Pulmonary effort is normal. No respiratory distress.   Abdominal:      General: There is no distension.      Palpations: Abdomen is soft.      Tenderness: There is no abdominal tenderness.   Musculoskeletal:      Cervical back: Normal range of  motion and neck supple.      Right lower leg: No edema.      Left lower leg: No edema.   Skin:     General: Skin is warm and dry.      Capillary Refill: Capillary refill takes less than 2 seconds.   Neurological:      Mental Status: She is easily aroused. She is disoriented.      Motor: Weakness present.       Neurological Exam:   LOC: sleeping, easily arouses  Attention Span: Good   Language: No aphasia  Articulation: No dysarthria  Orientation: Not oriented to place, Not oriented to time  Facial Movement (CN VII): Symmetric facial expression    Motor: Arm left  Normal 5/5  Leg left  Paresis: 4/5  Arm right  Normal 5/5  Leg right Paresis: 4/5  Sensation: Intact to light touch    Laboratory:  CMP: No results for input(s): GLUCOSE, CALCIUM, ALBUMIN, PROT, NA, K, CO2, CL, BUN, CREATININE, ALKPHOS, ALT, AST, BILITOT in the last 24 hours.  CBC:   Recent Labs   Lab 01/02/23  0955   WBC 9.82   RBC 6.55*   HGB 18.0*   HCT 58.2*      MCV 89   MCH 27.5   MCHC 30.9*       Coagulation:   Recent Labs   Lab 12/30/22  0341   INR 1.0   APTT 31.7         Diagnostic Results     Brain Imaging   CTH 12/30/2022 Impression: CT head appears unchanged from prior.  Areas of acute infarction noted on MRI are not appreciated on routine CT, due to differences in technique.  No evidence of hemorrhagic conversion.  Chronic microvascular ischemic change.  Moderate generalized cerebral volume loss.     MRI brain without contrast 12/29/22  Study is limited by motion artifact.   Scattered small acute infarctions throughout the right cerebral hemisphere, largest within the posterior right parietal lobe.        Vessel Imaging   CTA stroke multiphase 12/29/22  1. CT head shows no acute intracranial abnormalities.   2. No large vessel occlusion.  Short segment of high-grade stenosis of the distal M1 segment right MCA.  There is flow within the M2 segments of the right MCA.  Moderate to high-grade stenosis in the proximal M2 segment superior  division left MCA.  Multifocal irregularity of the bilateral anterior, middle, and posterior cerebral arteries as well as the intracranial vertebral arteries, likely atherosclerotic disease.   3. Calcific atherosclerosis at the bilateral carotid bifurcations with approximately 50% stenosis on the right.  Calcified plaque in the intracranial internal carotid arteries with suspected area of moderate stenosis on the right.   4. Thrombus within the left pulmonary artery.  Eccentric location suggestive of chronic thrombus, although no priors available for comparison.  Consider dedicated CTA PE protocol when clinically stable.   5. Chronic microvascular ischemic change.   6. Several irregular likely endobronchial opacities in the partially visualized right lung as detailed above, likely mucous plugging.   7. Additional findings as above.        Cardiac Imaging   TTE 12/30/2022   The left ventricle is normal in size with concentric remodeling and normal systolic function.   Normal left ventricular diastolic function.   Normal right ventricular size with normal right ventricular systolic function.   There is mild aortic valve stenosis. AV not seen well in SAX. by hemodynamic assessment this appears to be mild AS.   Aortic valve area is 1.80 cm2; peak velocity is 2.24 m/s; mean gradient is 12 mmHg.   Normal central venous pressure (3 mmHg).   The estimated ejection fraction is 65%.      [employer]  Comprehensive Stroke Center  Department of Vascular Neurology   [facility] Neurosurgery [facility])

## 2023-01-03 NOTE — PROGRESS NOTES
[facility] - Neurosurgery ([facility])  Wound Care    Patient Name:  Franny Arnold   MRN:  [id number]  Date: 1/3/2023  Diagnosis: Thrombotic stroke involving right middle cerebral artery    History:     Past Medical History:   Diagnosis Date    Basal cell carcinoma     Dementia        Social History     Socioeconomic History    Marital status:    Tobacco Use    Smoking status: Never    Smokeless tobacco: Never   Substance and Sexual Activity    Alcohol use: Not Currently    Drug use: Never    Sexual activity: Not Currently       Precautions:     Allergies as of 12/29/2022    (No Known Allergies)       WO Assessment Details/Treatment   Patient seen for wound care consultation.   Reviewed chart for this encounter.   See Flow Sheet for findings.  Cleaned wound with vashe to assist in infection prevention.      RECOMMENDATIONS  Clean right and left beast folds with vashe to assist in infection prevention.  Apply antifungal powder possibly yeast      Nursing to continue care.                  01/03/23 1152   WOCN Assessment   WOCN Total Time (mins) 30   Visit Date 01/03/23   Visit Time 1152   Consult Type New   WOCN Speciality Wound   Intervention orders;assessed;changed;applied;chart review;coordination of care   Teaching on-going        Altered Skin Integrity 01/01/23 0614 Right lateral;lower Breast Other (comment)   Date First Assessed/Time First Assessed: 01/01/23 0614   Altered Skin Integrity Present on Admission: yes  Side: Right  Orientation: lateral;lower  Location: Breast  Is this injury device related?: No  Primary Wound Type: Other (comment)   Wound Image    Dressing Appearance Open to air   Drainage Amount None   Appearance Pink;Moist  (linear, multiple, superficial,)   Periwound Area Intact;Dry;Redness;Other (see comments)  (adherent dry scabs)   Wound Length (cm) 0.3 cm   Wound Width (cm) 0.3 cm   Wound Depth (cm) 0.1 cm   Wound Volume (cm^3) 0.009 cm^3   Wound Surface Area (cm^2) 0.09 cm^2   Care Cleansed  with:;Antimicrobial agent        Altered Skin Integrity 01/03/23 1152 Left lateral Breast Other (comment)   Date First Assessed/Time First Assessed: 01/03/23 1152   Side: Left  Orientation: (c) lateral  Location: (c) Breast  Primary Wound Type: Other (comment)   Wound Image    Dressing Appearance Open to air   Drainage Amount None   Appearance [place];Moist   Periwound Area Intact;Dry;Redness  (dry adherent scabs)   Wound Length (cm) 0.3 cm   Wound Width (cm) 0.1 cm   Wound Depth (cm) 0.1 cm   Wound Volume (cm^3) 0.003 cm^3   Wound Surface Area (cm^2) 0.03 cm^2   Care Cleansed with:;Antimicrobial agent         01/03/2023

## 2023-01-03 NOTE — PT/OT/SLP PROGRESS
Occupational Therapy   Treatment    Name: Franny Arnold  MRN: [id number]  Admitting Diagnosis:  Thrombotic stroke involving right middle cerebral artery       Recommendations:     Discharge Recommendations: nursing facility, skilled  Discharge Equipment Recommendations:   (TBD)  Barriers to discharge:  Other (Comment) (requires increased assist)    Assessment:     Franny Arnold is a 89 y.o. female with a medical diagnosis of Thrombotic stroke involving right middle cerebral artery.  She presents with deficits in functional mobility, endurance and cognition. Pt. Demonstrated improvements on this date with transfers and sitting balance EOB. Pt. Alert throughout session and following commands. Patient would benefit from continued OT services to maximize level of safety and independence with self-care tasks.    Performance deficits affecting function are weakness, impaired endurance, impaired self care skills, impaired functional mobility, gait instability, impaired balance, impaired cognition, decreased coordination, decreased lower extremity function, decreased upper extremity function.     Rehab Prognosis:  Good; patient would benefit from acute skilled OT services to address these deficits and reach maximum level of function.       Plan:     Patient to be seen 3 x/week to address the above listed problems via self-care/home management, therapeutic activities, therapeutic exercises, neuromuscular re-education, cognitive retraining  Plan of Care Expires: 01/28/23  Plan of Care Reviewed with: patient    Subjective   Pt. Agreeable to sitting up in chair/therapy  Pain/Comfort:  Pain Rating 1: 0/10  Pain Rating Post-Intervention 1: 0/10    Objective:     Communicated with: nurse prior to session.  Patient found supine with telemetry, SCD, PureWick, bed alarm (telesitter) upon OT entry to room.    General Precautions: Standard, aspiration, nectar thick, fall    Orthopedic Precautions:N/A  Braces: N/A  Respiratory Status: Room  air     Occupational Performance:     Bed Mobility:    Patient completed Supine to Sit with moderate assistance     Functional Mobility/Transfers:  Patient completed Sit <> Stand Transfer with minimum assistance  with  rolling walker   Patient completed Bed <> Chair Transfer using Stand Pivot technique with moderate assistance with rolling walker  Functional Mobility: not tested    Activities of Daily Living:  Feeding:  stand by assistance to feed self breakfast in bedside chair (just a few bites)   Upper Body Dressing: maximal assistance to don gown like robe seated EOB  Lower Body Dressing: total assistance to don socks      AMPAC 6 Click ADL: 14    Treatment & Education:  Pt. Educated on importance of OOB/therapy  Pt. Was able to sit EOB with SBA  Pt. Followed all simple commands      Patient left up in chair with all lines intact, call button in reach, chair alarm on, and nurse and telesitter  notified    GOALS:   Multidisciplinary Problems       Occupational Therapy Goals          Problem: Occupational Therapy    Goal Priority Disciplines Outcome Interventions   Occupational Therapy Goal     OT, PT/OT Ongoing, Progressing    Description: Goals to be met by: 1/13/23     Patient will increase functional independence with ADLs by performing:    UE Dressing with Stand-by Assistance.  LE Dressing with Minimal Assistance.  Grooming while seated with Set-up Assistance.  Toileting from bedside commode with Moderate Assistance for hygiene and clothing management.   Toilet transfer to bedside commode with Moderate Assistance.                         Time Tracking:     OT Date of Treatment: 01/03/23  OT Start Time: 0814  OT Stop Time: 0837  OT Total Time (min): 23 min    Billable Minutes:Self Care/Home Management 23    OT/MALIK: OT          1/3/2023

## 2023-01-03 NOTE — ASSESSMENT & PLAN NOTE
89 year old female with past medical history of dementia, HTN, and DM who presented to ED with c/o slurred speech and weakness. CTA stroke multiphase negative for acute process, shows multifocal moderate-severe ICAD. MRI brain with acute infarctions in the R MCA territory. Suspect etiology at this time likely ICAD.    NAEO, neuro exam stable. Resting this morning after working with therapy, denies any complaints/concerns. Maintenance IVF for mild dehydration on labs this morning. Discharge to SNF pending.      Antithrombotics for secondary stroke prevention: Antiplatelets: Clopidogrel: 75 mg daily    Statins for secondary stroke prevention and hyperlipidemia, if present:   Statins: Atorvastatin- 40 mg daily    Aggressive risk factor modification: HTN, HLD, AAD, ? neoplasm     Rehab efforts: The patient has been evaluated by a stroke team provider and the therapy needs have been fully considered based off the presenting complaints and exam findings. The following therapy evaluations are needed: PT evaluate and treat, OT evaluate and treat, SLP evaluate and treat-Recommending SNF, on a mechanical soft nectar thick diet    Diagnostics ordered/pending: None     VTE prophylaxis: Heparin 5000 units SQ every 8 hours  Mechanical prophylaxis: Place SCDs    BP parameters: SBP <180

## 2023-01-04 VITALS
TEMPERATURE: 97 F | HEART RATE: 109 BPM | RESPIRATION RATE: 18 BRPM | DIASTOLIC BLOOD PRESSURE: 98 MMHG | BODY MASS INDEX: 18.21 KG/M2 | OXYGEN SATURATION: 92 % | HEIGHT: 64 IN | WEIGHT: 106.63 LBS | SYSTOLIC BLOOD PRESSURE: 171 MMHG

## 2023-01-04 LAB
ALBUMIN SERPL BCP-MCNC: 3.2 G/DL (ref 3.5–5.2)
ALP SERPL-CCNC: 65 U/L (ref 55–135)
ALT SERPL W/O P-5'-P-CCNC: 19 U/L (ref 10–44)
ANION GAP SERPL CALC-SCNC: 7 MMOL/L (ref 8–16)
AST SERPL-CCNC: 21 U/L (ref 10–40)
BASOPHILS # BLD AUTO: 0.03 K/UL (ref 0–0.2)
BASOPHILS NFR BLD: 0.4 % (ref 0–1.9)
BILIRUB SERPL-MCNC: 0.8 MG/DL (ref 0.1–1)
BUN SERPL-MCNC: 20 MG/DL (ref 10–30)
CALCIUM SERPL-MCNC: 9 MG/DL (ref 8.7–10.5)
CHLORIDE SERPL-SCNC: 112 MMOL/L (ref 95–110)
CO2 SERPL-SCNC: 22 MMOL/L (ref 23–29)
CREAT SERPL-MCNC: 1.1 MG/DL (ref 0.5–1.4)
DIFFERENTIAL METHOD: ABNORMAL
EOSINOPHIL # BLD AUTO: 0.1 K/UL (ref 0–0.5)
EOSINOPHIL NFR BLD: 1.5 % (ref 0–8)
ERYTHROCYTE [DISTWIDTH] IN BLOOD BY AUTOMATED COUNT: 14.6 % (ref 11.5–14.5)
EST. GFR  (NO RACE VARIABLE): 45.4 ML/MIN/1.73 M^2
GLUCOSE SERPL-MCNC: 79 MG/DL (ref 70–110)
HCT VFR BLD AUTO: 51.3 % (ref 37–48.5)
HGB BLD-MCNC: 16 G/DL (ref 12–16)
IMM GRANULOCYTES # BLD AUTO: 0.08 K/UL (ref 0–0.04)
IMM GRANULOCYTES NFR BLD AUTO: 1 % (ref 0–0.5)
LYMPHOCYTES # BLD AUTO: 2.7 K/UL (ref 1–4.8)
LYMPHOCYTES NFR BLD: 31.5 % (ref 18–48)
MCH RBC QN AUTO: 28.3 PG (ref 27–31)
MCHC RBC AUTO-ENTMCNC: 31.2 G/DL (ref 32–36)
MCV RBC AUTO: 91 FL (ref 82–98)
MONOCYTES # BLD AUTO: 0.7 K/UL (ref 0.3–1)
MONOCYTES NFR BLD: 7.7 % (ref 4–15)
NEUTROPHILS # BLD AUTO: 4.9 K/UL (ref 1.8–7.7)
NEUTROPHILS NFR BLD: 57.9 % (ref 38–73)
NRBC BLD-RTO: 0 /100 WBC
PLATELET # BLD AUTO: 189 K/UL (ref 150–450)
PMV BLD AUTO: 9.5 FL (ref 9.2–12.9)
POTASSIUM SERPL-SCNC: 4.3 MMOL/L (ref 3.5–5.1)
PROT SERPL-MCNC: 5.9 G/DL (ref 6–8.4)
RBC # BLD AUTO: 5.65 M/UL (ref 4–5.4)
SODIUM SERPL-SCNC: 141 MMOL/L (ref 136–145)
WBC # BLD AUTO: 8.41 K/UL (ref 3.9–12.7)

## 2023-01-04 PROCEDURE — 51798 US URINE CAPACITY MEASURE: CPT

## 2023-01-04 PROCEDURE — 25000003 PHARM REV CODE 250: Performed by: NURSE PRACTITIONER

## 2023-01-04 PROCEDURE — 36415 COLL VENOUS BLD VENIPUNCTURE: CPT | Performed by: NURSE PRACTITIONER

## 2023-01-04 PROCEDURE — 92526 ORAL FUNCTION THERAPY: CPT

## 2023-01-04 PROCEDURE — 94761 N-INVAS EAR/PLS OXIMETRY MLT: CPT

## 2023-01-04 PROCEDURE — 99238 HOSP IP/OBS DSCHRG MGMT 30/<: CPT | Mod: ,,, | Performed by: PSYCHIATRY & NEUROLOGY

## 2023-01-04 PROCEDURE — 85025 COMPLETE CBC W/AUTO DIFF WBC: CPT | Performed by: NURSE PRACTITIONER

## 2023-01-04 PROCEDURE — 80053 COMPREHEN METABOLIC PANEL: CPT | Performed by: NURSE PRACTITIONER

## 2023-01-04 PROCEDURE — 99238 PR HOSPITAL DISCHARGE DAY,<30 MIN: ICD-10-PCS | Mod: ,,, | Performed by: PSYCHIATRY & NEUROLOGY

## 2023-01-04 PROCEDURE — 63600175 PHARM REV CODE 636 W HCPCS: Performed by: NURSE PRACTITIONER

## 2023-01-04 RX ORDER — ATORVASTATIN CALCIUM 40 MG/1
40 TABLET, FILM COATED ORAL DAILY
Qty: 90 TABLET | Refills: 3
Start: 2023-01-04 | End: 2024-01-04

## 2023-01-04 RX ORDER — SODIUM CHLORIDE 9 MG/ML
INJECTION, SOLUTION INTRAVENOUS CONTINUOUS
Status: DISCONTINUED | OUTPATIENT
Start: 2023-01-04 | End: 2023-01-04 | Stop reason: HOSPADM

## 2023-01-04 RX ORDER — MICONAZOLE NITRATE 2 %
POWDER (GRAM) TOPICAL 2 TIMES DAILY
Refills: 0
Start: 2023-01-04

## 2023-01-04 RX ORDER — TALC
6 POWDER (GRAM) TOPICAL NIGHTLY
Refills: 0
Start: 2023-01-04

## 2023-01-04 RX ORDER — CLOPIDOGREL BISULFATE 75 MG/1
75 TABLET ORAL DAILY
Qty: 30 TABLET | Refills: 11
Start: 2023-01-04 | End: 2024-01-04

## 2023-01-04 RX ADMIN — HEPARIN SODIUM 5000 UNITS: 5000 INJECTION INTRAVENOUS; SUBCUTANEOUS at 05:01

## 2023-01-04 RX ADMIN — SODIUM CHLORIDE: 9 INJECTION, SOLUTION INTRAVENOUS at 12:01

## 2023-01-04 RX ADMIN — HEPARIN SODIUM 5000 UNITS: 5000 INJECTION INTRAVENOUS; SUBCUTANEOUS at 01:01

## 2023-01-04 RX ADMIN — CLOPIDOGREL BISULFATE 75 MG: 75 TABLET ORAL at 11:01

## 2023-01-04 RX ADMIN — ATORVASTATIN CALCIUM 40 MG: 20 TABLET, FILM COATED ORAL at 11:01

## 2023-01-04 RX ADMIN — MICONAZOLE NITRATE 2 % TOPICAL POWDER: at 11:01

## 2023-01-04 RX ADMIN — SODIUM CHLORIDE: 9 INJECTION, SOLUTION INTRAVENOUS at 11:01

## 2023-01-04 NOTE — PLAN OF CARE
Problem: Adjustment to Illness (Stroke, Ischemic/Transient Ischemic Attack)  Goal: Optimal Coping  Outcome: Ongoing, Progressing     Problem: Bowel Elimination Impaired (Stroke, Ischemic/Transient Ischemic Attack)  Goal: Effective Bowel Elimination  Outcome: Ongoing, Progressing     Problem: Cerebral Tissue Perfusion (Stroke, Ischemic/Transient Ischemic Attack)  Goal: Optimal Cerebral Tissue Perfusion  Outcome: Ongoing, Progressing     Problem: Communication Impairment (Stroke, Ischemic/Transient Ischemic Attack)  Goal: Improved Communication Skills  Outcome: Ongoing, Progressing     Problem: Functional Ability Impaired (Stroke, Ischemic/Transient Ischemic Attack)  Goal: Optimal Functional Ability  Outcome: Ongoing, Progressing     Problem: Urinary Elimination Impaired (Stroke, Ischemic/Transient Ischemic Attack)  Goal: Effective Urinary Elimination  Outcome: Ongoing, Progressing     Problem: Fall Injury Risk  Goal: Absence of Fall and Fall-Related Injury  Outcome: Ongoing, Progressing     Problem: Adult Inpatient Plan of Care  Goal: Plan of Care Review  Outcome: Ongoing, Progressing  Flowsheets (Taken 1/4/2023 0107)  Plan of Care Reviewed With: patient  Goal: Optimal Comfort and Wellbeing  Outcome: Ongoing, Progressing     Problem: Skin Injury Risk Increased  Goal: Skin Health and Integrity  Outcome: Ongoing, Progressing     Problem: Impaired Wound Healing  Goal: Optimal Wound Healing  Outcome: Ongoing, Progressing     Problem: Swallowing Impairment (Stroke, Ischemic/Transient Ischemic Attack)  Goal: Optimal Eating and Swallowing without Aspiration  Outcome: Met         POC updated and reviewed with the patient at the bedside. Questions regarding POC were encouraged and addressed. VSS, see flowsheets. Patient is AOX self at this time. Tele removed by patient, see previous note for details. IVF continued as ordered, see I/Os flowsheet for details. Moisture barrier applied under patient's breast for altered skin  integrity, see MAR for administration details. Fall and safety precautions maintained, no signs of injury noted during shift. Patient was repositioned for comfort with bed locked in low position, side rails up x 4, bed alarm set, with call light within reach. Instructed patient to call staff for mobility, verbalized understanding. Telesitter to remain at bedside. Stroke book and education reviewed at the bedside, see education flowsheets for details. No acute signs of distress noted at this time.

## 2023-01-04 NOTE — ASSESSMENT & PLAN NOTE
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  -Follow up outpatient

## 2023-01-04 NOTE — SUBJECTIVE & OBJECTIVE
Neurologic Chief Complaint: R MCA stroke    Subjective:     Interval History: Patient is seen for follow-up neurological assessment and treatment recommendations:   NAEO, neuro exam unchanged. Patient more alert and responsive this morning. SLP advanced liquids to thin today. Medically ready for discharge, anticipate dispo to SNF today    HPI, Past Medical, Family, and Social History remains the same as documented in the initial encounter.     Review of Systems   Unable to perform ROS: Dementia   Neurological:  Positive for speech difficulty and weakness.   Psychiatric/Behavioral:  Positive for confusion.    Scheduled Meds:   atorvastatin  40 mg Oral Daily    clopidogreL  75 mg Oral Daily    heparin (porcine)  5,000 Units Subcutaneous Q8H    melatonin  6 mg Oral Nightly    miconazole NITRATE 2 %   Topical (Top) BID     Continuous Infusions:   sodium chloride 0.9% 75 mL/hr at 01/04/23 0045    sodium chloride 0.9%       PRN Meds:acetaminophen, influenza 65up-adj, labetaloL, sodium chloride 0.9%, sodium chloride 0.9%    Objective:     Vital Signs (Most Recent):  Temp: 97.5 °F (36.4 °C) (01/04/23 0427)  Pulse: 68 (01/04/23 0758)  Resp: 18 (01/04/23 0758)  BP: 136/71 (01/04/23 0758)  SpO2: (!) 94 % (01/04/23 0758)  BP Location: Right arm    Vital Signs Range (Last 24H):  Temp:  [97.5 °F (36.4 °C)-97.9 °F (36.6 °C)]   Pulse:  [57-80]   Resp:  [16-19]   BP: (117-136)/(58-74)   SpO2:  [94 %-95 %]   BP Location: Right arm      Physical Exam  Vitals and nursing note reviewed.   Constitutional:       General: She is not in acute distress.     Appearance: She is well-developed.   HENT:      Head: Normocephalic.      Nose: Nose normal.      Mouth/Throat:      Mouth: Mucous membranes are moist.   Eyes:      General:         Right eye: No discharge.         Left eye: No discharge.      Extraocular Movements: Extraocular movements intact.      Conjunctiva/sclera: Conjunctivae normal.   Cardiovascular:      Rate and Rhythm: Normal  rate and regular rhythm.   Pulmonary:      Effort: Pulmonary effort is normal. No respiratory distress.   Abdominal:      General: There is no distension.      Palpations: Abdomen is soft.      Tenderness: There is no abdominal tenderness.   Musculoskeletal:      Cervical back: Normal range of motion and neck supple.      Right lower leg: No edema.      Left lower leg: No edema.   Skin:     General: Skin is warm and dry.      Capillary Refill: Capillary refill takes less than 2 seconds.   Neurological:      Mental Status: She is alert. She is disoriented.      Motor: Weakness present.       Neurological Exam:   LOC: alert  Attention Span: Good   Language: No aphasia  Articulation: No dysarthria  Orientation: Not oriented to place, Not oriented to time  Facial Movement (CN VII): Symmetric facial expression    Motor: Arm left  Normal 5/5  Leg left  Paresis: 4/5  Arm right  Normal 5/5  Leg right Paresis: 4/5  Sensation: Intact to light touch    Laboratory:  CMP:   Recent Labs   Lab 01/04/23  0534   CALCIUM 9.0   ALBUMIN 3.2*   PROT 5.9*      K 4.3   CO2 22*   *   BUN 20   CREATININE 1.1   ALKPHOS 65   ALT 19   AST 21   BILITOT 0.8     CBC:   Recent Labs   Lab 01/04/23  0534   WBC 8.41   RBC 5.65*   HGB 16.0   HCT 51.3*      MCV 91   MCH 28.3   MCHC 31.2*       Coagulation:   Recent Labs   Lab 12/30/22  0341   INR 1.0   APTT 31.7         Diagnostic Results     Brain Imaging   [facility] 12/30/2022 Impression: CT head appears unchanged from prior.  Areas of acute infarction noted on MRI are not appreciated on routine CT, due to differences in technique.  No evidence of hemorrhagic conversion.  Chronic microvascular ischemic change.  Moderate generalized cerebral volume loss.     MRI brain without contrast 12/29/22  Study is limited by motion artifact.   Scattered small acute infarctions throughout the right cerebral hemisphere, largest within the posterior right parietal lobe.        Vessel Imaging   CTA  stroke multiphase 12/29/22  1. CT head shows no acute intracranial abnormalities.   2. No large vessel occlusion.  Short segment of high-grade stenosis of the distal M1 segment right MCA.  There is flow within the M2 segments of the right MCA.  Moderate to high-grade stenosis in the proximal M2 segment superior division left MCA.  Multifocal irregularity of the bilateral anterior, middle, and posterior cerebral arteries as well as the intracranial vertebral arteries, likely atherosclerotic disease.   3. Calcific atherosclerosis at the bilateral carotid bifurcations with approximately 50% stenosis on the right.  Calcified plaque in the intracranial internal carotid arteries with suspected area of moderate stenosis on the right.   4. Thrombus within the left pulmonary artery.  Eccentric location suggestive of chronic thrombus, although no priors available for comparison.  Consider dedicated CTA PE protocol when clinically stable.   5. Chronic microvascular ischemic change.   6. Several irregular likely endobronchial opacities in the partially visualized right lung as detailed above, likely mucous plugging.   7. Additional findings as above.        Cardiac Imaging   TTE 12/30/2022  The left ventricle is normal in size with concentric remodeling and normal systolic function.  Normal left ventricular diastolic function.  Normal right ventricular size with normal right ventricular systolic function.  There is mild aortic valve stenosis. AV not seen well in SAX. by hemodynamic assessment this appears to be mild AS.  Aortic valve area is 1.80 cm2; peak velocity is 2.24 m/s; mean gradient is 12 mmHg.  Normal central venous pressure (3 mmHg).  The estimated ejection fraction is 65%.

## 2023-01-04 NOTE — DISCHARGE SUMMARY
Amol Colon - Neurosurgery ([facility])  Vascular Neurology  Comprehensive Stroke Center  Discharge Summary     Summary:     Admit Date: 12/29/2022  4:14 PM    Discharge Date and Time: 1/4/2023  8:29 PM    Attending Physician: Bry Cortés DO     Discharge Provider: FRIDA Guidry    History of Present Illness: 89 year old female with past medical history of dementia, HTN, and DM who presented to ED with c/o slurred speech and weakness. Caregiver reports last known normal was last night at 9:30pm when she went to sleep. Then she woke up this morning around 9am and they noticed that she had difficulty using her walker and slurred speech. They called the PCP and were told to come to the ED. Caregiver reports her speech has improved since this morning. On exam patient with no focal weakness, no vision deficit, no sensation deficit, delayed speech, and no facial droop. CTH showed no acute intracranial pathology. CTA showed no LVO. Patient not a candidate for tenecteplase due to out of window. Not a candidate for thrombectomy no LVO  seen on imaging.     Recommend MRI to rule out ischemia. If MRI is negative recommend alternative workup.     Vascular Neurology will follow up with imaging.       Hospital Course (synopsis of major diagnoses, care, treatment, and services provided during the course of the hospital stay): Ms. Franny Arnold was admitted to Vascular Neurology service for R CMA stroke, not candidate for acute intervention with thrombolytics/thrombectomy. Suspect etiology to be large artery atherosclerosis at this time. Hospital course complicated by waxing/waning confusion felt likely dementia exacerbated by acute delirium, delirium precautions in place. During hospital stay evaluations completed by PT/OT/SLP services, dispo recommendations provided and patient discharged SNF for continued therapy. Inpatient acute stroke workup completed and patient stable for discharge. Please see all appropriate  medication changes, imaging results, and necessary follow-up below.      Goals of Care Treatment Preferences:  Code Status: Full Code    Living Will: Yes              Stroke Etiology: Ischemic Large Artery Atherosclerosis/Atheroembolic Intracranial    STROKE DOCUMENTATION   Acute Stroke Times   Last Known Normal Date: 12/29/22  Last Known Normal Time: 2130  Symptom Onset Date: 12/29/22  Unknown Symptom Onset Date: Unknown Date  Symptom Onset Time: 1000  Unknown Symptom Onset Time: Unknown Time  Stroke Team Called Date: 12/29/22  Stroke Team Called Time: 1640  Stroke Team Arrival Date: 12/29/22  Stroke Team Arrival Time: 1645  CT Interpretation Time: 1646  Thrombolytic Therapy Recommended: No  CTA Interpretation Time: 1650  Thrombectomy Recommended: No     NIH Scale:  1a. Level of Consciousness: 0-->Alert, keenly responsive  1b. LOC Questions: 1-->Answers one question correctly  1c. LOC Commands: 0-->Performs both tasks correctly  2. Best Gaze: 0-->Normal  3. Visual: 0-->No visual loss  4. Facial Palsy: 0-->Normal symmetrical movements  5a. Motor Arm, Left: 0-->No drift, limb holds 90 (or 45) degrees for full 10 secs  5b. Motor Arm, Right: 0-->No drift, limb holds 90 (or 45) degrees for full 10 secs  6a. Motor Leg, Left: 1-->Drift, leg falls by the end of the 5-sec period but does not hit bed  6b. Motor Leg, Right: 1-->Drift, leg falls by the end of the 5-sec period but does not hit bed  7. Limb Ataxia: 0-->Absent  8. Sensory: 0-->Normal, no sensory loss  9. Best Language: 0-->No aphasia, normal  10. Dysarthria: 0-->Normal  11. Extinction and Inattention (formerly Neglect): 0-->No abnormality  Total (NIH Stroke Scale): 3        Modified Madeline Score: 4  Seun Coma Scale:    ABCD2 Score:    [id number] Score:   HAS -BLED Score:   ICH Score:   Hunt & Katz Classification:       Assessment/Plan:     Diagnostic Results:    Brain Imaging   [facility] 12/30/2022 Impression: CT head appears unchanged from prior.  Areas of acute  infarction noted on MRI are not appreciated on routine CT, due to differences in technique.  No evidence of hemorrhagic conversion.  Chronic microvascular ischemic change.  Moderate generalized cerebral volume loss.     MRI brain without contrast 12/29/22  Study is limited by motion artifact.   Scattered small acute infarctions throughout the right cerebral hemisphere, largest within the posterior right parietal lobe.         Vessel Imaging   CTA stroke multiphase 12/29/22  1. CT head shows no acute intracranial abnormalities.   2. No large vessel occlusion.  Short segment of high-grade stenosis of the distal M1 segment right MCA.  There is flow within the M2 segments of the right MCA.  Moderate to high-grade stenosis in the proximal M2 segment superior division left MCA.  Multifocal irregularity of the bilateral anterior, middle, and posterior cerebral arteries as well as the intracranial vertebral arteries, likely atherosclerotic disease.   3. Calcific atherosclerosis at the bilateral carotid bifurcations with approximately 50% stenosis on the right.  Calcified plaque in the intracranial internal carotid arteries with suspected area of moderate stenosis on the right.   4. Thrombus within the left pulmonary artery.  Eccentric location suggestive of chronic thrombus, although no priors available for comparison.  Consider dedicated CTA PE protocol when clinically stable.   5. Chronic microvascular ischemic change.   6. Several irregular likely endobronchial opacities in the partially visualized right lung as detailed above, likely mucous plugging.   7. Additional findings as above.         Cardiac Imaging   TTE 12/30/2022  The left ventricle is normal in size with concentric remodeling and normal systolic function.  Normal left ventricular diastolic function.  Normal right ventricular size with normal right ventricular systolic function.  There is mild aortic valve stenosis. AV not seen well in SAX. by hemodynamic  assessment this appears to be mild AS.  Aortic valve area is 1.80 cm2; peak velocity is 2.24 m/s; mean gradient is 12 mmHg.  Normal central venous pressure (3 mmHg).  The estimated ejection fraction is 65%.      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  Service: Vascular Neurology      Recommendations:     Post-discharge complication risks: Falls    Stroke Education given to: patient, family, and caregiver    Follow-up in Stroke Clinic in 4-6 weeks.     Discharge Plan:  Antithrombotics: Clopidogrel 75mg  Statin: Atorvastatin 40mg  Aggresive risk factor modification:  Hypertension  Diabetes  High Cholesterol    Follow Up:      Patient Instructions:      Ambulatory referral/consult to Vascular Neurology   Standing Status: Future   Referral Priority: Routine Referral Type: Consultation   Referral Reason: Specialty Services Required   Requested Specialty: Vascular Neurology   Number of Visits Requested: 1     Call 911 for any of the following:   Order Comments: Call 911  right away if any of the following warning signs come on suddenly, even if the symptoms only last for a few minutes. With stroke, timing is very important.   - Warning Signs of Stroke:  - Weakness: You may feel a sudden weakness, tingling or loss of feeling on one side of your face or body.  - Vision  Problems: You may have sudden double vision or trouble seeing in one or both eyes.  - Speech Problems: You may have sudden trouble talking, slured speech, or problems understanding others.  - Headache: You may have sudden, severe headache.  - Movement Problems: You may experience dizziness, a feeling of spinning, a loss of balance, a feeling of falling or blackouts.     Activity as tolerated       Medications:  Reconciled Home Medications:      Medication List        START taking these medications      atorvastatin 40 MG tablet  Commonly known as: LIPITOR  Take 1 tablet (40 mg total) by mouth once daily.     clopidogreL 75 mg tablet  Commonly known as: PLAVIX  Take 1 tablet (75 mg total) by mouth once daily.     melatonin 3 mg tablet  Commonly known as: MELATIN  Take 2 tablets (6 mg total) by mouth nightly.     miconazole NITRATE 2 % 2 % top powder  Commonly known as: MICOTIN  Apply topically 2 (two) times daily.            CONTINUE taking these medications      acetaminophen 325 MG tablet  Commonly known as: TYLENOL  Take 2 tablets (650 mg total) by mouth every 6 (six) hours as needed for Pain.     cholecalciferol (vitamin D3) 50 mcg (2,000 unit) Tab  Commonly known as: VITAMIN D3  Take 1 tablet (2,000 Units total) by mouth once daily.     methocarbamoL 500 MG Tab  Commonly known as: ROBAXIN  Take 500 mg by mouth 4 (four) times daily.     polyethylene glycol 17 gram Pwpk  Commonly known as: GLYCOLAX  Take 17 g by mouth 2 (two) times daily as needed (if no BM x 2 days).              FRIDA Guidry  Comprehensive Stroke Center  Department of Vascular Neurology   [facility] Neurosurgery [facility]

## 2023-01-04 NOTE — PLAN OF CARE
Problem: Adjustment to Illness (Stroke, Ischemic/Transient Ischemic Attack)  Goal: Optimal Coping  Outcome: Met     Problem: Adult Inpatient Plan of Care  Goal: Plan of Care Review  Outcome: Met  Goal: Patient-Specific Goal (Individualized)  Outcome: Met  Goal: Absence of Hospital-Acquired Illness or Injury  Outcome: Met  Goal: Optimal Comfort and Wellbeing  Outcome: Met  Goal: Readiness for Transition of Care  Outcome: Met

## 2023-01-04 NOTE — ASSESSMENT & PLAN NOTE
89 year old female with past medical history of dementia, HTN, and DM who presented to ED with c/o slurred speech and weakness. CTA stroke multiphase negative for acute process, shows multifocal moderate-severe ICAD. MRI brain with acute infarctions in the R MCA territory. Echo unremarkable. Suspect etiology at this time likely ICAD.    NAEO, neuro exam unchanged. Patient more alert and responsive this morning. SLP advanced liquids to thin today. Medically ready for discharge, anticipate dispo to SNF today      Antithrombotics for secondary stroke prevention: Antiplatelets: Clopidogrel: 75 mg daily    Statins for secondary stroke prevention and hyperlipidemia, if present:   Statins: Atorvastatin- 40 mg daily    Aggressive risk factor modification: HTN, HLD, AAD, ? neoplasm     Rehab efforts: The patient has been evaluated by a stroke team provider and the therapy needs have been fully considered based off the presenting complaints and exam findings. The following therapy evaluations are needed: PT evaluate and treat, OT evaluate and treat, SLP evaluate and treat-Recommending SNF, mechanical soft with thin liquids diet    Diagnostics ordered/pending: None     VTE prophylaxis: Heparin 5000 units SQ every 8 hours  Mechanical prophylaxis: Place SCDs    BP parameters: SBP <180

## 2023-01-04 NOTE — PT/OT/SLP PROGRESS
Speech Language Pathology Treatment    Patient Name:  Franny Arnold   MRN:  [id number]  Admitting Diagnosis: Thrombotic stroke involving right middle cerebral artery    Recommendations:                 General Recommendations:  Dysphagia therapy  Diet recommendations:  Mechanical soft, Liquid Diet Level: Thin   Aspiration Precautions: Assistance with meals, Feed only when awake/alert, HOB to 90 degrees, Meds crushed in puree, No straws, and Strict aspiration precautions   General Precautions: Standard, aspiration  Communication strategies:  none    Subjective     No family present  Patient goals: tricia     Pain/Comfort:  Pain Rating 1: 0/10  Pain Rating Post-Intervention 1: 0/10    Respiratory Status: Room air    Objective:     Has the patient been evaluated by SLP for swallowing?   Yes  Keep patient NPO? No   Current Respiratory Status:        Pt. Asleep upon entry to room but did rouse easily.  HOB was raised to 90 degree angle.  Speech intelligibiltiy was improved today with mild dysarthria noted.  Pt. Was given teaspoons of water x5 trials and then was allowed to self feed from a cup with and without a straw x4 ounces of water.  Oral phase of swallow was wfl with no anterior loss of bolus and no delay in initiation of pharyngeal swallow.        Assessment:     Franny Arnold is a 89 y.o. female with an SLP diagnosis of Dysphagia and Cognitive-Linguistic Impairment.    Goals:   Multidisciplinary Problems       SLP Goals          Problem: SLP    Goal Priority Disciplines Outcome   SLP Goal     SLP Ongoing, Progressing   Description: Goals due 1/10/2022  1.  Tolerate mech soft diet with nectar thick liquids with no s/s of aspiration/met  2.  Pt. Will participate in speech language eval/met  3.  Tolerate mech soft diet with thin liquids with no s/s of aspiration                       Plan:     Patient to be seen:  3 x/week   Plan of Care expires:  01/27/23  Plan of Care reviewed with:  patient   SLP Follow-Up:  Yes        Discharge recommendations:  nursing facility, basic   Barriers to Discharge:  None    Time Tracking:     SLP Treatment Date:   01/04/23  Speech Start Time:  0720  Speech Stop Time:  0730     Speech Total Time (min):  10 min    Billable Minutes: Treatment Swallowing Dysfunction 10    01/04/2023

## 2023-01-04 NOTE — NURSING
Patient restless and picking at lines and tele. Re-applied tele multiple times but patient removes electrodes as soon as nursing staff exits room. Attempted to re-direct patient but unsuccessful. Redness and irritation noted on electrode sites. Notified Silvia NP with stroke team about electrode site irritation and patient removing tele. Provider stated that it is fine to leave off for now. No further orders received at this time. Notified tele of patient refusal to leave tele on. No acute signs of distress noted at this time.

## 2023-01-04 NOTE — PLAN OF CARE
NURSING HOME ORDERS    01/04/2023  [facility]  [facility] - NEUROSURGERY (HOSPITAL)  [address]  [facility] [address]  Dept: [phone]  Loc: [phone]     Admit to Skilled Nursing Facility    Diagnoses:  Active Hospital Problems    Diagnosis  POA    *Thrombotic stroke involving right middle cerebral artery [I63.311]  Yes     Priority: 1 - High    Moderate malnutrition [E44.0]  Yes    Weakness [R53.1]  Yes    Aneurysm of ascending aorta without rupture [I71.21]  Yes    Abnormal CT scan, chest [R93.89]  Yes    Mixed hyperlipidemia [E78.2]  Yes    Other hydronephrosis [N13.39]  Yes      Resolved Hospital Problems   No resolved problems to display.       Patient is homebound due to:  Thrombotic stroke involving right middle cerebral artery    Allergies:Review of patient's allergies indicates:  No Known Allergies    Vitals:  Every shift    Diet: soft diet and fluid consistency thin    Activities:   Activity as tolerated    Goals of Care Treatment Preferences:  Code Status: Full Code    Living Will: Yes              Labs:  per facility protocol    Nursing Precautions:  Aspiration , Fall, and Pressure ulcer prevention    Consults:   PT to evaluate and treat- 3 times a week, OT to evaluate and treat- 3 times a week, ST to evaluate and treat- 3 times a week, and Wound Care     Miscellaneous Care: Routine Skin for Bedridden Patients:  Apply moisture barrier cream to all  Wound Care: yes:  see below  --Clean right and left beast folds with vashe to assist in infection prevention.  --Apply antifungal powder possibly yeast                    Medications: Discontinue all previous medication orders, if any. See new list below.     Medication List        START taking these medications      atorvastatin 40 MG tablet  Commonly known as: LIPITOR  Take 1 tablet (40 mg total) by mouth once daily.     clopidogreL 75 mg tablet  Commonly known as: PLAVIX  Take 1 tablet (75 mg total) by mouth once  daily.     melatonin 3 mg tablet  Commonly known as: MELATIN  Take 2 tablets (6 mg total) by mouth nightly.     miconazole NITRATE 2 % 2 % top powder  Commonly known as: MICOTIN  Apply topically 2 (two) times daily.            CONTINUE taking these medications      acetaminophen 325 MG tablet  Commonly known as: TYLENOL  Take 2 tablets (650 mg total) by mouth every 6 (six) hours as needed for Pain.     cholecalciferol (vitamin D3) 50 mcg (2,000 unit) Tab  Commonly known as: VITAMIN D3  Take 1 tablet (2,000 Units total) by mouth once daily.     methocarbamoL 500 MG Tab  Commonly known as: ROBAXIN  Take 500 mg by mouth 4 (four) times daily.     polyethylene glycol 17 gram Pwpk  Commonly known as: GLYCOLAX  Take 17 g by mouth 2 (two) times daily as needed (if no BM x 2 days).                Immunizations Administered as of 1/4/2023       No immunizations on file.            Some patients may experience side effects after vaccination.  These may include fever, headache, muscle or joint aches.  Most symptoms resolve with 24-48 hours and do not require urgent medical evaluation unless they persist for more than 72 hours or symptoms are concerning for an unrelated medical condition.          _________________________________  FRIDA Guidry  01/04/2023

## 2023-01-04 NOTE — PROGRESS NOTES
[facility] Colon - Neurosurgery ([facility])  Vascular Neurology  Comprehensive Stroke Center  Progress Note    Assessment/Plan:     * Thrombotic stroke involving right middle cerebral artery  89 year old female with past medical history of dementia, HTN, and DM who presented to ED with c/o slurred speech and weakness. CTA stroke multiphase negative for acute process, shows multifocal moderate-severe ICAD. MRI brain with acute infarctions in the R MCA territory. Echo unremarkable. Suspect etiology at this time likely ICAD.    NAEO, neuro exam unchanged. Patient more alert and responsive this morning. SLP advanced liquids to thin today. Medically ready for discharge, anticipate dispo to SNF today      Antithrombotics for secondary stroke prevention: Antiplatelets: Clopidogrel: 75 mg daily    Statins for secondary stroke prevention and hyperlipidemia, if present:   Statins: Atorvastatin- 40 mg daily    Aggressive risk factor modification: HTN, HLD, AAD, ? neoplasm     Rehab efforts: The patient has been evaluated by a stroke team provider and the therapy needs have been fully considered based off the presenting complaints and exam findings. The following therapy evaluations are needed: PT evaluate and treat, OT evaluate and treat, SLP evaluate and treat-Recommending SNF, mechanical soft with thin liquids diet    Diagnostics ordered/pending: None     VTE prophylaxis: Heparin 5000 units SQ every 8 hours  Mechanical prophylaxis: Place SCDs    BP parameters: SBP <180      Moderate malnutrition  -Seen by Dietician.  Boost w/meals.  -Strict calorie count and monitor I/Os    Other hydronephrosis  -Noted on CTA chest obtained on 1/2/2023.  -Renal US with hydronephrosis (L >>> R) and non-obstructing nephrolithiasis of R kidney  -Urology consulted, appreciate the consult and recs  -CT Renal stone study obtained and revealed UPJO that Urology feels is likely chronic with no need for intervention at this time.    Mixed  "hyperlipidemia  Stroke risk factor  , goal <70  Continue atorvastatin 40mg daily    Abnormal CT scan, chest  -CTA Chest w/multiple bilatera nodular foci in the upper and lower lobes, the largest in the LLL measuring 2.1 cm.    -Follow up outpatient.  -Mild patchy bilateral ground-glass infiltrates, pneumonitis vs mild edema.  Single view CXR negative for acute findings.  Monitor.    Aneurysm of ascending aorta without rupture  -4.5 cm fusiform aneurysmal dilation of the ascending thoracic aorta noted on CTA Chest (PE study) obtained 1/4/2023.  -Follow up outpatient    Weakness  Due to stroke  Aggressive PT/OT/SLP  Medically ready for dispo to SNF         12/30/2022 NAEO, neuro exam stable. Patient noted to be more lethargic this morning, repeat CTH ordered. Echo and therapy evals pending to complete stroke workup. Diet advanced to mechanical soft with nectar thick liquids.  12/31/2022 NAEON.  Neuro exam remains stable.  She remains lethargic.  Repeat CTH ordered yesterday was negative for any acute findings.  TTE w/mild aortic stenosis.  EF 65%.  Therapy rec SNF placement.  1/1/2023 Patient was restless and mildly agitated around 2030.  Was given melatonin and had improvement of symptoms.  This morning the patient is easily arousable.  She repeatedly states "I'm fine, nothing is wrong." "I want to go home.  My family will take care of me, they take good care of me."  01/02/2023 NAEO, delirium precautions in place and scheduled melatonin. Neuro exam stable, more alert and interactive this morning. States shes feeling better and more like herself. Medically ready for discharge, pending SNF placement.  01/03/2023 NAEO, neuro exam stable. Resting this morning after working with therapy, denies any complaints/concerns. Maintenance IVF for mild dehydration on labs this morning. Discharge to SNF pending.  01/04/2023 NAEO, neuro exam unchanged. Patient more alert and responsive this morning. SLP advanced liquids to " thin today. Medically ready for discharge, anticipate dispo to SNF today        STROKE DOCUMENTATION   Acute Stroke Times   Last Known Normal Date: 12/29/22  Last Known Normal Time: 2130  Symptom Onset Date: 12/29/22  Unknown Symptom Onset Date: Unknown Date  Symptom Onset Time: 1000  Unknown Symptom Onset Time: Unknown Time  Stroke Team Called Date: 12/29/22  Stroke Team Called Time: 1640  Stroke Team Arrival Date: 12/29/22  Stroke Team Arrival Time: 1645  CT Interpretation Time: 1646  Thrombolytic Therapy Recommended: No  CTA Interpretation Time: 1650  Thrombectomy Recommended: No    NIH Scale:  1a. Level of Consciousness: 0-->Alert, keenly responsive  1b. LOC Questions: 1-->Answers one question correctly  1c. LOC Commands: 0-->Performs both tasks correctly  2. Best Gaze: 0-->Normal  3. Visual: 0-->No visual loss  4. Facial Palsy: 0-->Normal symmetrical movements  5a. Motor Arm, Left: 0-->No drift, limb holds 90 (or 45) degrees for full 10 secs  5b. Motor Arm, Right: 0-->No drift, limb holds 90 (or 45) degrees for full 10 secs  6a. Motor Leg, Left: 1-->Drift, leg falls by the end of the 5-sec period but does not hit bed  6b. Motor Leg, Right: 1-->Drift, leg falls by the end of the 5-sec period but does not hit bed  7. Limb Ataxia: 0-->Absent  8. Sensory: 0-->Normal, no sensory loss  9. Best Language: 0-->No aphasia, normal  10. Dysarthria: 0-->Normal  11. Extinction and Inattention (formerly Neglect): 0-->No abnormality  Total (NIH Stroke Scale): 3       Modified Madeline Score: 4  Glidden Coma Scale:    ABCD2 Score:    VCQM1AH2-VUR Score:   HAS -BLED Score:   ICH Score:   Hunt & Katz Classification:      Hemorrhagic change of an Ischemic Stroke: Does this patient have an ischemic stroke with hemorrhagic changes? No     Neurologic Chief Complaint: R MCA stroke    Subjective:     Interval History: Patient is seen for follow-up neurological assessment and treatment recommendations:   NAEO, neuro exam unchanged.  Patient more alert and responsive this morning. SLP advanced liquids to thin today. Medically ready for discharge, anticipate dispo to SNF today    HPI, Past Medical, Family, and Social History remains the same as documented in the initial encounter.     Review of Systems   Unable to perform ROS: Dementia   Neurological:  Positive for speech difficulty and weakness.   Psychiatric/Behavioral:  Positive for confusion.    Scheduled Meds:   atorvastatin  40 mg Oral Daily    clopidogreL  75 mg Oral Daily    heparin (porcine)  5,000 Units Subcutaneous Q8H    melatonin  6 mg Oral Nightly    miconazole NITRATE 2 %   Topical (Top) BID     Continuous Infusions:   sodium chloride 0.9% 75 mL/hr at 01/04/23 0045    sodium chloride 0.9%       PRN Meds:acetaminophen, influenza 65up-adj, labetaloL, sodium chloride 0.9%, sodium chloride 0.9%    Objective:     Vital Signs (Most Recent):  Temp: 97.5 °F (36.4 °C) (01/04/23 0427)  Pulse: 68 (01/04/23 0758)  Resp: 18 (01/04/23 0758)  BP: 136/71 (01/04/23 0758)  SpO2: (!) 94 % (01/04/23 0758)  BP Location: Right arm    Vital Signs Range (Last 24H):  Temp:  [97.5 °F (36.4 °C)-97.9 °F (36.6 °C)]   Pulse:  [57-80]   Resp:  [16-19]   BP: (117-136)/(58-74)   SpO2:  [94 %-95 %]   BP Location: Right arm      Physical Exam  Vitals and nursing note reviewed.   Constitutional:       General: She is not in acute distress.     Appearance: She is well-developed.   HENT:      Head: Normocephalic.      Nose: Nose normal.      Mouth/Throat:      Mouth: Mucous membranes are moist.   Eyes:      General:         Right eye: No discharge.         Left eye: No discharge.      Extraocular Movements: Extraocular movements intact.      Conjunctiva/sclera: Conjunctivae normal.   Cardiovascular:      Rate and Rhythm: Normal rate and regular rhythm.   Pulmonary:      Effort: Pulmonary effort is normal. No respiratory distress.   Abdominal:      General: There is no distension.      Palpations: Abdomen is  soft.      Tenderness: There is no abdominal tenderness.   Musculoskeletal:      Cervical back: Normal range of motion and neck supple.      Right lower leg: No edema.      Left lower leg: No edema.   Skin:     General: Skin is warm and dry.      Capillary Refill: Capillary refill takes less than 2 seconds.   Neurological:      Mental Status: She is alert. She is disoriented.      Motor: Weakness present.       Neurological Exam:   LOC: alert  Attention Span: Good   Language: No aphasia  Articulation: No dysarthria  Orientation: Not oriented to place, Not oriented to time  Facial Movement (CN VII): Symmetric facial expression    Motor: Arm left  Normal 5/5  Leg left  Paresis: 4/5  Arm right  Normal 5/5  Leg right Paresis: 4/5  Sensation: Intact to light touch    Laboratory:  CMP:   Recent Labs   Lab 01/04/23  0534   CALCIUM 9.0   ALBUMIN 3.2*   PROT 5.9*      K 4.3   CO2 22*   *   BUN 20   CREATININE 1.1   ALKPHOS 65   ALT 19   AST 21   BILITOT 0.8     CBC:   Recent Labs   Lab 01/04/23  0534   WBC 8.41   RBC 5.65*   HGB 16.0   HCT 51.3*      MCV 91   MCH 28.3   MCHC 31.2*       Coagulation:   Recent Labs   Lab 12/30/22  0341   INR 1.0   APTT 31.7         Diagnostic Results     Brain Imaging   CT 12/30/2022 Impression: CT head appears unchanged from prior.  Areas of acute infarction noted on MRI are not appreciated on routine CT, due to differences in technique.  No evidence of hemorrhagic conversion.  Chronic microvascular ischemic change.  Moderate generalized cerebral volume loss.     MRI brain without contrast 12/29/22  Study is limited by motion artifact.   Scattered small acute infarctions throughout the right cerebral hemisphere, largest within the posterior right parietal lobe.        Vessel Imaging   CTA stroke multiphase 12/29/22  1. CT head shows no acute intracranial abnormalities.   2. No large vessel occlusion.  Short segment of high-grade stenosis of the distal M1 segment right MCA.   There is flow within the M2 segments of the right MCA.  Moderate to high-grade stenosis in the proximal M2 segment superior division left MCA.  Multifocal irregularity of the bilateral anterior, middle, and posterior cerebral arteries as well as the intracranial vertebral arteries, likely atherosclerotic disease.   3. Calcific atherosclerosis at the bilateral carotid bifurcations with approximately 50% stenosis on the right.  Calcified plaque in the intracranial internal carotid arteries with suspected area of moderate stenosis on the right.   4. Thrombus within the left pulmonary artery.  Eccentric location suggestive of chronic thrombus, although no priors available for comparison.  Consider dedicated CTA PE protocol when clinically stable.   5. Chronic microvascular ischemic change.   6. Several irregular likely endobronchial opacities in the partially visualized right lung as detailed above, likely mucous plugging.   7. Additional findings as above.        Cardiac Imaging   TTE 12/30/2022   The left ventricle is normal in size with concentric remodeling and normal systolic function.   Normal left ventricular diastolic function.   Normal right ventricular size with normal right ventricular systolic function.   There is mild aortic valve stenosis. AV not seen well in SAX. by hemodynamic assessment this appears to be mild AS.   Aortic valve area is 1.80 cm2; peak velocity is 2.24 m/s; mean gradient is 12 mmHg.   Normal central venous pressure (3 mmHg).   The estimated ejection fraction is 65%.      [facility]  Comprehensive Stroke Center  Department of Vascular Neurology   [facility] - Neuro[facility])

## 2023-01-04 NOTE — PLAN OF CARE
01/04/23 1136   Post-Acute Status   Post-Acute Authorization Placement   Post-Acute Placement Status Set-up Complete/Auth obtained   Discharge Plan   Discharge Plan A Skilled Nursing Facility     SW received phone call from Kamini at [facility].  They have accepted the patient and are calling the niece, Chela, to complete the ppw.  SW awaiting orders from provider and will set up transport once notified by Kamini.      2:43 PM  SW received confirmation from Kamini that Chela had completed all the ppw and that patient's room was assigned.  She requested SW fax over transfer orders, cxr, covid test, 142 and Pasrr.  ANNA sent these and waiting on response to get number for RN to call report and to set up transport.     Francoise Murphy, [place]  [facility]  [phone]

## 2023-01-04 NOTE — PLAN OF CARE
Mech soft diet with thin liquids recommended.  Crush meds  Problem: SLP  Goal: SLP Goal  Description: Goals due 1/10/2022  1.  Tolerate mech soft diet with nectar thick liquids with no s/s of aspiration/met  2.  Pt. Will participate in speech language eval/met  3.  Tolerate mech soft diet with thin liquids with no s/s of aspiration  Outcome: Ongoing, Progressing

## 2023-01-05 NOTE — NURSING
Patient transferred per [facility] ambulance service via two ambulance personnel, patient remains alert to self and pleasantly confused, no signs of distress or discomfort noted on transfer. Patient noted have had one large BM prior to ambulance arrival.

## 2023-01-12 ENCOUNTER — TELEPHONE (OUTPATIENT)
Dept: NEUROLOGY | Facility: HOSPITAL | Age: 88
End: 2023-01-12
Payer: MEDICARE

## 2023-02-22 NOTE — PROGRESS NOTES
"[facility]  Orthopedics  Progress Note    Patient Name: Franny Arnold  MRN: [id number]  Admission Date: 1/1/2020  Hospital Length of Stay: 4 days  Attending Provider: Kamini Collado MD  Primary Care Provider: Dinesh Wylie MD  Follow-up For: Procedure(s) (LRB):  INSERTION, INTRAMEDULLARY ZAYNAB (Left)    Post-Operative Day: 3 Days Post-Op  Subjective:     Principal Problem:Closed displaced intertrochanteric fracture of left femur    Principal Orthopedic Problem: Same    Interval History: Patient seen and examined at bedside.  No acute events overnight,patient resting this am. Still with some confusion. Hgb stable at 9.5. Transfers with PT yesterday.   Review of patient's allergies indicates:  No Known Allergies    Current Facility-Administered Medications   Medication    acetaminophen tablet 650 mg    bisacodyl suppository 10 mg    enoxaparin injection 40 mg    melatonin tablet 6 mg    methocarbamol tablet 500 mg    mupirocin 2 % ointment 1 g    ondansetron injection 4 mg    polyethylene glycol packet 17 g    potassium, sodium phosphates 280-160-250 mg packet 1 packet    promethazine (PHENERGAN) 6.25 mg in dextrose 5 % 50 mL IVPB    ropivacaine (PF) 2 mg/ml (0.2%) infusion    sodium chloride 0.9% flush 10 mL    sodium chloride 0.9% flush 10 mL    sodium chloride 0.9% flush 10 mL    vitamin D 1000 units tablet 2,000 Units     Objective:     Vital Signs (Most Recent):  Temp: 99.5 °F (37.5 °C) (01/04/20 2019)  Pulse: 91 (01/05/20 0313)  Resp: 18 (01/04/20 2019)  BP: 132/83 (01/04/20 2019)  SpO2: (!) 94 % (01/04/20 2019) Vital Signs (24h Range):  Temp:  [96.4 °F (35.8 °C)-99.5 °F (37.5 °C)] 99.5 °F (37.5 °C)  Pulse:  [77-92] 91  Resp:  [18] 18  SpO2:  [92 %-95 %] 94 %  BP: ()/(59-83) 132/83     Weight: 54.4 kg (120 lb)  Height: 5' 4" (162.6 cm)  Body mass index is 20.6 kg/m².      Intake/Output Summary (Last 24 hours) at 1/5/2020 0641  Last data filed at 1/4/2020 1800  Gross per 24 " Cardiac Cath Brief Op Note    Preoperative Diagnosis: Right lower extremity claudication    Postoperative Diagnosis: Successful revascularization of in-stent restenosis    Procedure:  1. Peripheral vascular angiogram  2. Atherectomy of SFA, popliteal, tibioperoneal trunk with PANTHERIS 7F  3. DCB angioplasty of SFA, popliteal, tibioperoneal trunk  4. Left femoral access, peroneal hemostasis    Complications: None      Key Findings:   1. Access for revascularization.  Continue anticoagulation regimen.    Final report is in cardiology and imaging section.  Cade Norton MD   Cardiology & Interventional cardiology   Peripheral Arterial & Venous Disease   Director, [facility] Cardiac Cath Lab  [facility] [place]  [facility]  2/22/2023        hour   Intake 720 ml   Output --   Net 720 ml       Ortho/SPM Exam      Physical Exam:  NAD, A/O x 3.  Wound c/d/i with clean dressing.  No focal motor or sensory deficits noted.  LUE:   In sling and swath with ecchymosis  NVI      Significant Labs:   CBC:   Recent Labs   Lab 01/04/20  0355 01/05/20  0446   WBC 11.48 10.23   HGB 8.5* 9.3*   HCT 28.8* 29.9*    236     All pertinent labs within the past 24 hours have been reviewed.    Significant Imaging: I have reviewed and interpreted all pertinent imaging results/findings.    Assessment/Plan:     * Closed displaced intertrochanteric fracture of left femur  Franny Arnold is a 89 y.o. female s/p L femur CMN on 1/2/20 with L prox humerus fx.      - Weight bearing status: WBAT LLE and NWB LUE  - Pain control: multimodal  - Antibiotics: none  - Hgb slight dip to 9.3 this am  - DVT Prophylaxis: lovenox , SCD's at all times when not ambulating.  - PT/OT  - Dispo: continue medical care with early mobilization, transfers with PT. Will continue to monitor mental confusion.             Fracture of proximal end of left humerus  Sling and swath, NWB KATELYN Rolon MD  Orthopedics  [facility]

## 2023-04-03 PROBLEM — I63.311 THROMBOTIC STROKE INVOLVING RIGHT MIDDLE CEREBRAL ARTERY: Status: RESOLVED | Noted: 2022-12-29 | Resolved: 2023-04-03

## 2023-05-03 NOTE — PROGRESS NOTES
CC:  Postop left hip fracture ORIF with IM nail  Non op treatment of left proximal humerus fracture    HPI:  89-year-old female, dementia, fall from standing  Left intertrochanteric hip fracture  Left proximal humerus fracture, 2 part surgical neck minimally displaced    IM nail left hip 01/02/2020  Non op treatment of left proximal humerus fracture      SUBJECTIVE:  Doing well at rehab facility.  Presents today with her daughter and her rehab care provider  Denies fevers, chills, wound drainage  Pain controlled with Percocet  Has been mobilizing with some assistance.  Patient prefers not to wear her shoulder sling.  States that pain is minimal in her shoulder.  3/10 dull and achy  Pain 3/10 dull and achy throughout her hip. Increased pain in left hip with weight-bearing and hip motion, expected after ORIF of hip fracture    OBJECTIVE:  PE  LLE:  Incision healed, no signs of infection  No gross deformities, wounds  Appropriate tenderness to palpation at surgical site  Motor intact hip flex, quad, Tib Ant, gastroc, EHL, FHL.  Sensation intact saphenous, sural, deep/superficial peroneal, tibial nerves  Palp DP/PT pulse, BCR    LUE:  No gross deformities, wounds  No crepitus, No TTP  Passive range of motion shoulder 80° forward flexion  Range of motion elbow 0-130 degrees of flexion.  Motor intact deltoid, bicep, tricep, wrist flexion/extension, finger flexion/extension, interossei  Sensation intact axillary, radial, median, ulnar nerves  Palp rad pulse, BCR      XRAYS:  X-ray left femur demonstrate stable configuration of intertrochanteric hip fracture with intramedullary nail in place.  X-ray left shoulder demonstrate minimally displaced proximal humerus fracture, surgical neck, 2 part, with satisfactory alignment some impaction at fracture site compared to prior imaging, overall alignment stable    ASSESSMENT:  89-year-old female, dementia, fall from standing  Left intertrochanteric hip fracture  Left proximal  humerus fracture, 2 part surgical neck minimally displaced    IM nail left hip 01/02/2020  Non op treatment of left proximal humerus fracture    Doing well  Incision healed  Tolerating nonop management of prox humerus fx, stable XR    PLAN:  Weightbearing as tolerated, range of motion as tolerated left lower extremity    Weight-bearing as tolerated left upper extremity with range of motion as tolerated left upper extremity shoulder, elbow, hand, wrist  Encourage pendulums for shoulder with it is safe and patient is able to perform them per her therapist's recommendation and guidelines  May wear sling for comfort, may remove for ROMAT    Lovenox x4 weeks postop for DVT prophylactics       X-ray AP pelvis, left femur,   X-ray left shoulder   at subsequent follow-up visits     Follow-up postop 6 weeks, 3 months, 6 months, 1 year     Benzoyl Peroxide Counseling: Patient counseled that medicine may cause skin irritation and bleach clothing.  In the event of skin irritation, the patient was advised to reduce the amount of the drug applied or use it less frequently.   The patient verbalized understanding of the proper use and possible adverse effects of benzoyl peroxide.  All of the patient's questions and concerns were addressed.

## (undated) DEVICE — SUT MONOCRYL 3-0 PS-2 UND

## (undated) DEVICE — WIRE GUIDE 3.2MM 400MM
Type: IMPLANTABLE DEVICE | Site: FEMUR | Status: NON-FUNCTIONAL
Removed: 2020-01-02

## (undated) DEVICE — Device

## (undated) DEVICE — SEE MEDLINE ITEM 157150

## (undated) DEVICE — ADHESIVE DERMABOND ADVANCED

## (undated) DEVICE — TAPE SURG DURAPORE 2 X10YD

## (undated) DEVICE — DRAPE C-ARMOR EQUIPMENT COVER

## (undated) DEVICE — DRESSING AQUACEL AG 3.5X10IN

## (undated) DEVICE — DRAPE C ARM 42 X 120 10/BX

## (undated) DEVICE — TRAY MINOR ORTHO

## (undated) DEVICE — BIT DRILL QC 3FLUTED 4.2X145 S

## (undated) DEVICE — SEE MEDLINE ITEM 152529

## (undated) DEVICE — SUT 0 VICRYL / CT-1

## (undated) DEVICE — SUT VICRYL PLUS 2-0 CT1 18

## (undated) DEVICE — DRESSING TRANS 4X4 3/4

## (undated) DEVICE — DRESSING AQUACEL AG ADV 3.5X6

## (undated) DEVICE — GAUZE SPONGE 4X4 12PLY